# Patient Record
Sex: FEMALE | Race: WHITE | Employment: OTHER | URBAN - METROPOLITAN AREA
[De-identification: names, ages, dates, MRNs, and addresses within clinical notes are randomized per-mention and may not be internally consistent; named-entity substitution may affect disease eponyms.]

---

## 2017-03-24 ENCOUNTER — PATIENT MESSAGE (OUTPATIENT)
Dept: FAMILY MEDICINE CLINIC | Facility: CLINIC | Age: 62
End: 2017-03-24

## 2017-03-27 NOTE — TELEPHONE ENCOUNTER
From: Jessica Castanon RN  To: Wanda Prieto  Sent: 3/24/2017 3:27 PM CDT  Subject: Healthy Life Communication    3/24/2017       [address]  [address] [place] [address]      Dear Araceli Javed,    IF YOU ARE 48YEARS OF AGE OR OLDER, COLORECTAL CANCER SCR

## 2017-06-19 ENCOUNTER — OFFICE VISIT (OUTPATIENT)
Dept: FAMILY MEDICINE CLINIC | Facility: CLINIC | Age: 62
End: 2017-06-19

## 2017-06-19 ENCOUNTER — APPOINTMENT (OUTPATIENT)
Dept: LAB | Age: 62
End: 2017-06-19
Attending: FAMILY MEDICINE
Payer: COMMERCIAL

## 2017-06-19 VITALS
RESPIRATION RATE: 16 BRPM | OXYGEN SATURATION: 99 % | WEIGHT: 124 LBS | TEMPERATURE: 99 F | SYSTOLIC BLOOD PRESSURE: 112 MMHG | DIASTOLIC BLOOD PRESSURE: 80 MMHG | HEART RATE: 60 BPM | BODY MASS INDEX: 20.66 KG/M2 | HEIGHT: 65 IN

## 2017-06-19 DIAGNOSIS — Z13.89 SCREENING FOR GENITOURINARY CONDITION: ICD-10-CM

## 2017-06-19 DIAGNOSIS — Z11.59 NEED FOR HEPATITIS C SCREENING TEST: ICD-10-CM

## 2017-06-19 DIAGNOSIS — Z12.11 SCREENING FOR COLON CANCER: ICD-10-CM

## 2017-06-19 DIAGNOSIS — N90.4 LICHEN SCLEROSUS OF FEMALE GENITALIA: ICD-10-CM

## 2017-06-19 DIAGNOSIS — Z13.0 SCREENING FOR DEFICIENCY ANEMIA: ICD-10-CM

## 2017-06-19 DIAGNOSIS — Z13.29 SCREENING FOR THYROID DISORDER: ICD-10-CM

## 2017-06-19 DIAGNOSIS — Z13.1 SCREENING FOR DIABETES MELLITUS: ICD-10-CM

## 2017-06-19 DIAGNOSIS — Z00.00 WELL ADULT EXAM: ICD-10-CM

## 2017-06-19 DIAGNOSIS — Z12.31 ENCOUNTER FOR SCREENING MAMMOGRAM FOR BREAST CANCER: ICD-10-CM

## 2017-06-19 DIAGNOSIS — Z13.31 SCREENING FOR DEPRESSION: ICD-10-CM

## 2017-06-19 DIAGNOSIS — Z13.21 ENCOUNTER FOR VITAMIN DEFICIENCY SCREENING: ICD-10-CM

## 2017-06-19 DIAGNOSIS — Z23 NEED FOR VACCINATION: ICD-10-CM

## 2017-06-19 DIAGNOSIS — Z01.411 ENCOUNTER FOR GYNECOLOGICAL EXAMINATION WITH ABNORMAL FINDING: ICD-10-CM

## 2017-06-19 DIAGNOSIS — E78.00 PURE HYPERCHOLESTEROLEMIA: ICD-10-CM

## 2017-06-19 DIAGNOSIS — Z00.00 WELL ADULT EXAM: Primary | ICD-10-CM

## 2017-06-19 PROBLEM — M85.88 OSTEOPENIA OF SPINE: Status: ACTIVE | Noted: 2017-06-19

## 2017-06-19 PROCEDURE — 90471 IMMUNIZATION ADMIN: CPT | Performed by: FAMILY MEDICINE

## 2017-06-19 PROCEDURE — 81003 URINALYSIS AUTO W/O SCOPE: CPT | Performed by: FAMILY MEDICINE

## 2017-06-19 PROCEDURE — 82306 VITAMIN D 25 HYDROXY: CPT | Performed by: FAMILY MEDICINE

## 2017-06-19 PROCEDURE — 87510 GARDNER VAG DNA DIR PROBE: CPT | Performed by: FAMILY MEDICINE

## 2017-06-19 PROCEDURE — 86803 HEPATITIS C AB TEST: CPT | Performed by: FAMILY MEDICINE

## 2017-06-19 PROCEDURE — 36415 COLL VENOUS BLD VENIPUNCTURE: CPT | Performed by: FAMILY MEDICINE

## 2017-06-19 PROCEDURE — 80061 LIPID PANEL: CPT | Performed by: FAMILY MEDICINE

## 2017-06-19 PROCEDURE — 87660 TRICHOMONAS VAGIN DIR PROBE: CPT | Performed by: FAMILY MEDICINE

## 2017-06-19 PROCEDURE — 90736 HZV VACCINE LIVE SUBQ: CPT | Performed by: FAMILY MEDICINE

## 2017-06-19 PROCEDURE — 99396 PREV VISIT EST AGE 40-64: CPT | Performed by: FAMILY MEDICINE

## 2017-06-19 PROCEDURE — 80050 GENERAL HEALTH PANEL: CPT | Performed by: FAMILY MEDICINE

## 2017-06-19 PROCEDURE — 87480 CANDIDA DNA DIR PROBE: CPT | Performed by: FAMILY MEDICINE

## 2017-06-19 RX ORDER — CLOBETASOL PROPIONATE 0.5 MG/G
1 OINTMENT TOPICAL DAILY
Qty: 30 G | Refills: 0 | Status: SHIPPED | OUTPATIENT
Start: 2017-06-19 | End: 2017-07-17

## 2017-06-19 NOTE — PATIENT INSTRUCTIONS
Prevention Guidelines, Women Ages 48 to 59  Screening tests and vaccines are an important part of managing your health. Health counseling is essential, too. Below are guidelines for these, for women ages 48 to 59.  Talk with your healthcare provider to ma Lung cancer Adults age 54 to [de-identified] who have smoked Yearly screening in smokers with 30 pack-year history of smoking or who quit within 15 years   Obesity All women in this age group At routine exams   Osteoporosis Women who are postmenopausal Ask your healthc PPSV23: 1 to 2 doses through age 59, or 1 dose at 72 or older (protects against 23 types of pneumococcal bacteria)   Tetanus/diphtheria/pertussis (Td/Tdap) booster All women in this age group Td every 10 years, or a one-time dose of Tdap instead of a Td dana

## 2017-06-19 NOTE — PROGRESS NOTES
CC: Annual Physical Exam    HPI:   Dru Delgado is a 57year old female who presents for a complete physical exam. Symptoms: has itching, no discharge.     Wt Readings from Last 6 Encounters:  06/19/17 : 124 lb  10/30/16 : 125 lb  10/10/16 : 125 lb  06/16/16 wnl neg hpv 2014   • Broken leg 1999   • History of pneumonia as a child    • H1N1 influenza 7601   • Lichen sclerosus    • Diverticulosis    • Raynaud's disease    • Arthritis    • Frozen shoulder    • Anxiety           Past Surgical History    BIOPSY SYSTEMS:   CONSTITUTIONAL:  Denies unusual weight gain/loss, fever, chills, or fatigue. EENT:  Eyes:  Denies eye pain, visual loss, blurred vision, double vision or yellow sclerae.  Ears, Nose, Throat:  Denies hearing loss, congestion, runny nose or sore t erythema or exudate. Mouth:  No oral lesions or ulcerations, good dentition. NECK: Supple, no CLAD, no JVD, no thyromegaly. SKIN: No rashes, no skin lesion, no bruising, good turgor. HEART:  Regular rate and rhythm, no murmurs, no rubs or gallops.   Mesfin Shiraz MARLYS W/CAD (CPT=/45967); Future  - Occult Blood, Fecal, Immunoassay [E]; Future  - Immunization Admin Counseling, 1st Component, 18 years and older  - Zoster (Shingles) Immunization (48 and older)    2.  Screening for genitourinary condition  UA with Clobetasol Propionate 0.05 % External Ointment; Apply 1 Application topically daily. Dispense: 30 g; Refill: 0    Annual Depression Screen due on 10/23/1955 - done today. Mammogram,1 Yr due on 06/27/2017 - ordered today.     UA is abnormal.  Last eye exam

## 2017-06-26 ENCOUNTER — HOSPITAL ENCOUNTER (OUTPATIENT)
Dept: MAMMOGRAPHY | Age: 62
Discharge: HOME | End: 2017-06-26
Attending: FAMILY MEDICINE
Payer: COMMERCIAL

## 2017-06-26 DIAGNOSIS — Z12.31 ENCOUNTER FOR SCREENING MAMMOGRAM FOR BREAST CANCER: ICD-10-CM

## 2017-06-26 DIAGNOSIS — Z00.00 WELL ADULT EXAM: ICD-10-CM

## 2017-06-26 PROCEDURE — 77067 SCR MAMMO BI INCL CAD: CPT | Performed by: FAMILY MEDICINE

## 2017-06-28 ENCOUNTER — PATIENT MESSAGE (OUTPATIENT)
Dept: FAMILY MEDICINE CLINIC | Facility: CLINIC | Age: 62
End: 2017-06-28

## 2017-07-10 ENCOUNTER — APPOINTMENT (OUTPATIENT)
Dept: LAB | Facility: HOSPITAL | Age: 62
End: 2017-07-10
Attending: FAMILY MEDICINE
Payer: COMMERCIAL

## 2017-07-10 DIAGNOSIS — Z00.00 WELL ADULT EXAM: ICD-10-CM

## 2017-07-10 DIAGNOSIS — Z12.11 SCREENING FOR COLON CANCER: ICD-10-CM

## 2017-07-10 PROCEDURE — 82272 OCCULT BLD FECES 1-3 TESTS: CPT

## 2017-07-17 ENCOUNTER — OFFICE VISIT (OUTPATIENT)
Dept: OBGYN CLINIC | Facility: CLINIC | Age: 62
End: 2017-07-17

## 2017-07-17 VITALS
DIASTOLIC BLOOD PRESSURE: 64 MMHG | SYSTOLIC BLOOD PRESSURE: 122 MMHG | HEIGHT: 64.5 IN | WEIGHT: 126 LBS | BODY MASS INDEX: 21.25 KG/M2 | HEART RATE: 60 BPM

## 2017-07-17 DIAGNOSIS — N95.1 SYMPTOMATIC MENOPAUSAL OR FEMALE CLIMACTERIC STATES: ICD-10-CM

## 2017-07-17 DIAGNOSIS — N81.89 PELVIC FLOOR RELAXATION: ICD-10-CM

## 2017-07-17 DIAGNOSIS — N90.4 LICHEN SCLEROSUS OF FEMALE GENITALIA: ICD-10-CM

## 2017-07-17 DIAGNOSIS — Z01.419 WELL FEMALE EXAM WITH ROUTINE GYNECOLOGICAL EXAM: Primary | ICD-10-CM

## 2017-07-17 PROCEDURE — 99396 PREV VISIT EST AGE 40-64: CPT | Performed by: OBSTETRICS & GYNECOLOGY

## 2017-07-17 NOTE — PATIENT INSTRUCTIONS
Understanding Lichen Sclerosus  Lichen sclerosus is a long-term (chronic) skin condition. It causes white patches to form on the body. These can appear anywhere, even in the mouth. But they most often affect skin around the genitals.  The condition is mor · Surgery. This treatment helps with scarring and skin disfigurement. Men may benefit from circumcision if they haven’t yet had it done.   Possible complications of lichen sclerosus  ·  Skin cancer of the genitals  When to call your healthcare provider  Tuan

## 2017-07-17 NOTE — PROGRESS NOTES
GYN H&P     2017  3:28 PM    CC: Patient is here for annual     HPI: patient is a 57year old  here for her annual gyn exam.   She has complaints of lichen sclerosis and urinary urgency with pelvic floor issues.  She recently restarted clobetaso [facility]*        Comment: Many years ago, thinks was rash    Current Outpatient Prescriptions on File Prior to Visit:  Efinaconazole (JUBLIA) 10 % External Solution Apply 1 Application topically daily.  Disp: 8 mL Rfl: 5   Probiotic Product (PROBIOTIC O Sexual activity: No     Other Topics Concern    Caffeine Concern Yes    Comment: occas green tea, occas chocolate, occas soda     Exercise Yes    Comment: moni, treadmill, weights x 4days per week     Seat Belt Yes     Social History Narrative   None on f lesions noted. Bladder: well supported, urethra post menopausal, no lesions or fissures                     Vagina: normal pink mucosa, no lesions, normal clear discharge.                       Uterus: av, mobile, non tender, normal size mild desc

## 2017-09-26 ENCOUNTER — OFFICE VISIT (OUTPATIENT)
Dept: FAMILY MEDICINE CLINIC | Facility: CLINIC | Age: 62
End: 2017-09-26

## 2017-09-26 VITALS
OXYGEN SATURATION: 99 % | HEART RATE: 70 BPM | HEIGHT: 65 IN | TEMPERATURE: 98 F | WEIGHT: 124 LBS | DIASTOLIC BLOOD PRESSURE: 70 MMHG | SYSTOLIC BLOOD PRESSURE: 124 MMHG | BODY MASS INDEX: 20.66 KG/M2
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
## 2017-09-26 DIAGNOSIS — L08.89 SECONDARY INFECTION OF SKIN: ICD-10-CM

## 2017-09-26 PROCEDURE — 99212 OFFICE O/P EST SF 10 MIN: CPT | Performed by: PHYSICIAN ASSISTANT

## 2017-09-26 RX ORDER — CEPHALEXIN 500 MG/1
500 CAPSULE ORAL 2 TIMES DAILY
Qty: 20 CAPSULE | Refills: 0 | Status: SHIPPED | OUTPATIENT
Start: 2017-09-26 | End: 2017-10-06

## 2017-09-26 RX ORDER — METHYLPREDNISOLONE 4 MG/1
TABLET ORAL
Qty: 1 KIT | Refills: 0 | Status: SHIPPED | OUTPATIENT
Start: 2017-09-26 | End: 2018-07-25 | Stop reason: ALTCHOICE

## 2017-09-26 NOTE — PROGRESS NOTES
CHIEF COMPLAINT:   Patient presents with:  Bite Sting,Insect (integumentary): pt states she  has moquito bites on ankles, has redness and itching x 1 wk     HPI:     Marly Nickerson is a 57year old female who presents with concerns of insect bites on ankles.  P • Raynaud's disease    • Urinary incontinence    • Vestibulitis, vulvar 2010   • Vulvodynia 2010      Social History:  Smoking status: Never Smoker                                                              Smokeless tobacco: Never Used When an insect stings you, it injects venom. When an insect bites you, it does not. Stings and bites may cause a local reaction. Or they may cause a reaction that affects your whole body. Bites and stings may become infected.  Signs of infection include red Follow up with your healthcare provider, or as advised if you don't get better over the next 2 days or if your symptoms get worse.   Call 911  Call 911 if any of these occur:  · Swelling of the face, eyelids, mouth, throat, or tongue  · Difficulty swallowin

## 2017-11-04 ENCOUNTER — IMMUNIZATION (OUTPATIENT)
Dept: FAMILY MEDICINE CLINIC | Facility: CLINIC | Age: 62
End: 2017-11-04

## 2017-11-04 DIAGNOSIS — Z23 NEED FOR VACCINATION: ICD-10-CM

## 2017-11-04 PROCEDURE — 90686 IIV4 VACC NO PRSV 0.5 ML IM: CPT | Performed by: FAMILY MEDICINE

## 2017-11-04 PROCEDURE — 90471 IMMUNIZATION ADMIN: CPT | Performed by: FAMILY MEDICINE

## 2018-01-31 ENCOUNTER — TELEPHONE (OUTPATIENT)
Dept: FAMILY MEDICINE CLINIC | Facility: CLINIC | Age: 63
End: 2018-01-31

## 2018-01-31 NOTE — TELEPHONE ENCOUNTER
Received something from [facility] oncology, using  as PCP, she hasn't seen since 2013, David Bautista saw a couple of times, needs to book with  if she wishes, or  if she wants to stay with David Bautista.

## 2018-01-31 NOTE — TELEPHONE ENCOUNTER
Record shows appt scheduled 1/25/18 for cpx 6/21/18-last cpx was 6/19/17 with dr Jammie Bennett follow up visit in 3 months. No visit since. Due to above info, please call pt to schedule appt sooner-thanks!

## 2018-01-31 NOTE — TELEPHONE ENCOUNTER
All info below noted, but pt has dx hypercholesterolemia, was advised to follow up 9/2017, come in fasting and has not been seen or had repeat labs since. **dr Milton Loose to above info please advise if you want pt seen in ofc sooner than 6/2018-thanks!

## 2018-06-21 ENCOUNTER — OFFICE VISIT (OUTPATIENT)
Dept: FAMILY MEDICINE CLINIC | Facility: CLINIC | Age: 63
End: 2018-06-21

## 2018-06-21 VITALS
BODY MASS INDEX: 20.83 KG/M2 | HEIGHT: 65 IN | SYSTOLIC BLOOD PRESSURE: 100 MMHG | HEART RATE: 64 BPM | RESPIRATION RATE: 14 BRPM | DIASTOLIC BLOOD PRESSURE: 66 MMHG | WEIGHT: 125 LBS

## 2018-06-21 DIAGNOSIS — M85.88 OSTEOPENIA OF SPINE: ICD-10-CM

## 2018-06-21 DIAGNOSIS — Z13.89 SCREENING FOR GENITOURINARY CONDITION: ICD-10-CM

## 2018-06-21 DIAGNOSIS — Z13.29 SCREENING FOR ENDOCRINE, METABOLIC AND IMMUNITY DISORDER: ICD-10-CM

## 2018-06-21 DIAGNOSIS — Z78.0 POST-MENOPAUSAL: ICD-10-CM

## 2018-06-21 DIAGNOSIS — Z79.899 MEDICATION MANAGEMENT: ICD-10-CM

## 2018-06-21 DIAGNOSIS — Z13.228 SCREENING FOR ENDOCRINE, METABOLIC AND IMMUNITY DISORDER: ICD-10-CM

## 2018-06-21 DIAGNOSIS — B35.1 ONYCHOMYCOSIS: ICD-10-CM

## 2018-06-21 DIAGNOSIS — R82.90 ABNORMAL URINALYSIS: ICD-10-CM

## 2018-06-21 DIAGNOSIS — Z13.0 SCREENING FOR ENDOCRINE, METABOLIC AND IMMUNITY DISORDER: ICD-10-CM

## 2018-06-21 DIAGNOSIS — E55.9 VITAMIN D DEFICIENCY: ICD-10-CM

## 2018-06-21 DIAGNOSIS — Z00.00 BLOOD TESTS FOR ROUTINE GENERAL PHYSICAL EXAMINATION: ICD-10-CM

## 2018-06-21 DIAGNOSIS — Z00.00 ROUTINE GENERAL MEDICAL EXAMINATION AT A HEALTH CARE FACILITY: Primary | ICD-10-CM

## 2018-06-21 DIAGNOSIS — E78.00 PURE HYPERCHOLESTEROLEMIA: ICD-10-CM

## 2018-06-21 PROCEDURE — 81001 URINALYSIS AUTO W/SCOPE: CPT | Performed by: FAMILY MEDICINE

## 2018-06-21 PROCEDURE — 99396 PREV VISIT EST AGE 40-64: CPT | Performed by: FAMILY MEDICINE

## 2018-06-21 PROCEDURE — 81003 URINALYSIS AUTO W/O SCOPE: CPT | Performed by: FAMILY MEDICINE

## 2018-06-21 PROCEDURE — 87086 URINE CULTURE/COLONY COUNT: CPT | Performed by: FAMILY MEDICINE

## 2018-06-21 RX ORDER — CLOBETASOL PROPIONATE 0.5 MG/G
OINTMENT TOPICAL
COMMUNITY
Start: 2017-06-19 | End: 2018-07-25

## 2018-06-21 RX ORDER — TERBINAFINE HYDROCHLORIDE 250 MG/1
250 TABLET ORAL DAILY
Qty: 30 TABLET | Refills: 2 | Status: SHIPPED | OUTPATIENT
Start: 2018-06-21 | End: 2018-09-13

## 2018-06-21 RX ORDER — LYSINE HCL 500 MG
TABLET ORAL
COMMUNITY
End: 2018-07-25

## 2018-06-21 NOTE — H&P
CC: Annual Physical Exam    HPI:   Nain Sood is a 60year old female who presents for a complete physical exam. Symptoms: is menopausal. Patient complains of she has been dealing with onychomycosis of the right great toenail for the past 6 years.   She has mupirocin 2 % External Ointment Apply to affected area BID x10 days Disp: 15 g Rfl: 0   methylPREDNISolone (MEDROL) 4 MG Oral Tablet Therapy Pack As directed. Disp: 1 kit Rfl: 0   Probiotic Product (PROBIOTIC OR) Take 1 Tab by mouth daily.  Disp:  Rfl: Grandfather    • Hypertension Maternal Grandfather    • Stroke Maternal Grandfather    • Diabetes Paternal Grandmother    • Other [OTHER] Paternal Grandmother      dementia, glaucoma   • Diabetes Paternal Grandfather      No   • Heart Disorder Paternal Gra toenail  HEENT: atraumatic, normocephalic,ears and throat -- post nasal drip; mildly swollen turbs  EYES:PERRLA, EOMI, conjunctiva are clear  NECK: supple,no adenopathy,no bruits, no thyromegaly  CHEST: no chest tenderness  BREAST: DEFERRED to GYN  LUNGS: minutes and over 50% was spent counseling and coordination of care. Pt' s weight is Body mass index is 20.8 kg/m². , recommended low fat diet and aerobic exercise 30 minutes three times weekly.     The patient indicates understanding of these issues and a

## 2018-06-22 ENCOUNTER — TELEPHONE (OUTPATIENT)
Dept: FAMILY MEDICINE CLINIC | Facility: CLINIC | Age: 63
End: 2018-06-22

## 2018-06-22 ENCOUNTER — LAB ENCOUNTER (OUTPATIENT)
Dept: LAB | Age: 63
End: 2018-06-22
Attending: FAMILY MEDICINE
Payer: COMMERCIAL

## 2018-06-22 DIAGNOSIS — Z00.00 BLOOD TESTS FOR ROUTINE GENERAL PHYSICAL EXAMINATION: ICD-10-CM

## 2018-06-22 DIAGNOSIS — Z13.228 SCREENING FOR ENDOCRINE, METABOLIC AND IMMUNITY DISORDER: ICD-10-CM

## 2018-06-22 DIAGNOSIS — Z13.29 SCREENING FOR ENDOCRINE, METABOLIC AND IMMUNITY DISORDER: ICD-10-CM

## 2018-06-22 DIAGNOSIS — Z13.0 SCREENING FOR ENDOCRINE, METABOLIC AND IMMUNITY DISORDER: ICD-10-CM

## 2018-06-22 PROCEDURE — 36415 COLL VENOUS BLD VENIPUNCTURE: CPT | Performed by: FAMILY MEDICINE

## 2018-06-22 PROCEDURE — 86706 HEP B SURFACE ANTIBODY: CPT | Performed by: FAMILY MEDICINE

## 2018-06-22 PROCEDURE — 86735 MUMPS ANTIBODY: CPT | Performed by: FAMILY MEDICINE

## 2018-06-22 PROCEDURE — 86762 RUBELLA ANTIBODY: CPT | Performed by: FAMILY MEDICINE

## 2018-06-22 PROCEDURE — 86765 RUBEOLA ANTIBODY: CPT | Performed by: FAMILY MEDICINE

## 2018-06-22 PROCEDURE — 86708 HEPATITIS A ANTIBODY: CPT | Performed by: FAMILY MEDICINE

## 2018-06-22 PROCEDURE — 80061 LIPID PANEL: CPT | Performed by: FAMILY MEDICINE

## 2018-06-22 PROCEDURE — 80050 GENERAL HEALTH PANEL: CPT | Performed by: FAMILY MEDICINE

## 2018-06-22 PROCEDURE — 82306 VITAMIN D 25 HYDROXY: CPT | Performed by: FAMILY MEDICINE

## 2018-06-22 PROCEDURE — 86709 HEPATITIS A IGM ANTIBODY: CPT | Performed by: FAMILY MEDICINE

## 2018-06-25 ENCOUNTER — TELEPHONE (OUTPATIENT)
Dept: OBGYN CLINIC | Facility: CLINIC | Age: 63
End: 2018-06-25

## 2018-06-25 DIAGNOSIS — Z12.39 SCREENING FOR MALIGNANT NEOPLASM OF BREAST: Primary | ICD-10-CM

## 2018-06-25 PROBLEM — M54.10 RADICULITIS: Status: ACTIVE | Noted: 2018-06-25

## 2018-06-25 PROBLEM — M43.16 SPONDYLOLISTHESIS OF LUMBAR REGION: Status: ACTIVE | Noted: 2018-06-25

## 2018-06-25 NOTE — TELEPHONE ENCOUNTER
Patient is coming to see you for an Annual; however, she would like to know if you can put an order in for a mammogram before her appt.

## 2018-06-25 NOTE — TELEPHONE ENCOUNTER
Last ov: 7/17/17 with Dr. Rowland Plants for annual  Last mammogram: 6/26/17 benign    appt scheduled for 7/25/18    Screening mammogram order placed, per protocol. Patient notified by phone.

## 2018-06-27 ENCOUNTER — TELEPHONE (OUTPATIENT)
Dept: FAMILY MEDICINE CLINIC | Facility: CLINIC | Age: 63
End: 2018-06-27

## 2018-06-27 DIAGNOSIS — Z23 NEED FOR VACCINATION: Primary | ICD-10-CM

## 2018-06-28 ENCOUNTER — HOSPITAL ENCOUNTER (OUTPATIENT)
Dept: BONE DENSITY | Age: 63
Discharge: HOME | End: 2018-06-28
Attending: FAMILY MEDICINE
Payer: COMMERCIAL

## 2018-06-28 ENCOUNTER — HOSPITAL ENCOUNTER (OUTPATIENT)
Dept: MAMMOGRAPHY | Age: 63
Discharge: HOME | End: 2018-06-28
Attending: OBSTETRICS & GYNECOLOGY
Payer: COMMERCIAL

## 2018-06-28 DIAGNOSIS — Z12.39 SCREENING FOR MALIGNANT NEOPLASM OF BREAST: ICD-10-CM

## 2018-06-28 DIAGNOSIS — Z78.0 POST-MENOPAUSAL: ICD-10-CM

## 2018-06-28 DIAGNOSIS — E55.9 VITAMIN D DEFICIENCY: ICD-10-CM

## 2018-06-28 DIAGNOSIS — M85.88 OSTEOPENIA OF SPINE: ICD-10-CM

## 2018-06-28 PROCEDURE — 77067 SCR MAMMO BI INCL CAD: CPT | Performed by: OBSTETRICS & GYNECOLOGY

## 2018-06-28 PROCEDURE — 77063 BREAST TOMOSYNTHESIS BI: CPT | Performed by: OBSTETRICS & GYNECOLOGY

## 2018-06-28 PROCEDURE — 77080 DXA BONE DENSITY AXIAL: CPT | Performed by: FAMILY MEDICINE

## 2018-07-02 ENCOUNTER — NURSE ONLY (OUTPATIENT)
Dept: FAMILY MEDICINE CLINIC | Facility: CLINIC | Age: 63
End: 2018-07-02

## 2018-07-02 PROCEDURE — 90471 IMMUNIZATION ADMIN: CPT

## 2018-07-02 PROCEDURE — 90746 HEPB VACCINE 3 DOSE ADULT IM: CPT

## 2018-07-25 ENCOUNTER — OFFICE VISIT (OUTPATIENT)
Dept: OBGYN CLINIC | Facility: CLINIC | Age: 63
End: 2018-07-25
Payer: COMMERCIAL

## 2018-07-25 VITALS
SYSTOLIC BLOOD PRESSURE: 110 MMHG | DIASTOLIC BLOOD PRESSURE: 62 MMHG | HEIGHT: 65 IN | BODY MASS INDEX: 20.83 KG/M2 | HEART RATE: 65 BPM | WEIGHT: 125 LBS

## 2018-07-25 DIAGNOSIS — N81.89 PELVIC FLOOR RELAXATION: ICD-10-CM

## 2018-07-25 DIAGNOSIS — N90.4 LICHEN SCLEROSUS OF FEMALE GENITALIA: ICD-10-CM

## 2018-07-25 DIAGNOSIS — Z01.419 WELL FEMALE EXAM WITH ROUTINE GYNECOLOGICAL EXAM: Primary | ICD-10-CM

## 2018-07-25 DIAGNOSIS — Z12.4 CERVICAL CANCER SCREENING: ICD-10-CM

## 2018-07-25 PROCEDURE — 88175 CYTOPATH C/V AUTO FLUID REDO: CPT | Performed by: OBSTETRICS & GYNECOLOGY

## 2018-07-25 PROCEDURE — 87624 HPV HI-RISK TYP POOLED RSLT: CPT | Performed by: OBSTETRICS & GYNECOLOGY

## 2018-07-25 PROCEDURE — 99396 PREV VISIT EST AGE 40-64: CPT | Performed by: OBSTETRICS & GYNECOLOGY

## 2018-07-25 RX ORDER — BIOTIN 1 MG
TABLET ORAL
COMMUNITY
Start: 2018-06-24

## 2018-07-25 NOTE — PROGRESS NOTES
GYN H&P     2018  11:37 AM    CC: Patient is here for annual    HPI: patient is a 60year old  here for her annual gyn exam.   She has no complaints. Denies any pelvic pain or irregular vaginal discharge. LS is symptomatic off and on.  Advised cryosurgeries x 2  1990: OTHER SURGICAL HISTORY      Comment: stereotactic biopsy    Sulfa [facility]*        Comment: Many years ago, thinks was rash    Current Outpatient Prescriptions on File Prior to Visit:  ACIDOPHILUS LACTOBACILLUS OR Take by mouth Used    Alcohol use No    Drug use: No    Sexual activity: No     Other Topics Concern    Caffeine Concern Yes    Comment: occas green tea, occas chocolate, occas soda     Exercise Yes    Comment: moni, treadmill, weights x 4days per week     Seat [place] appear wnl, no abnormal discharge or lesions noted. Bladder:+ relaxation urethra wnl, no lesions or fissures                     Vagina:pale pink mucosa, no lesions, normal clear discharge.                       Uterus: av, mobile, non tender, nor

## 2018-07-26 LAB — HPV I/H RISK 1 DNA SPEC QL NAA+PROBE: NEGATIVE

## 2018-08-10 ENCOUNTER — NURSE ONLY (OUTPATIENT)
Dept: FAMILY MEDICINE CLINIC | Facility: CLINIC | Age: 63
End: 2018-08-10
Payer: COMMERCIAL

## 2018-08-10 ENCOUNTER — APPOINTMENT (OUTPATIENT)
Dept: LAB | Age: 63
End: 2018-08-10
Attending: FAMILY MEDICINE
Payer: COMMERCIAL

## 2018-08-10 DIAGNOSIS — Z23 NEED FOR VACCINATION: Primary | ICD-10-CM

## 2018-08-10 DIAGNOSIS — B35.1 ONYCHOMYCOSIS: ICD-10-CM

## 2018-08-10 LAB
ALBUMIN SERPL-MCNC: 3.7 G/DL (ref 3.5–4.8)
ALP LIVER SERPL-CCNC: 68 U/L (ref 50–130)
ALT SERPL-CCNC: 21 U/L (ref 14–54)
AST SERPL-CCNC: 30 U/L (ref 15–41)
BILIRUB DIRECT SERPL-MCNC: 0.1 MG/DL (ref 0.1–0.5)
BILIRUB SERPL-MCNC: 0.3 MG/DL (ref 0.1–2)
M PROTEIN MFR SERPL ELPH: 7.2 G/DL (ref 6.1–8.3)

## 2018-08-10 PROCEDURE — 36415 COLL VENOUS BLD VENIPUNCTURE: CPT | Performed by: FAMILY MEDICINE

## 2018-08-10 PROCEDURE — 90471 IMMUNIZATION ADMIN: CPT | Performed by: FAMILY MEDICINE

## 2018-08-10 PROCEDURE — 80076 HEPATIC FUNCTION PANEL: CPT | Performed by: FAMILY MEDICINE

## 2018-08-10 PROCEDURE — 90746 HEPB VACCINE 3 DOSE ADULT IM: CPT | Performed by: FAMILY MEDICINE

## 2018-08-27 ENCOUNTER — OFFICE VISIT (OUTPATIENT)
Dept: FAMILY MEDICINE CLINIC | Facility: CLINIC | Age: 63
End: 2018-08-27
Payer: COMMERCIAL

## 2018-08-27 VITALS
OXYGEN SATURATION: 99 % | DIASTOLIC BLOOD PRESSURE: 60 MMHG | HEART RATE: 55 BPM | WEIGHT: 123 LBS | SYSTOLIC BLOOD PRESSURE: 100 MMHG | RESPIRATION RATE: 16 BRPM | BODY MASS INDEX: 20.49 KG/M2 | HEIGHT: 65 IN

## 2018-08-27 DIAGNOSIS — L57.0 AK (ACTINIC KERATOSIS): Primary | ICD-10-CM

## 2018-08-27 DIAGNOSIS — L82.1 SK (SEBORRHEIC KERATOSIS): ICD-10-CM

## 2018-08-27 DIAGNOSIS — B35.1 ONYCHOMYCOSIS: ICD-10-CM

## 2018-08-27 PROCEDURE — 99213 OFFICE O/P EST LOW 20 MIN: CPT | Performed by: FAMILY MEDICINE

## 2018-08-27 NOTE — PROGRESS NOTES
Anna Galloway is a 60year old female. HPI:   PT. Is here for K and SK removal.  She would like me to look into her right ear. Wax? She wants me to look at her right great toenail. Meds reviewed.       Current Outpatient Prescriptions:  Cholecalciferol (VIT feels well otherwise  SKIN:  skin lesions  EAR: wax?   LUNGS: denies shortness of breath with exertion  CARDIOVASCULAR: denies chest pain on exertion  GI: denies abdominal pain,denies heartburn  MUSCULOSKELETAL: denies back pain  EXTREMITIES:  No pain or nu

## 2018-09-03 ENCOUNTER — OFFICE VISIT (OUTPATIENT)
Dept: FAMILY MEDICINE CLINIC | Facility: CLINIC | Age: 63
End: 2018-09-03
Payer: COMMERCIAL

## 2018-09-03 VITALS
SYSTOLIC BLOOD PRESSURE: 114 MMHG | TEMPERATURE: 98 F | HEIGHT: 65 IN | WEIGHT: 127 LBS | HEART RATE: 61 BPM | DIASTOLIC BLOOD PRESSURE: 66 MMHG | BODY MASS INDEX: 21.16 KG/M2 | OXYGEN SATURATION: 98 %

## 2018-09-03 DIAGNOSIS — H61.21 IMPACTED CERUMEN OF RIGHT EAR: Primary | ICD-10-CM

## 2018-09-03 PROCEDURE — 69209 REMOVE IMPACTED EAR WAX UNI: CPT | Performed by: PHYSICIAN ASSISTANT

## 2018-09-03 NOTE — PROGRESS NOTES
CHIEF COMPLAINT:   Patient presents with:  Ear Problem: pt states she has trouble hearing from right ear       HPI:   Carlo Snell is a 60year old female who presents to clinic today with complaints of clogged ears. Patient requesting ear flush.  Patient developed, well nourished,in no apparent distress    Cerumen Removal Procedure  Patient gave verbal consent. Risks and benefits of removal were discussed with the patient. Patient agreed to proceed with procedure.     Location: right ear   Indication: TM

## 2018-09-24 ENCOUNTER — OFFICE VISIT (OUTPATIENT)
Dept: FAMILY MEDICINE CLINIC | Facility: CLINIC | Age: 63
End: 2018-09-24
Payer: COMMERCIAL

## 2018-09-24 ENCOUNTER — APPOINTMENT (OUTPATIENT)
Dept: LAB | Age: 63
End: 2018-09-24
Attending: FAMILY MEDICINE
Payer: COMMERCIAL

## 2018-09-24 VITALS
TEMPERATURE: 98 F | BODY MASS INDEX: 20.24 KG/M2 | HEIGHT: 64.5 IN | RESPIRATION RATE: 14 BRPM | DIASTOLIC BLOOD PRESSURE: 70 MMHG | WEIGHT: 120 LBS | SYSTOLIC BLOOD PRESSURE: 100 MMHG | HEART RATE: 64 BPM

## 2018-09-24 DIAGNOSIS — R19.7 DIARRHEA, UNSPECIFIED TYPE: Primary | ICD-10-CM

## 2018-09-24 DIAGNOSIS — Z23 NEED FOR VACCINATION: ICD-10-CM

## 2018-09-24 DIAGNOSIS — B35.1 ONYCHOMYCOSIS: ICD-10-CM

## 2018-09-24 LAB
ALBUMIN SERPL-MCNC: 3.9 G/DL (ref 3.5–4.8)
ALP LIVER SERPL-CCNC: 63 U/L (ref 50–130)
ALT SERPL-CCNC: 20 U/L (ref 14–54)
AST SERPL-CCNC: 30 U/L (ref 15–41)
BILIRUB DIRECT SERPL-MCNC: 0.1 MG/DL (ref 0.1–0.5)
BILIRUB SERPL-MCNC: 0.4 MG/DL (ref 0.1–2)
M PROTEIN MFR SERPL ELPH: 7.8 G/DL (ref 6.1–8.3)

## 2018-09-24 PROCEDURE — 99213 OFFICE O/P EST LOW 20 MIN: CPT | Performed by: FAMILY MEDICINE

## 2018-09-24 PROCEDURE — 90471 IMMUNIZATION ADMIN: CPT | Performed by: FAMILY MEDICINE

## 2018-09-24 PROCEDURE — 90686 IIV4 VACC NO PRSV 0.5 ML IM: CPT | Performed by: FAMILY MEDICINE

## 2018-09-24 PROCEDURE — 36415 COLL VENOUS BLD VENIPUNCTURE: CPT | Performed by: FAMILY MEDICINE

## 2018-09-24 PROCEDURE — 80076 HEPATIC FUNCTION PANEL: CPT | Performed by: FAMILY MEDICINE

## 2018-09-24 NOTE — PROGRESS NOTES
Marlen Blanca is a 60year old female. HPI:   Left to a trip on September 10 to [facility]. Had 6 BM yesterday. It was watery BM today. She had a good stool on Friday, but had immodium from [address] [place] [address] Street. Has lost a few pounds. Appetite is good.   No fever or c Bilirubin, Direct 0.1 0.1 - 0.5 mg/dL    Total Protein 7.2 6.1 - 8.3 g/dL    Albumin 3.7 3.5 - 4.8 g/dL       REVIEW OF SYSTEMS:   GENERAL: feels well otherwise  SKIN: denies any unusual skin lesions  LUNGS: denies shortness of breath with exertion  CARDIO

## 2018-09-25 ENCOUNTER — LAB ENCOUNTER (OUTPATIENT)
Dept: LAB | Age: 63
End: 2018-09-25
Attending: FAMILY MEDICINE
Payer: COMMERCIAL

## 2018-09-25 DIAGNOSIS — R19.7 DIARRHEA, UNSPECIFIED TYPE: ICD-10-CM

## 2018-09-25 PROCEDURE — 87177 OVA AND PARASITES SMEARS: CPT | Performed by: FAMILY MEDICINE

## 2018-09-25 PROCEDURE — 87046 STOOL CULTR AEROBIC BACT EA: CPT | Performed by: FAMILY MEDICINE

## 2018-09-25 PROCEDURE — 87045 FECES CULTURE AEROBIC BACT: CPT | Performed by: FAMILY MEDICINE

## 2018-09-25 PROCEDURE — 87427 SHIGA-LIKE TOXIN AG IA: CPT | Performed by: FAMILY MEDICINE

## 2018-09-25 PROCEDURE — 87493 C DIFF AMPLIFIED PROBE: CPT | Performed by: FAMILY MEDICINE

## 2018-09-25 PROCEDURE — 87209 SMEAR COMPLEX STAIN: CPT | Performed by: FAMILY MEDICINE

## 2018-09-25 PROCEDURE — 87272 CRYPTOSPORIDIUM AG IF: CPT | Performed by: FAMILY MEDICINE

## 2018-09-25 PROCEDURE — 87329 GIARDIA AG IA: CPT | Performed by: FAMILY MEDICINE

## 2018-09-26 LAB
CRYPTOSP AG STL QL IA: NEGATIVE
G LAMBLIA AG STL QL IA: NEGATIVE

## 2018-09-29 LAB
OVA AND PARASITE, TRICHROME STAIN: NEGATIVE
OVA AND PARASITE, WET MOUNT: NEGATIVE

## 2019-01-09 ENCOUNTER — NURSE ONLY (OUTPATIENT)
Dept: FAMILY MEDICINE CLINIC | Facility: CLINIC | Age: 64
End: 2019-01-09
Payer: COMMERCIAL

## 2019-01-09 PROCEDURE — 90471 IMMUNIZATION ADMIN: CPT | Performed by: FAMILY MEDICINE

## 2019-01-09 PROCEDURE — 90746 HEPB VACCINE 3 DOSE ADULT IM: CPT | Performed by: FAMILY MEDICINE

## 2019-06-25 ENCOUNTER — OFFICE VISIT (OUTPATIENT)
Dept: FAMILY MEDICINE CLINIC | Facility: CLINIC | Age: 64
End: 2019-06-25
Payer: COMMERCIAL

## 2019-06-25 ENCOUNTER — TELEPHONE (OUTPATIENT)
Dept: OBGYN CLINIC | Facility: CLINIC | Age: 64
End: 2019-06-25

## 2019-06-25 VITALS
HEIGHT: 65 IN | BODY MASS INDEX: 20.49 KG/M2 | DIASTOLIC BLOOD PRESSURE: 68 MMHG | HEART RATE: 66 BPM | SYSTOLIC BLOOD PRESSURE: 120 MMHG | WEIGHT: 123 LBS | RESPIRATION RATE: 14 BRPM

## 2019-06-25 DIAGNOSIS — Z00.00 ROUTINE GENERAL MEDICAL EXAMINATION AT A HEALTH CARE FACILITY: Primary | ICD-10-CM

## 2019-06-25 DIAGNOSIS — Z12.31 OTHER SCREENING MAMMOGRAM: Primary | ICD-10-CM

## 2019-06-25 DIAGNOSIS — Z00.00 LABORATORY EXAMINATION ORDERED AS PART OF A ROUTINE GENERAL MEDICAL EXAMINATION: ICD-10-CM

## 2019-06-25 DIAGNOSIS — Z13.89 SCREENING FOR GENITOURINARY CONDITION: ICD-10-CM

## 2019-06-25 PROCEDURE — 81003 URINALYSIS AUTO W/O SCOPE: CPT | Performed by: FAMILY MEDICINE

## 2019-06-25 PROCEDURE — 99396 PREV VISIT EST AGE 40-64: CPT | Performed by: FAMILY MEDICINE

## 2019-06-25 NOTE — H&P
CC: Annual Physical Exam    HPI:   Wanda Prieto is a 58year old female who presents for a complete physical exam. Symptoms: is menopausal. Patient complains of nothing.      Wt Readings from Last 6 Encounters:  06/25/19 : 123 lb  09/24/18 : 120 lb  09/03/18 hpv 2014   • Raynaud's disease    • Urinary incontinence    • Vestibulitis, vulvar 2010   • Vulvodynia 2010      Past Surgical History:   Procedure Laterality Date   • BIOPSY OF BREAST, NEEDLE CORE  01/01/2002   • COLONOSCOPY  2003 & 2013   • COLPOSCOPY, C Exercise: 4 times per week.   Diet: watches fats closely, watches sugar closely and watches calories closely     REVIEW OF SYSTEMS:   GENERAL: feels well otherwise  SKIN: denies any unusual skin lesions  EYES:denies blurred vision or double vision  HEENT: check: Orders Placed This Encounter      CBC W/DIFF      COMP METABOLIC PANEL      TSH W REFLEX TO FREE T4      LIPID PANEL      VITAMIN D, 25-HYDROXY      Urinalysis, Routine [E]      Zoster Recombinant Adjuvanted [Shingrix -Shingles] (55630)    UA is don

## 2019-06-25 NOTE — TELEPHONE ENCOUNTER
Last OV: 7/25/18 with Dr. Bereket Hughes for annual  Last mammogram: 6/28/18 screening mammogram, birads 2- benign  Next appt: 8/21/19    screening mammogram ordered per protocol.

## 2019-06-26 ENCOUNTER — LAB ENCOUNTER (OUTPATIENT)
Dept: LAB | Age: 64
End: 2019-06-26
Attending: FAMILY MEDICINE
Payer: COMMERCIAL

## 2019-06-26 DIAGNOSIS — Z00.00 LABORATORY EXAMINATION ORDERED AS PART OF A ROUTINE GENERAL MEDICAL EXAMINATION: ICD-10-CM

## 2019-06-26 DIAGNOSIS — D75.89 MACROCYTOSIS: ICD-10-CM

## 2019-06-26 DIAGNOSIS — Z13.89 SCREENING FOR GENITOURINARY CONDITION: ICD-10-CM

## 2019-06-26 DIAGNOSIS — D75.89 MACROCYTOSIS: Primary | ICD-10-CM

## 2019-06-26 PROCEDURE — 82306 VITAMIN D 25 HYDROXY: CPT | Performed by: FAMILY MEDICINE

## 2019-06-26 PROCEDURE — 80050 GENERAL HEALTH PANEL: CPT | Performed by: FAMILY MEDICINE

## 2019-06-26 PROCEDURE — 82746 ASSAY OF FOLIC ACID SERUM: CPT | Performed by: FAMILY MEDICINE

## 2019-06-26 PROCEDURE — 82607 VITAMIN B-12: CPT | Performed by: FAMILY MEDICINE

## 2019-06-26 PROCEDURE — 36415 COLL VENOUS BLD VENIPUNCTURE: CPT | Performed by: FAMILY MEDICINE

## 2019-06-26 PROCEDURE — 80061 LIPID PANEL: CPT | Performed by: FAMILY MEDICINE

## 2019-07-01 ENCOUNTER — HOSPITAL ENCOUNTER (OUTPATIENT)
Dept: MAMMOGRAPHY | Age: 64
Discharge: HOME | End: 2019-07-01
Attending: OBSTETRICS & GYNECOLOGY
Payer: COMMERCIAL

## 2019-07-01 DIAGNOSIS — Z12.31 OTHER SCREENING MAMMOGRAM: ICD-10-CM

## 2019-07-01 PROCEDURE — 77063 BREAST TOMOSYNTHESIS BI: CPT | Performed by: OBSTETRICS & GYNECOLOGY

## 2019-07-01 PROCEDURE — 77067 SCR MAMMO BI INCL CAD: CPT | Performed by: OBSTETRICS & GYNECOLOGY

## 2019-08-21 ENCOUNTER — OFFICE VISIT (OUTPATIENT)
Dept: OBGYN CLINIC | Facility: CLINIC | Age: 64
End: 2019-08-21
Payer: COMMERCIAL

## 2019-08-21 VITALS
BODY MASS INDEX: 22.32 KG/M2 | SYSTOLIC BLOOD PRESSURE: 112 MMHG | DIASTOLIC BLOOD PRESSURE: 62 MMHG | WEIGHT: 126 LBS | HEIGHT: 63 IN

## 2019-08-21 DIAGNOSIS — Z01.419 WELL FEMALE EXAM WITH ROUTINE GYNECOLOGICAL EXAM: Primary | ICD-10-CM

## 2019-08-21 DIAGNOSIS — N81.89 PELVIC FLOOR RELAXATION: ICD-10-CM

## 2019-08-21 DIAGNOSIS — N90.4 LICHEN SCLEROSUS OF FEMALE GENITALIA: ICD-10-CM

## 2019-08-21 PROCEDURE — 99396 PREV VISIT EST AGE 40-64: CPT | Performed by: OBSTETRICS & GYNECOLOGY

## 2019-08-21 RX ORDER — CLOBETASOL PROPIONATE 0.5 MG/G
1 CREAM TOPICAL 2 TIMES DAILY
Qty: 30 G | Refills: 1 | Status: SHIPPED | OUTPATIENT
Start: 2019-08-21 | End: 2021-06-14

## 2019-08-21 NOTE — PROGRESS NOTES
GYN H&P     2019  9:33 AM    CC: Patient is here for annual    HPI: patient is a 58year old  here for her annual gyn exam.   She hascomplaints. . Denies any pelvic pain or irregular vaginal discharge.  Needs clobetasol refill  Contraception: na Visit:  Cholecalciferol (VITAMIN D3) 1000 units Oral Cap  Disp:  Rfl:    Probiotic Product (PROBIOTIC OR) Take 1 Tab by mouth daily. Disp:  Rfl:    Omega-3 Fatty Acids (FISH OIL) 1000 MG Oral Cap Take 1 Cap by mouth daily.  Disp:  Rfl:      No current facil Smoker      Smokeless tobacco: Never Used    Substance and Sexual Activity      Alcohol use: No        Alcohol/week: 0.0 standard drinks      Drug use: No      Sexual activity: Never        Partners: Male    Lifestyle      Physical activity:        Days pe dyspareunia   Hematological/lymphatic: denies history of excessive bleeding or bruising, denies dizziness, lightheadedness.    Breast: denies rashes, skin changes, pain, lumps or discharge   Psychiatric: denies depression, changes in sleep patterns, anxiety Propionate 0.05 % External Cream; Apply 1 Application topically 2 (two) times daily. Dispense: 30 g; Refill: 1    3.  Pelvic floor relaxation  asymptomatic      Jerzy Layne MD

## 2019-10-06 ENCOUNTER — IMMUNIZATION (OUTPATIENT)
Dept: FAMILY MEDICINE CLINIC | Facility: CLINIC | Age: 64
End: 2019-10-06
Payer: MEDICARE

## 2019-10-06 DIAGNOSIS — Z23 NEED FOR VACCINATION: ICD-10-CM

## 2019-10-06 PROCEDURE — 90686 IIV4 VACC NO PRSV 0.5 ML IM: CPT | Performed by: FAMILY MEDICINE

## 2019-10-06 PROCEDURE — G0008 ADMIN INFLUENZA VIRUS VAC: HCPCS | Performed by: FAMILY MEDICINE

## 2020-01-13 ENCOUNTER — TELEPHONE (OUTPATIENT)
Dept: FAMILY MEDICINE CLINIC | Facility: CLINIC | Age: 65
End: 2020-01-13

## 2020-01-13 NOTE — TELEPHONE ENCOUNTER
May 25, 2018      Lonny Bone MD  [address]  [facility] [address]           [facility]  [address]  [facility] [address]  Phone: [phone]  Fax: [phone]          Patient: Juno Warner   MR Number: [id number]   YOB: 1973   Date of Visit: 5/25/2018       Dear Dr. Lonny Bone:    Thank you for referring Juno Warner to me for evaluation. Attached you will find relevant portions of my assessment and plan of care.    If you have questions, please do not hesitate to call me. I look forward to following Juno Warner along with you.    Sincerely,    Lelia López MD    Enclosure  CC:  No Recipients    If you would like to receive this communication electronically, please contact [email or web address] or [phone] to request more information on [employer] Link access.    For providers and/or their staff who would like to refer a patient to [facility], please contact us through our one-stop-shop provider referral line, [facility], at [phone].    If you feel you have received this communication in error or would no longer like to receive these types of communications, please e-mail [email or web address]          See below. Pneumovax with nurse vs?  Saw you in June for px. To return in a year. Please advise thanks.

## 2020-01-13 NOTE — TELEPHONE ENCOUNTER
Pt saw on mychart she is overdue for pneumonia shot and would like to know if she needs one. Please advise. Thank you.

## 2020-01-14 ENCOUNTER — NURSE ONLY (OUTPATIENT)
Dept: FAMILY MEDICINE CLINIC | Facility: CLINIC | Age: 65
End: 2020-01-14
Payer: MEDICARE

## 2020-01-14 PROCEDURE — 90732 PPSV23 VACC 2 YRS+ SUBQ/IM: CPT | Performed by: FAMILY MEDICINE

## 2020-01-14 PROCEDURE — G0009 ADMIN PNEUMOCOCCAL VACCINE: HCPCS | Performed by: FAMILY MEDICINE

## 2020-01-14 NOTE — PROGRESS NOTES
Pt was seen today for a pneumovax 23 vaccine. ABN signed by pt. Copy of VIS and copy of immunizations given to pt. Injection given, pt handled well.

## 2020-03-04 ENCOUNTER — NURSE ONLY (OUTPATIENT)
Dept: FAMILY MEDICINE CLINIC | Facility: CLINIC | Age: 65
End: 2020-03-04
Payer: COMMERCIAL

## 2020-03-04 PROCEDURE — 90471 IMMUNIZATION ADMIN: CPT | Performed by: FAMILY MEDICINE

## 2020-03-04 PROCEDURE — 90750 HZV VACC RECOMBINANT IM: CPT | Performed by: FAMILY MEDICINE

## 2020-03-04 NOTE — PROGRESS NOTES
Pt presents for shingrix vaccine #1  Pt sts \"received old shingles vaccine but dr Omaira Renee said it's been long enough ago that I can do the shingrix. \"  Record shows zostavax x1 given 6/19/17  Pt sts tried to receive shingrix at pharmacy couple yrs ago

## 2020-05-26 ENCOUNTER — NURSE ONLY (OUTPATIENT)
Dept: FAMILY MEDICINE CLINIC | Facility: CLINIC | Age: 65
End: 2020-05-26
Payer: COMMERCIAL

## 2020-05-26 PROBLEM — M19.041 OSTEOARTHRITIS, HAND, PRIMARY LOCALIZED, RIGHT: Status: ACTIVE | Noted: 2019-09-16

## 2020-05-26 PROCEDURE — 90750 HZV VACC RECOMBINANT IM: CPT | Performed by: FAMILY MEDICINE

## 2020-05-26 PROCEDURE — 90471 IMMUNIZATION ADMIN: CPT | Performed by: FAMILY MEDICINE

## 2020-05-26 NOTE — PROGRESS NOTES
Pt was seen today for her 2nd and final Shingrex vaccine. VIS offered to pt. ABN signed for vaccine. Injection given, pt handled well.

## 2020-06-18 ENCOUNTER — TELEPHONE (OUTPATIENT)
Dept: OBGYN CLINIC | Facility: CLINIC | Age: 65
End: 2020-06-18

## 2020-06-18 DIAGNOSIS — Z13.820 SCREENING FOR OSTEOPOROSIS: Primary | ICD-10-CM

## 2020-06-18 NOTE — TELEPHONE ENCOUNTER
Last OV: 8/21/19 with Dr. Ros Daniel for annual  Last mammogram: 6/25/19, screening mammo, birads 2- benign  Next appt: 9/2/20    screening mammogram ordered per protocol. Last Dexa was done on 6/21/18. Osteopenia.  Recommendation was for repeat study in 2
Ok to order
PT requesting mammogram and dexascan  Please call PT when order has been placed    Future Appointments   Date Time Provider Isabella Henry   6/30/2020 12:00 PM Kathryn Logan DO [facility] 11 [facility]   9/2/2020 10:00 AM Florian Abernathy MD [facility] N [facility]
Patient informed. Verbalized understanding. No further questions or concerns.
No significant past surgical history

## 2020-06-30 ENCOUNTER — OFFICE VISIT (OUTPATIENT)
Dept: FAMILY MEDICINE CLINIC | Facility: CLINIC | Age: 65
End: 2020-06-30
Payer: COMMERCIAL

## 2020-06-30 VITALS
TEMPERATURE: 98 F | RESPIRATION RATE: 14 BRPM | DIASTOLIC BLOOD PRESSURE: 70 MMHG | WEIGHT: 124 LBS | HEIGHT: 64.5 IN | HEART RATE: 64 BPM | SYSTOLIC BLOOD PRESSURE: 120 MMHG | BODY MASS INDEX: 20.91 KG/M2

## 2020-06-30 DIAGNOSIS — E78.00 PURE HYPERCHOLESTEROLEMIA: ICD-10-CM

## 2020-06-30 DIAGNOSIS — Z79.899 MEDICATION MANAGEMENT: ICD-10-CM

## 2020-06-30 DIAGNOSIS — E01.0 THYROMEGALY: ICD-10-CM

## 2020-06-30 DIAGNOSIS — Z00.00 ENCOUNTER FOR MEDICARE ANNUAL WELLNESS EXAM: ICD-10-CM

## 2020-06-30 DIAGNOSIS — Z13.228 SCREENING FOR ENDOCRINE, METABOLIC AND IMMUNITY DISORDER: ICD-10-CM

## 2020-06-30 DIAGNOSIS — Z13.29 SCREENING FOR ENDOCRINE, METABOLIC AND IMMUNITY DISORDER: ICD-10-CM

## 2020-06-30 DIAGNOSIS — B35.1 ONYCHOMYCOSIS: ICD-10-CM

## 2020-06-30 DIAGNOSIS — Z00.00 ENCOUNTER FOR ANNUAL HEALTH EXAMINATION: Primary | ICD-10-CM

## 2020-06-30 DIAGNOSIS — Z13.0 SCREENING FOR ENDOCRINE, METABOLIC AND IMMUNITY DISORDER: ICD-10-CM

## 2020-06-30 DIAGNOSIS — Z13.0 SCREENING, ANEMIA, DEFICIENCY, IRON: ICD-10-CM

## 2020-06-30 DIAGNOSIS — Z91.09 ENVIRONMENTAL ALLERGIES: ICD-10-CM

## 2020-06-30 DIAGNOSIS — E55.9 VITAMIN D DEFICIENCY: ICD-10-CM

## 2020-06-30 DIAGNOSIS — L90.0 LICHEN SCLEROSUS: ICD-10-CM

## 2020-06-30 PROCEDURE — 96160 PT-FOCUSED HLTH RISK ASSMT: CPT | Performed by: FAMILY MEDICINE

## 2020-06-30 PROCEDURE — 99397 PER PM REEVAL EST PAT 65+ YR: CPT | Performed by: FAMILY MEDICINE

## 2020-06-30 PROCEDURE — G0402 INITIAL PREVENTIVE EXAM: HCPCS | Performed by: FAMILY MEDICINE

## 2020-06-30 NOTE — PROGRESS NOTES
HPI:   Bernadette Mathews is a 71year old female who presents for a MA (Medicare Advantage) Supervisit (Once per calendar year). Pt. Is here for Ma supervisit. Has appt with Dr. Sana Brumfield -- Sept, 2020.     Annual Physical due on 08/21/2020        Fall/Risk A colon     Osteopenia of spine     Spondylolisthesis of lumbar region     Radiculitis     Chondromalacia of patella     Adhesive capsulitis of right shoulder     Osteoarthritis, hand, primary localized, right    Wt Readings from Last 3 Encounters:  06/30/20 She  has a past surgical history that includes biopsy of breast, needle core (01/01/2002); madelaine biopsy stereo nodule 2 site bilat (cpt=19081/32656); colonoscopy (2003 & 2013); cryocautery of cervix (1982 & 1987); colposcopy, cervix w/upper adjacent vagin Pulse 64   Temp 97.9 °F (36.6 °C) (Oral)   Resp 14   Ht 64.5\"   Wt 124 lb (56.2 kg)   BMI 20.96 kg/m²  Estimated body mass index is 20.96 kg/m² as calculated from the following:    Height as of this encounter: 64.5\".     Weight as of this encounter: 124 l Administered   • >=3 YRS FLUZONE OR FLUARIX QUAD PRESERVE FREE SINGLE DOSE (56878) FLU CLINIC 09/24/2016   • FLULAVAL 6 months & older 0.5 ml Prefilled syringe (65447) 11/04/2017, 09/24/2018, 10/06/2019   • HEP B, Adult 07/02/2018, 08/10/2018, 01/09/2019 and immunity disorder -- CMP ordered  -     COMP METABOLIC PANEL (14); Future    Encounter for Medicare annual wellness exam  -- done today    Medication management  -- stable; CPM  -     COMP METABOLIC PANEL (14);  Future    Thyromegaly  -- /US thyroid  - Result (no units)   Date Value   07/10/2017 Negative for Occult Blood   07/10/2017 Negative for Occult Blood   07/10/2017 Negative for Occult Blood    No flowsheet data found.     Glaucoma Screening      Ophthalmology Visit Annually: Diabetics, FHx Glaucoma [address] [[id number]]

## 2020-06-30 NOTE — PATIENT INSTRUCTIONS
Tay Robledo's SCREENING SCHEDULE   Tests on this list are recommended by your physician but may not be covered, or covered at this frequency, by your insurer. Please check with your insurance carrier before scheduling to verify coverage.    PREVENTATIVE SE Men who are 73-68 years old and have smoked more than 100 cigarettes in their lifetime   • Anyone with a family history    Colorectal Cancer Screening  Covered up to Age 76     Colonoscopy Screen   Covered every 10 years- more often if abnormal Colonoscopy Mammogram regularly   Immunizations      Influenza  Covered Annually Orders placed or performed in visit on 09/24/16   • FLU VAC NO PRSV 4 MARCO 3 YRS+    Please get every year    Pneumococcal 13 (Prevnar)  Covered Once after 65 No orders found for this or a different types of Advance Directives. It also has the State forms available on it's website for anyone to review and print using their home computer and printer. (the forms are also available in [address])  [email or web address]  This link also has informa

## 2020-07-06 ENCOUNTER — LAB ENCOUNTER (OUTPATIENT)
Dept: LAB | Age: 65
End: 2020-07-06
Attending: FAMILY MEDICINE
Payer: MEDICARE

## 2020-07-06 DIAGNOSIS — E55.9 VITAMIN D DEFICIENCY: ICD-10-CM

## 2020-07-06 DIAGNOSIS — Z79.899 MEDICATION MANAGEMENT: ICD-10-CM

## 2020-07-06 DIAGNOSIS — E78.00 PURE HYPERCHOLESTEROLEMIA: ICD-10-CM

## 2020-07-06 DIAGNOSIS — Z13.228 SCREENING FOR ENDOCRINE, METABOLIC AND IMMUNITY DISORDER: ICD-10-CM

## 2020-07-06 DIAGNOSIS — R71.8 ELEVATED MCV: ICD-10-CM

## 2020-07-06 DIAGNOSIS — Z13.0 SCREENING FOR ENDOCRINE, METABOLIC AND IMMUNITY DISORDER: ICD-10-CM

## 2020-07-06 DIAGNOSIS — Z13.29 SCREENING FOR ENDOCRINE, METABOLIC AND IMMUNITY DISORDER: ICD-10-CM

## 2020-07-06 DIAGNOSIS — Z13.0 SCREENING, ANEMIA, DEFICIENCY, IRON: ICD-10-CM

## 2020-07-06 LAB
ALBUMIN SERPL-MCNC: 3.7 G/DL (ref 3.4–5)
ALBUMIN/GLOB SERPL: 1 {RATIO} (ref 1–2)
ALP LIVER SERPL-CCNC: 66 U/L (ref 50–130)
ALT SERPL-CCNC: 20 U/L (ref 13–56)
ANION GAP SERPL CALC-SCNC: 8 MMOL/L (ref 0–18)
AST SERPL-CCNC: 34 U/L (ref 15–37)
BASOPHILS # BLD AUTO: 0.05 X10(3) UL (ref 0–0.2)
BASOPHILS NFR BLD AUTO: 0.8 %
BILIRUB SERPL-MCNC: 0.7 MG/DL (ref 0.1–2)
BUN BLD-MCNC: 15 MG/DL (ref 7–18)
BUN/CREAT SERPL: 17.2 (ref 10–20)
CALCIUM BLD-MCNC: 9.4 MG/DL (ref 8.5–10.1)
CHLORIDE SERPL-SCNC: 103 MMOL/L (ref 98–112)
CHOLEST SMN-MCNC: 245 MG/DL (ref ?–200)
CO2 SERPL-SCNC: 27 MMOL/L (ref 21–32)
CREAT BLD-MCNC: 0.87 MG/DL (ref 0.55–1.02)
DEPRECATED RDW RBC AUTO: 45.1 FL (ref 35.1–46.3)
EOSINOPHIL # BLD AUTO: 0.24 X10(3) UL (ref 0–0.7)
EOSINOPHIL NFR BLD AUTO: 4 %
ERYTHROCYTE [DISTWIDTH] IN BLOOD BY AUTOMATED COUNT: 12.2 % (ref 11–15)
GLOBULIN PLAS-MCNC: 3.8 G/DL (ref 2.8–4.4)
GLUCOSE BLD-MCNC: 84 MG/DL (ref 70–99)
HCT VFR BLD AUTO: 39.5 % (ref 35–48)
HDLC SERPL-MCNC: 63 MG/DL (ref 40–59)
HGB BLD-MCNC: 13 G/DL (ref 12–16)
IMM GRANULOCYTES # BLD AUTO: 0.01 X10(3) UL (ref 0–1)
IMM GRANULOCYTES NFR BLD: 0.2 %
LDLC SERPL CALC-MCNC: 168 MG/DL (ref ?–100)
LYMPHOCYTES # BLD AUTO: 2.76 X10(3) UL (ref 1–4)
LYMPHOCYTES NFR BLD AUTO: 45.7 %
M PROTEIN MFR SERPL ELPH: 7.5 G/DL (ref 6.4–8.2)
MCH RBC QN AUTO: 33 PG (ref 26–34)
MCHC RBC AUTO-ENTMCNC: 32.9 G/DL (ref 31–37)
MCV RBC AUTO: 100.3 FL (ref 80–100)
MONOCYTES # BLD AUTO: 0.53 X10(3) UL (ref 0.1–1)
MONOCYTES NFR BLD AUTO: 8.8 %
NEUTROPHILS # BLD AUTO: 2.45 X10 (3) UL (ref 1.5–7.7)
NEUTROPHILS # BLD AUTO: 2.45 X10(3) UL (ref 1.5–7.7)
NEUTROPHILS NFR BLD AUTO: 40.5 %
NONHDLC SERPL-MCNC: 182 MG/DL (ref ?–130)
OSMOLALITY SERPL CALC.SUM OF ELEC: 286 MOSM/KG (ref 275–295)
PATIENT FASTING Y/N/NP: YES
PATIENT FASTING Y/N/NP: YES
PLATELET # BLD AUTO: 194 10(3)UL (ref 150–450)
POTASSIUM SERPL-SCNC: 4.2 MMOL/L (ref 3.5–5.1)
RBC # BLD AUTO: 3.94 X10(6)UL (ref 3.8–5.3)
SODIUM SERPL-SCNC: 138 MMOL/L (ref 136–145)
TRIGL SERPL-MCNC: 72 MG/DL (ref 30–149)
TSI SER-ACNC: 1.83 MIU/ML (ref 0.36–3.74)
VIT D+METAB SERPL-MCNC: 45.6 NG/ML (ref 30–100)
VLDLC SERPL CALC-MCNC: 14 MG/DL (ref 0–30)
WBC # BLD AUTO: 6 X10(3) UL (ref 4–11)

## 2020-07-06 PROCEDURE — 82607 VITAMIN B-12: CPT

## 2020-07-06 PROCEDURE — 80053 COMPREHEN METABOLIC PANEL: CPT

## 2020-07-06 PROCEDURE — 80061 LIPID PANEL: CPT

## 2020-07-06 PROCEDURE — 36415 COLL VENOUS BLD VENIPUNCTURE: CPT

## 2020-07-06 PROCEDURE — 82306 VITAMIN D 25 HYDROXY: CPT

## 2020-07-06 PROCEDURE — 84443 ASSAY THYROID STIM HORMONE: CPT

## 2020-07-06 PROCEDURE — 82746 ASSAY OF FOLIC ACID SERUM: CPT

## 2020-07-06 PROCEDURE — 85025 COMPLETE CBC W/AUTO DIFF WBC: CPT

## 2020-07-07 DIAGNOSIS — R71.8 ELEVATED MCV: Primary | ICD-10-CM

## 2020-07-07 LAB
FOLATE SERPL-MCNC: 28.4 NG/ML (ref 8.7–?)
VIT B12 SERPL-MCNC: 536 PG/ML (ref 193–986)

## 2020-07-09 ENCOUNTER — HOSPITAL ENCOUNTER (OUTPATIENT)
Dept: ULTRASOUND IMAGING | Age: 65
Discharge: HOME | End: 2020-07-09
Attending: FAMILY MEDICINE
Payer: MEDICARE

## 2020-07-09 DIAGNOSIS — E01.0 THYROMEGALY: ICD-10-CM

## 2020-07-09 DIAGNOSIS — R71.8 ELEVATED MCV: Primary | ICD-10-CM

## 2020-07-09 PROCEDURE — 76536 US EXAM OF HEAD AND NECK: CPT | Performed by: FAMILY MEDICINE

## 2020-07-21 ENCOUNTER — HOSPITAL ENCOUNTER (OUTPATIENT)
Dept: BONE DENSITY | Age: 65
Discharge: HOME | End: 2020-07-21
Attending: OBSTETRICS & GYNECOLOGY
Payer: MEDICARE

## 2020-07-21 ENCOUNTER — HOSPITAL ENCOUNTER (OUTPATIENT)
Dept: MAMMOGRAPHY | Age: 65
Discharge: HOME | End: 2020-07-21
Attending: OBSTETRICS & GYNECOLOGY
Payer: MEDICARE

## 2020-07-21 DIAGNOSIS — Z13.820 SCREENING FOR OSTEOPOROSIS: ICD-10-CM

## 2020-07-21 PROCEDURE — 77080 DXA BONE DENSITY AXIAL: CPT | Performed by: OBSTETRICS & GYNECOLOGY

## 2020-07-21 PROCEDURE — 77067 SCR MAMMO BI INCL CAD: CPT | Performed by: OBSTETRICS & GYNECOLOGY

## 2020-07-21 PROCEDURE — 77063 BREAST TOMOSYNTHESIS BI: CPT | Performed by: OBSTETRICS & GYNECOLOGY

## 2021-03-10 ENCOUNTER — TELEPHONE (OUTPATIENT)
Dept: FAMILY MEDICINE CLINIC | Facility: CLINIC | Age: 66
End: 2021-03-10

## 2021-03-10 ENCOUNTER — PATIENT MESSAGE (OUTPATIENT)
Dept: FAMILY MEDICINE CLINIC | Facility: CLINIC | Age: 66
End: 2021-03-10

## 2021-03-10 NOTE — TELEPHONE ENCOUNTER
Call to pt-sts received covid vaccine #1 2/24/2021 at a [facility] vaccination site. Initially had sl soreness at injection site. sts 3/4/2021 developed sl warmth and noticed small firm lump at injection site. Symptoms have not worsened since first noted.

## 2021-03-10 NOTE — TELEPHONE ENCOUNTER
Call to pt-advised of larry PEREZ comments/recommendation re call info. Patient voices understanding/agrees with plan/no further questions. sts is currently in [place], so will go to immediate care in her current location.

## 2021-03-10 NOTE — TELEPHONE ENCOUNTER
Pt had 1st dose of Moderna on in Feb 24th rash at injection site. Feels a little warm to touch and a little hard lump. Wanted to report to . Pt called [facility] and was told by them to contact their PCP.

## 2021-03-10 NOTE — TELEPHONE ENCOUNTER
There have been some patients who have developed cellulitis at the injection site after receiving the vaccine--would advise having an evaluation.

## 2021-03-10 NOTE — TELEPHONE ENCOUNTER
From: Marlen Blanca  To:  Shay Zapien DO  Sent: 3/10/2021 11:54 AM CST  Subject: Non-Urgent Medical Question    Rosa Elena Leader,  Thank you for returning my call regarding the reaction I had a few days after receiving the first dose of the Moderna vaccine on 2/2

## 2021-03-15 DIAGNOSIS — Z23 NEED FOR VACCINATION: ICD-10-CM

## 2021-06-14 ENCOUNTER — LAB ENCOUNTER (OUTPATIENT)
Dept: LAB | Age: 66
End: 2021-06-14
Attending: FAMILY MEDICINE
Payer: MEDICARE

## 2021-06-14 ENCOUNTER — OFFICE VISIT (OUTPATIENT)
Dept: FAMILY MEDICINE CLINIC | Facility: CLINIC | Age: 66
End: 2021-06-14
Payer: COMMERCIAL

## 2021-06-14 VITALS
BODY MASS INDEX: 20.66 KG/M2 | HEIGHT: 64.57 IN | SYSTOLIC BLOOD PRESSURE: 106 MMHG | OXYGEN SATURATION: 96 % | DIASTOLIC BLOOD PRESSURE: 70 MMHG | WEIGHT: 122.5 LBS | HEART RATE: 71 BPM | RESPIRATION RATE: 18 BRPM

## 2021-06-14 DIAGNOSIS — N90.4 LICHEN SCLEROSUS OF FEMALE GENITALIA: ICD-10-CM

## 2021-06-14 DIAGNOSIS — M19.041 OSTEOARTHRITIS, HAND, PRIMARY LOCALIZED, RIGHT: ICD-10-CM

## 2021-06-14 DIAGNOSIS — K58.1 IRRITABLE BOWEL SYNDROME WITH CONSTIPATION: ICD-10-CM

## 2021-06-14 DIAGNOSIS — Z12.4 ENCOUNTER FOR SCREENING FOR CERVICAL CANCER: ICD-10-CM

## 2021-06-14 DIAGNOSIS — M48.02 SPINAL STENOSIS OF CERVICAL REGION: ICD-10-CM

## 2021-06-14 DIAGNOSIS — Z13.89 SCREENING FOR GENITOURINARY CONDITION: ICD-10-CM

## 2021-06-14 DIAGNOSIS — M43.16 SPONDYLOLISTHESIS OF LUMBAR REGION: ICD-10-CM

## 2021-06-14 DIAGNOSIS — M85.88 OSTEOPENIA OF SPINE: ICD-10-CM

## 2021-06-14 DIAGNOSIS — G47.00 INSOMNIA, UNSPECIFIED TYPE: ICD-10-CM

## 2021-06-14 DIAGNOSIS — R71.8 ELEVATED MCV: ICD-10-CM

## 2021-06-14 DIAGNOSIS — L90.0 LICHEN SCLEROSUS: ICD-10-CM

## 2021-06-14 DIAGNOSIS — E55.9 VITAMIN D DEFICIENCY: ICD-10-CM

## 2021-06-14 DIAGNOSIS — M47.812 OSTEOARTHRITIS OF CERVICAL SPINE, UNSPECIFIED SPINAL OSTEOARTHRITIS COMPLICATION STATUS: ICD-10-CM

## 2021-06-14 DIAGNOSIS — I73.00 RAYNAUD'S DISEASE WITHOUT GANGRENE: ICD-10-CM

## 2021-06-14 DIAGNOSIS — M72.2 PLANTAR FASCIITIS: ICD-10-CM

## 2021-06-14 DIAGNOSIS — E78.00 PURE HYPERCHOLESTEROLEMIA: ICD-10-CM

## 2021-06-14 DIAGNOSIS — Z12.31 ENCOUNTER FOR SCREENING MAMMOGRAM FOR MALIGNANT NEOPLASM OF BREAST: ICD-10-CM

## 2021-06-14 DIAGNOSIS — B35.1 ONYCHOMYCOSIS: ICD-10-CM

## 2021-06-14 DIAGNOSIS — H91.93 BILATERAL HEARING LOSS, UNSPECIFIED HEARING LOSS TYPE: ICD-10-CM

## 2021-06-14 DIAGNOSIS — Z79.899 MEDICATION MANAGEMENT: ICD-10-CM

## 2021-06-14 DIAGNOSIS — Z91.09 ENVIRONMENTAL ALLERGIES: ICD-10-CM

## 2021-06-14 DIAGNOSIS — M22.41 CHONDROMALACIA OF RIGHT PATELLA: ICD-10-CM

## 2021-06-14 DIAGNOSIS — Z71.85 VACCINE COUNSELING: ICD-10-CM

## 2021-06-14 DIAGNOSIS — Z00.00 ENCOUNTER FOR ANNUAL HEALTH EXAMINATION: Primary | ICD-10-CM

## 2021-06-14 DIAGNOSIS — M75.01 ADHESIVE CAPSULITIS OF RIGHT SHOULDER: ICD-10-CM

## 2021-06-14 PROCEDURE — 87624 HPV HI-RISK TYP POOLED RSLT: CPT | Performed by: FAMILY MEDICINE

## 2021-06-14 PROCEDURE — 80053 COMPREHEN METABOLIC PANEL: CPT

## 2021-06-14 PROCEDURE — 80061 LIPID PANEL: CPT

## 2021-06-14 PROCEDURE — 85025 COMPLETE CBC W/AUTO DIFF WBC: CPT

## 2021-06-14 PROCEDURE — 82306 VITAMIN D 25 HYDROXY: CPT

## 2021-06-14 PROCEDURE — 36415 COLL VENOUS BLD VENIPUNCTURE: CPT

## 2021-06-14 PROCEDURE — 3078F DIAST BP <80 MM HG: CPT | Performed by: FAMILY MEDICINE

## 2021-06-14 PROCEDURE — 96160 PT-FOCUSED HLTH RISK ASSMT: CPT | Performed by: FAMILY MEDICINE

## 2021-06-14 PROCEDURE — G0438 PPPS, INITIAL VISIT: HCPCS | Performed by: FAMILY MEDICINE

## 2021-06-14 PROCEDURE — 81001 URINALYSIS AUTO W/SCOPE: CPT

## 2021-06-14 PROCEDURE — 3074F SYST BP LT 130 MM HG: CPT | Performed by: FAMILY MEDICINE

## 2021-06-14 PROCEDURE — 3008F BODY MASS INDEX DOCD: CPT | Performed by: FAMILY MEDICINE

## 2021-06-14 PROCEDURE — 88175 CYTOPATH C/V AUTO FLUID REDO: CPT | Performed by: FAMILY MEDICINE

## 2021-06-14 PROCEDURE — 99397 PER PM REEVAL EST PAT 65+ YR: CPT | Performed by: FAMILY MEDICINE

## 2021-06-14 PROCEDURE — 84443 ASSAY THYROID STIM HORMONE: CPT

## 2021-06-14 RX ORDER — CLOBETASOL PROPIONATE 0.5 MG/G
1 CREAM TOPICAL 2 TIMES DAILY PRN
Qty: 30 G | Refills: 1 | Status: SHIPPED | OUTPATIENT
Start: 2021-06-14

## 2021-06-14 NOTE — PATIENT INSTRUCTIONS
Kenney Robledo's SCREENING SCHEDULE   Tests on this list are recommended by your physician but may not be covered, or covered at this frequency, by your insurer. Please check with your insurance carrier before scheduling to verify coverage.    PREVENTATIVE 07/21/2020      No recommendations at this time   Pap and Pelvic    Pap   Covered every 2 years for women at normal risk;  Annually if at high risk -  No recommendations at this time    Chlamydia Annually if high risk -  No recommendations at this time   Sc regarding Advance Directives.

## 2021-06-14 NOTE — PROGRESS NOTES
HPI:   Anna Galloway is a 76year old female who presents for a MA (Medicare Advantage) Supervisit (Once per calendar year). Pt. Is here for MA supervisit. Pt. Complains of plantar fascitis bilaterally.     No topic due editable text        Fall/Risk Ass Encounters:  06/14/21 : 122 lb 8 oz (55.6 kg)  06/30/20 : 124 lb (56.2 kg)  06/15/20 : 126 lb (57.2 kg)     Last Cholesterol Labs:   Lab Results   Component Value Date    CHOLEST 245 (H) 07/06/2020    HDL 63 (H) 07/06/2020     (H) 07/06/2020    TRIG colposcopy, cervix w/upper adjacent vagina; w/biopsy(s), cervix ( & );  (, [id number] [address]); other surgical history (); other surgical history (); madelaine biopsy stereo nodule 1 site left (cpt=19081); and nail removal (, ).     Her fa calculated from the following:    Height as of this encounter: 5' 4.57\" (1.64 m). Weight as of this encounter: 122 lb 8 oz (55.6 kg).     Medicare Hearing Assessment  (Required for AWV/SWV)    Hearing Screening    Time taken: 6/14/2021  9:06 AM  Entry U History   Administered Date(s) Administered   • >=3 YRS FLUZONE OR FLUARIX QUAD PRESERVE FREE SINGLE DOSE (75989) FLU CLINIC 09/24/2016   • Covid-19 Vaccine Moderna 100 mcg/0.5 ml 02/24/2021, 03/24/2021   • FLULAVAL 6 months & older 0.5 ml Prefilled syring 2D+3D SCREENING BILAT (CPT=77067/52191); Future  -     [facility] 2D+3D SCREENING BILAT (CPT=77067/63194); Future    Elevated MCV -- recheck CBC  -     CBC WITH DIFFERENTIAL WITH PLATELET;  Future    Screening for genitourinary condition -- done today  - SCREENING BILAT (CPT=77067/68171); Future; Expected date: 06/14/2021  -     [facility] 2D+3D SCREENING BILAT (CPT=77067/34840); Future; Expected date: 07/22/2021  17. Elevated MCV  -     CBC With Differential With Platelet;  Future; Expected date: 06/14/2021 One screening every 12 months if never tested or if previously tested but not diagnosed with pre-diabetes   One screening every 6 months if diagnosed with pre-diabetes Lab Results   Component Value Date    GLU 84 07/06/2020        Cardiovascular Disease covered for patients aged 35-39 07/21/2020    Mammogram due on 07/21/2021    Immunizations    Influenza Covered once per flu season  Please get every year 09/05/2020  No recommendations at this time    Pneumococcal Each vaccine ([id number] & [id number]) co

## 2021-07-02 ENCOUNTER — OFFICE VISIT (OUTPATIENT)
Dept: HEMATOLOGY/ONCOLOGY | Facility: HOSPITAL | Age: 66
End: 2021-07-02
Attending: INTERNAL MEDICINE
Payer: MEDICARE

## 2021-07-02 VITALS
OXYGEN SATURATION: 97 % | RESPIRATION RATE: 18 BRPM | DIASTOLIC BLOOD PRESSURE: 77 MMHG | BODY MASS INDEX: 21 KG/M2 | SYSTOLIC BLOOD PRESSURE: 138 MMHG | HEART RATE: 72 BPM | TEMPERATURE: 96 F | WEIGHT: 123.38 LBS

## 2021-07-02 DIAGNOSIS — R93.7 ABNORMAL MRI, LUMBAR SPINE: ICD-10-CM

## 2021-07-02 DIAGNOSIS — R91.8 MULTIPLE LUNG NODULES ON CT: ICD-10-CM

## 2021-07-02 DIAGNOSIS — D75.89 MACROCYTOSIS WITHOUT ANEMIA: Primary | ICD-10-CM

## 2021-07-02 LAB
BASOPHILS # BLD AUTO: 0.04 X10(3) UL (ref 0–0.2)
BASOPHILS NFR BLD AUTO: 0.7 %
DEPRECATED RDW RBC AUTO: 44.1 FL (ref 35.1–46.3)
EOSINOPHIL # BLD AUTO: 0.21 X10(3) UL (ref 0–0.7)
EOSINOPHIL NFR BLD AUTO: 3.4 %
ERYTHROCYTE [DISTWIDTH] IN BLOOD BY AUTOMATED COUNT: 12.1 % (ref 11–15)
FOLATE SERPL-MCNC: 36 NG/ML (ref 8.7–?)
HCT VFR BLD AUTO: 38.5 %
HGB BLD-MCNC: 13 G/DL
HGB RETIC QN AUTO: 39.8 PG (ref 28.2–36.6)
IMM GRANULOCYTES # BLD AUTO: 0.01 X10(3) UL (ref 0–1)
IMM GRANULOCYTES NFR BLD: 0.2 %
IMM RETICS NFR: 0.02 RATIO (ref 0.1–0.3)
LDH SERPL L TO P-CCNC: 251 U/L
LYMPHOCYTES # BLD AUTO: 2.46 X10(3) UL (ref 1–4)
LYMPHOCYTES NFR BLD AUTO: 40.4 %
MCH RBC QN AUTO: 33.6 PG (ref 26–34)
MCHC RBC AUTO-ENTMCNC: 33.8 G/DL (ref 31–37)
MCV RBC AUTO: 99.5 FL
MONOCYTES # BLD AUTO: 0.43 X10(3) UL (ref 0.1–1)
MONOCYTES NFR BLD AUTO: 7.1 %
NEUTROPHILS # BLD AUTO: 2.94 X10 (3) UL (ref 1.5–7.7)
NEUTROPHILS # BLD AUTO: 2.94 X10(3) UL (ref 1.5–7.7)
NEUTROPHILS NFR BLD AUTO: 48.2 %
PLATELET # BLD AUTO: 239 10(3)UL (ref 150–450)
RBC # BLD AUTO: 3.87 X10(6)UL
RETICS # AUTO: 33.7 X10(3) UL (ref 22.5–147.5)
RETICS/RBC NFR AUTO: 0.9 %
VIT B12 SERPL-MCNC: 518 PG/ML (ref 193–986)
WBC # BLD AUTO: 6.1 X10(3) UL (ref 4–11)

## 2021-07-02 PROCEDURE — 99205 OFFICE O/P NEW HI 60 MIN: CPT | Performed by: INTERNAL MEDICINE

## 2021-07-02 NOTE — PATIENT INSTRUCTIONS
For triage nurse: [phone] Monday through Friday 7:30-5:00.  *Please note this is a new phone number*    After hours or weekends for emergent needs:  [phone].      To schedule diagnostic testing: Central Scheduling: [place] [id number]

## 2021-07-02 NOTE — CONSULTS
[facility] Report of Consultation    Patient Name: Tomy Gomez   YOB: 1955   Medical Record Number: [id number]   CSN: [id number]   Consulting Physician: Lavonne Russell MD  Referring Physician(s): Jacqueline Goodwin DO      Date of Consultation iliac bones which were described as indeterminate and raised the possibility of metastatic disease. She saw Ara Muniz at [facility] and w/u was performed which included labs as well as CT C/A/P and bone scan.  CT showed no lesions seen within the lumbar spi Lipids Father    • Other (Other) Father         nephrolithiasis   • Hypertension Mother    • Other (Other) Mother         dementia   • Diabetes Maternal Grandmother         No   • Heart Disorder Maternal Grandmother         No   • Hypertension Maternal Gra Asked        Stress Concern: Not Asked        Weight Concern: Not Asked        Special Diet: Not Asked        Back Care: Not Asked        Exercise: Yes          treadmill daily        Bike Helmet: Not Asked        Seat Belt: Yes        Self-Exams: Not Aske Reported on 7/2/2021 ), Disp: 6.6 mL, Rfl: 11    Review of Systems:  A 14-point ROS was done with pertinent positives and negative per the HPI    Vital Signs:  /77 (BP Location: Left arm, Patient Position: Sitting, Cuff Size: adult)   Pulse 72   Temp Absolute Prelim 2.57 06/26/2019 0811    WBC 5.5 06/14/2021 1024    WBC 6.0 07/06/2020 1002    WBC 5.9 06/26/2019 0811    WHITE BLOOD CELL COUNT 9.1 02/04/2013 0538    PLATELET COUNT 773 71/75/6027 0538    .0 06/14/2021 1024    .0 07/06/2020 1 Alkaline Phosphatase 70 06/26/2019 0811    ALKALINE PHOSPHATASE 65 02/04/2013 0538    AST 38 (H) 06/14/2021 1024    AST 34 07/06/2020 1002    AST 32 06/26/2019 0811    AST 34 02/04/2013 0538    ALT 23 06/14/2021 1024    ALT 20 07/06/2020 1002    ALT 22 06/ the recent studies. Negative study. IMPRESSION:     Negative bone scan. CT CHEST ABDOMEN PELVIS W CONTRAST     CLINICAL HISTORY: Abnormal MRI of the lumbar spine. Assess for metastatic disease.      TECHNIQUE:   CT of the chest, abdomen, and pelv BOWEL: Moderate stool in the colon. No bowel obstruction. No findings of acute colonic diverticulitis. Normal caliber appendix. Diverticulum arises from the medial aspect of the second portion of the duodenum.      ABDOMINAL WALL: Minimal fat-containing degeneration. L1-L2 disc level: No disc herniation, central stenosis, foraminal narrowing, or significant facet degeneration. L2-L3 disc level: Mild disc bulge and facet degeneration.  No disc herniation, central stenosis, foraminal narrowing, or co pathologic enhancement in lumbar spinal canal.          EXAM: MRI SCAN OF LUMBAR SPINE WITHOUT CONTRAST.       HISTORY: Leg pain. Low back pain. Sciatica.      COMPARISON: Lumbar spine x-rays January 8, 2018     FINDINGS:     Scattered subcentimeter areas o significant central canal stenosis or foraminal narrowing. Dilated appearing right extrarenal pelvis. IMPRESSION:     Grade 1 anterolisthesis of L4 on L5 with mild/moderate canal stenosis at this level.  Mild compression upon the right L4 nerve root bulge. Minimal canal stenosis. Mild right and minimal left foraminal narrowing. L4-L5: 3 mm anterolisthesis of L4 and L5 resulting in some uncovering of the posterior disc. Moderate facet joint hypertrophy.  Prominence of fluid in the left facet joint w have a calcium scoring CT. Will await for these results as does include a CT chest overread. Hopefully will include areas where previous nodules were seen. If not will recommended dedicated CT chest.       Labs reviewed with her at length.    I reviewed her

## 2021-07-06 LAB
ALBUMIN SERPL ELPH-MCNC: 4.37 G/DL (ref 3.75–5.21)
ALBUMIN/GLOB SERPL: 1.49 {RATIO} (ref 1–2)
ALPHA1 GLOB SERPL ELPH-MCNC: 0.25 G/DL (ref 0.19–0.46)
ALPHA2 GLOB SERPL ELPH-MCNC: 0.7 G/DL (ref 0.48–1.05)
B-GLOBULIN SERPL ELPH-MCNC: 0.9 G/DL (ref 0.68–1.23)
GAMMA GLOB SERPL ELPH-MCNC: 1.08 G/DL (ref 0.62–1.7)
KAPPA LC FREE SER-MCNC: 2.36 MG/DL (ref 0.33–1.94)
KAPPA LC FREE/LAMBDA FREE SER NEPH: 1.13 {RATIO} (ref 0.26–1.65)
LAMBDA LC FREE SERPL-MCNC: 2.1 MG/DL (ref 0.57–2.63)
M PROTEIN MFR SERPL ELPH: 7.3 G/DL (ref 6.4–8.2)

## 2021-07-07 LAB — MMA: 0.22 UMOL/L

## 2021-07-12 ENCOUNTER — HOSPITAL ENCOUNTER (OUTPATIENT)
Dept: CT IMAGING | Facility: HOSPITAL | Age: 66
Discharge: HOME | End: 2021-07-12
Attending: FAMILY MEDICINE

## 2021-07-12 DIAGNOSIS — Z13.6 ENCOUNTER FOR SCREENING FOR CORONARY ARTERY DISEASE: ICD-10-CM

## 2021-07-15 ENCOUNTER — TELEPHONE (OUTPATIENT)
Dept: FAMILY MEDICINE CLINIC | Facility: CLINIC | Age: 66
End: 2021-07-15

## 2021-07-15 NOTE — TELEPHONE ENCOUNTER
Please call patient. Incidental finding on EBCT:  CARDIAC INCIDENTAL FINDING: There is atherosclerotic plaquing in the thoracic aorta. Please have her follow up with Dr. Quentin Vasquez to discuss.

## 2021-07-16 NOTE — TELEPHONE ENCOUNTER
Call to pt-advised of and explained info noted below from lynne NICHOLS/on call-dr pretty out of [place] this wk/returns 7/19/21. Pt voices concern re this info, sts would like to see dr Brad johns but will be out of town the next 2wks.  Asking for possible a

## 2021-08-04 ENCOUNTER — HOSPITAL ENCOUNTER (OUTPATIENT)
Dept: MAMMOGRAPHY | Age: 66
Discharge: HOME | End: 2021-08-04
Attending: FAMILY MEDICINE
Payer: MEDICARE

## 2021-08-04 DIAGNOSIS — Z12.31 ENCOUNTER FOR SCREENING MAMMOGRAM FOR MALIGNANT NEOPLASM OF BREAST: ICD-10-CM

## 2021-08-04 PROCEDURE — 77063 BREAST TOMOSYNTHESIS BI: CPT | Performed by: FAMILY MEDICINE

## 2021-08-04 PROCEDURE — 77067 SCR MAMMO BI INCL CAD: CPT | Performed by: FAMILY MEDICINE

## 2021-08-05 ENCOUNTER — HOSPITAL ENCOUNTER (OUTPATIENT)
Dept: ULTRASOUND IMAGING | Age: 66
Discharge: HOME | End: 2021-08-05
Attending: INTERNAL MEDICINE
Payer: MEDICARE

## 2021-08-05 DIAGNOSIS — D75.89 MACROCYTOSIS WITHOUT ANEMIA: ICD-10-CM

## 2021-08-05 PROCEDURE — 76700 US EXAM ABDOM COMPLETE: CPT | Performed by: INTERNAL MEDICINE

## 2021-08-13 ENCOUNTER — OFFICE VISIT (OUTPATIENT)
Dept: FAMILY MEDICINE CLINIC | Facility: CLINIC | Age: 66
End: 2021-08-13
Payer: COMMERCIAL

## 2021-08-13 VITALS
HEIGHT: 64.5 IN | RESPIRATION RATE: 16 BRPM | HEART RATE: 66 BPM | WEIGHT: 120.75 LBS | DIASTOLIC BLOOD PRESSURE: 70 MMHG | BODY MASS INDEX: 20.36 KG/M2 | SYSTOLIC BLOOD PRESSURE: 122 MMHG

## 2021-08-13 DIAGNOSIS — R93.1 ABNORMAL CT SCAN OF HEART: ICD-10-CM

## 2021-08-13 DIAGNOSIS — Z79.899 MEDICATION MANAGEMENT: ICD-10-CM

## 2021-08-13 DIAGNOSIS — Z71.89 COUNSELING AND COORDINATION OF CARE: ICD-10-CM

## 2021-08-13 DIAGNOSIS — E78.00 PURE HYPERCHOLESTEROLEMIA: Primary | ICD-10-CM

## 2021-08-13 DIAGNOSIS — B35.1 ONYCHOMYCOSIS: ICD-10-CM

## 2021-08-13 DIAGNOSIS — J84.10 CALCIFIED GRANULOMA OF LUNG (HCC): ICD-10-CM

## 2021-08-13 PROCEDURE — 3008F BODY MASS INDEX DOCD: CPT | Performed by: FAMILY MEDICINE

## 2021-08-13 PROCEDURE — 3074F SYST BP LT 130 MM HG: CPT | Performed by: FAMILY MEDICINE

## 2021-08-13 PROCEDURE — 3078F DIAST BP <80 MM HG: CPT | Performed by: FAMILY MEDICINE

## 2021-08-13 PROCEDURE — 99214 OFFICE O/P EST MOD 30 MIN: CPT | Performed by: FAMILY MEDICINE

## 2021-08-13 NOTE — PROGRESS NOTES
Anna Ramírez is a 76year old female. HPI:   PT. Is here to go over her ultrafast heart scan result. PT. States that they thought she might have Multiple Myeloma at [facility] in 2018.   Testing was negative and wanted to review CT chest.  Ultrafast CT ch disease    • Urinary incontinence    • Vestibulitis, vulvar 2010   • Vulvodynia 2010      Social History:  Social History    Tobacco Use      Smoking status: Never Smoker      Smokeless tobacco: Never Used    Vaping Use      Vaping Use: Never used    Alcoh 26.0 - 34.0 pg    MCHC 33.8 31.0 - 37.0 g/dL    RDW 12.1 11.0 - 15.0 %    RDW-SD 44.1 35.1 - 46.3 fL    Neutrophil Absolute Prelim 2.94 1.50 - 7.70 x10 (3) uL    Neutrophil Absolute 2.94 1.50 - 7.70 x10(3) uL    Lymphocyte Absolute 2.46 1.00 - 4.00 x10(3) months and lets see if improves with diet. If not, advise atorvastatin 10 mg nightly. Advised to speak with Dr. Teresa Saleh regarding CT chest follow up. May have her right great toe nail removed.   Revewed ultrafast heart scan and overread with the pt.and a

## 2021-08-20 DIAGNOSIS — R91.8 MULTIPLE LUNG NODULES ON CT: Primary | ICD-10-CM

## 2021-09-01 ENCOUNTER — HOSPITAL ENCOUNTER (OUTPATIENT)
Dept: CT IMAGING | Age: 66
Discharge: HOME | End: 2021-09-01
Attending: INTERNAL MEDICINE
Payer: MEDICARE

## 2021-09-01 DIAGNOSIS — R91.8 MULTIPLE LUNG NODULES ON CT: ICD-10-CM

## 2021-09-01 PROCEDURE — 71250 CT THORAX DX C-: CPT | Performed by: INTERNAL MEDICINE

## 2021-10-07 ENCOUNTER — MED REC SCAN ONLY (OUTPATIENT)
Dept: FAMILY MEDICINE CLINIC | Facility: CLINIC | Age: 66
End: 2021-10-07

## 2021-10-11 ENCOUNTER — LAB ENCOUNTER (OUTPATIENT)
Dept: LAB | Age: 66
End: 2021-10-11
Attending: FAMILY MEDICINE
Payer: MEDICARE

## 2021-10-11 DIAGNOSIS — E78.00 PURE HYPERCHOLESTEROLEMIA: ICD-10-CM

## 2021-10-11 DIAGNOSIS — E87.1 LOW SODIUM LEVELS: ICD-10-CM

## 2021-10-11 DIAGNOSIS — R79.89 ELEVATED LFTS: ICD-10-CM

## 2021-10-11 PROCEDURE — 36415 COLL VENOUS BLD VENIPUNCTURE: CPT

## 2021-10-11 PROCEDURE — 80053 COMPREHEN METABOLIC PANEL: CPT

## 2021-10-11 PROCEDURE — 80061 LIPID PANEL: CPT

## 2021-10-13 DIAGNOSIS — Z79.899 ON STATIN THERAPY: ICD-10-CM

## 2021-10-13 DIAGNOSIS — E78.00 PURE HYPERCHOLESTEROLEMIA: Primary | ICD-10-CM

## 2021-10-13 RX ORDER — ATORVASTATIN CALCIUM 10 MG/1
10 TABLET, FILM COATED ORAL NIGHTLY
Qty: 90 TABLET | Refills: 0 | Status: SHIPPED | OUTPATIENT
Start: 2021-10-13 | End: 2022-01-07

## 2022-01-07 DIAGNOSIS — E78.00 PURE HYPERCHOLESTEROLEMIA: ICD-10-CM

## 2022-01-07 DIAGNOSIS — Z79.899 ON STATIN THERAPY: ICD-10-CM

## 2022-01-07 RX ORDER — ATORVASTATIN CALCIUM 10 MG/1
TABLET, FILM COATED ORAL
Qty: 90 TABLET | Refills: 0 | Status: SHIPPED | OUTPATIENT
Start: 2022-01-07

## 2022-01-12 ENCOUNTER — LAB ENCOUNTER (OUTPATIENT)
Dept: LAB | Age: 67
End: 2022-01-12
Attending: FAMILY MEDICINE
Payer: MEDICARE

## 2022-01-12 DIAGNOSIS — E78.00 PURE HYPERCHOLESTEROLEMIA: ICD-10-CM

## 2022-01-12 DIAGNOSIS — Z79.899 ON STATIN THERAPY: ICD-10-CM

## 2022-01-12 LAB
ALBUMIN SERPL-MCNC: 3.7 G/DL (ref 3.4–5)
ALBUMIN/GLOB SERPL: 1.1 {RATIO} (ref 1–2)
ALP LIVER SERPL-CCNC: 79 U/L
ALT SERPL-CCNC: 21 U/L
ANION GAP SERPL CALC-SCNC: 4 MMOL/L (ref 0–18)
AST SERPL-CCNC: 29 U/L (ref 15–37)
BILIRUB SERPL-MCNC: 0.6 MG/DL (ref 0.1–2)
BUN BLD-MCNC: 17 MG/DL (ref 7–18)
CALCIUM BLD-MCNC: 9.6 MG/DL (ref 8.5–10.1)
CHLORIDE SERPL-SCNC: 103 MMOL/L (ref 98–112)
CHOLEST SERPL-MCNC: 171 MG/DL (ref ?–200)
CO2 SERPL-SCNC: 32 MMOL/L (ref 21–32)
CREAT BLD-MCNC: 0.76 MG/DL
FASTING PATIENT LIPID ANSWER: YES
FASTING STATUS PATIENT QL REPORTED: YES
GLOBULIN PLAS-MCNC: 3.4 G/DL (ref 2.8–4.4)
GLUCOSE BLD-MCNC: 87 MG/DL (ref 70–99)
HDLC SERPL-MCNC: 73 MG/DL (ref 40–59)
LDLC SERPL CALC-MCNC: 86 MG/DL (ref ?–100)
NONHDLC SERPL-MCNC: 98 MG/DL (ref ?–130)
OSMOLALITY SERPL CALC.SUM OF ELEC: 289 MOSM/KG (ref 275–295)
POTASSIUM SERPL-SCNC: 4.2 MMOL/L (ref 3.5–5.1)
PROT SERPL-MCNC: 7.1 G/DL (ref 6.4–8.2)
SODIUM SERPL-SCNC: 139 MMOL/L (ref 136–145)
TRIGL SERPL-MCNC: 63 MG/DL (ref 30–149)
VLDLC SERPL CALC-MCNC: 10 MG/DL (ref 0–30)

## 2022-01-12 PROCEDURE — 80053 COMPREHEN METABOLIC PANEL: CPT

## 2022-01-12 PROCEDURE — 80061 LIPID PANEL: CPT

## 2022-01-18 ENCOUNTER — OFFICE VISIT (OUTPATIENT)
Dept: FAMILY MEDICINE CLINIC | Facility: CLINIC | Age: 67
End: 2022-01-18
Payer: COMMERCIAL

## 2022-01-18 VITALS
RESPIRATION RATE: 16 BRPM | DIASTOLIC BLOOD PRESSURE: 60 MMHG | HEART RATE: 64 BPM | BODY MASS INDEX: 19.73 KG/M2 | HEIGHT: 64.5 IN | SYSTOLIC BLOOD PRESSURE: 110 MMHG | WEIGHT: 117 LBS

## 2022-01-18 DIAGNOSIS — Z13.89 SCREENING FOR GENITOURINARY CONDITION: ICD-10-CM

## 2022-01-18 DIAGNOSIS — Z78.0 MENOPAUSE: ICD-10-CM

## 2022-01-18 DIAGNOSIS — F43.0 STRESS REACTION: ICD-10-CM

## 2022-01-18 DIAGNOSIS — Z79.899 ON STATIN THERAPY: ICD-10-CM

## 2022-01-18 DIAGNOSIS — E78.00 PURE HYPERCHOLESTEROLEMIA: Primary | ICD-10-CM

## 2022-01-18 DIAGNOSIS — Z00.00 LABORATORY EXAMINATION ORDERED AS PART OF A ROUTINE GENERAL MEDICAL EXAMINATION: ICD-10-CM

## 2022-01-18 DIAGNOSIS — J84.10 CALCIFIED GRANULOMA OF LUNG (HCC): ICD-10-CM

## 2022-01-18 DIAGNOSIS — Z79.899 MEDICATION MANAGEMENT: ICD-10-CM

## 2022-01-18 PROBLEM — J98.4 CALCIFIED GRANULOMA OF LUNG: Status: ACTIVE | Noted: 2022-01-18

## 2022-01-18 PROCEDURE — 3078F DIAST BP <80 MM HG: CPT | Performed by: FAMILY MEDICINE

## 2022-01-18 PROCEDURE — 3008F BODY MASS INDEX DOCD: CPT | Performed by: FAMILY MEDICINE

## 2022-01-18 PROCEDURE — 99214 OFFICE O/P EST MOD 30 MIN: CPT | Performed by: FAMILY MEDICINE

## 2022-01-18 PROCEDURE — 3074F SYST BP LT 130 MM HG: CPT | Performed by: FAMILY MEDICINE

## 2022-01-18 NOTE — PROGRESS NOTES
Crissy Salazar is a 78year old female. HPI:   Pt. Is here for follow up. Dyslipidemia -- stable on atorvastatin; no side effects  Last dexa was 2018 and she was surprised. Due for labs again in 6/2022.   Adopted daughter  -- age 23 -- diagnosed with aggress orders placed or performed in visit on 01/12/22   LIPID PANEL   Result Value Ref Range    Cholesterol, Total 171 <200 mg/dL    HDL Cholesterol 73 (H) 40 - 59 mg/dL    Triglycerides 63 30 - 149 mg/dL    LDL Cholesterol 86 <100 mg/dL    VLDL 10 0 - 30 mg/dL examination ordered as part of a routine general medical examination  Screening for genitourinary condition  Menopause  On statin therapy  Medication management  Stress reaction    Orders Placed This Encounter      CBC With Differential With Platelet

## 2022-01-24 ENCOUNTER — HOSPITAL ENCOUNTER (OUTPATIENT)
Dept: BONE DENSITY | Age: 67
Discharge: HOME | End: 2022-01-24
Attending: FAMILY MEDICINE
Payer: MEDICARE

## 2022-01-24 DIAGNOSIS — Z78.0 MENOPAUSE: ICD-10-CM

## 2022-01-24 PROCEDURE — 77080 DXA BONE DENSITY AXIAL: CPT | Performed by: FAMILY MEDICINE

## 2022-01-24 NOTE — PROGRESS NOTES
Dear Robert Sharpe,    Your bone density/dexa shows stable/mildly improved osteopenia. Focus on weight bearing exercises, adequate vitamin D and calcium intake. I recommend repeating your dexa in 2 years.     Sincerely,  Dr. Lev Villalta

## 2022-04-05 RX ORDER — ATORVASTATIN CALCIUM 10 MG/1
TABLET, FILM COATED ORAL
Qty: 90 TABLET | Refills: 0 | Status: SHIPPED | OUTPATIENT
Start: 2022-04-05

## 2022-05-18 ENCOUNTER — LAB ENCOUNTER (OUTPATIENT)
Dept: LAB | Age: 67
End: 2022-05-18
Attending: FAMILY MEDICINE
Payer: MEDICARE

## 2022-05-18 DIAGNOSIS — Z13.89 SCREENING FOR GENITOURINARY CONDITION: ICD-10-CM

## 2022-05-18 DIAGNOSIS — Z00.00 LABORATORY EXAMINATION ORDERED AS PART OF A ROUTINE GENERAL MEDICAL EXAMINATION: ICD-10-CM

## 2022-05-18 LAB
ALBUMIN SERPL-MCNC: 3.4 G/DL (ref 3.4–5)
ALBUMIN/GLOB SERPL: 0.9 {RATIO} (ref 1–2)
ALP LIVER SERPL-CCNC: 80 U/L
ALT SERPL-CCNC: 24 U/L
ANION GAP SERPL CALC-SCNC: 5 MMOL/L (ref 0–18)
AST SERPL-CCNC: 29 U/L (ref 15–37)
BASOPHILS # BLD AUTO: 0.05 X10(3) UL (ref 0–0.2)
BASOPHILS NFR BLD AUTO: 0.9 %
BILIRUB SERPL-MCNC: 0.7 MG/DL (ref 0.1–2)
BILIRUB UR QL STRIP.AUTO: NEGATIVE
BUN BLD-MCNC: 14 MG/DL (ref 7–18)
CALCIUM BLD-MCNC: 9 MG/DL (ref 8.5–10.1)
CHLORIDE SERPL-SCNC: 104 MMOL/L (ref 98–112)
CHOLEST SERPL-MCNC: 158 MG/DL (ref ?–200)
CLARITY UR REFRACT.AUTO: CLEAR
CO2 SERPL-SCNC: 30 MMOL/L (ref 21–32)
COLOR UR AUTO: YELLOW
CREAT BLD-MCNC: 0.83 MG/DL
EOSINOPHIL # BLD AUTO: 0.3 X10(3) UL (ref 0–0.7)
EOSINOPHIL NFR BLD AUTO: 5.7 %
ERYTHROCYTE [DISTWIDTH] IN BLOOD BY AUTOMATED COUNT: 12.2 %
FASTING PATIENT LIPID ANSWER: YES
FASTING STATUS PATIENT QL REPORTED: YES
GLOBULIN PLAS-MCNC: 3.9 G/DL (ref 2.8–4.4)
GLUCOSE BLD-MCNC: 89 MG/DL (ref 70–99)
GLUCOSE UR STRIP.AUTO-MCNC: NEGATIVE MG/DL
HCT VFR BLD AUTO: 37.6 %
HDLC SERPL-MCNC: 66 MG/DL (ref 40–59)
HGB BLD-MCNC: 12.4 G/DL
IMM GRANULOCYTES # BLD AUTO: 0.01 X10(3) UL (ref 0–1)
IMM GRANULOCYTES NFR BLD: 0.2 %
KETONES UR STRIP.AUTO-MCNC: NEGATIVE MG/DL
LDLC SERPL CALC-MCNC: 82 MG/DL (ref ?–100)
LYMPHOCYTES # BLD AUTO: 2.41 X10(3) UL (ref 1–4)
LYMPHOCYTES NFR BLD AUTO: 45.6 %
MCH RBC QN AUTO: 33.7 PG (ref 26–34)
MCHC RBC AUTO-ENTMCNC: 33 G/DL (ref 31–37)
MCV RBC AUTO: 102.2 FL
MONOCYTES # BLD AUTO: 0.43 X10(3) UL (ref 0.1–1)
MONOCYTES NFR BLD AUTO: 8.1 %
NEUTROPHILS # BLD AUTO: 2.09 X10 (3) UL (ref 1.5–7.7)
NEUTROPHILS # BLD AUTO: 2.09 X10(3) UL (ref 1.5–7.7)
NEUTROPHILS NFR BLD AUTO: 39.5 %
NITRITE UR QL STRIP.AUTO: NEGATIVE
NONHDLC SERPL-MCNC: 92 MG/DL (ref ?–130)
OSMOLALITY SERPL CALC.SUM OF ELEC: 288 MOSM/KG (ref 275–295)
PH UR STRIP.AUTO: 5 [PH] (ref 5–8)
PLATELET # BLD AUTO: 280 10(3)UL (ref 150–450)
POTASSIUM SERPL-SCNC: 4.3 MMOL/L (ref 3.5–5.1)
PROT SERPL-MCNC: 7.3 G/DL (ref 6.4–8.2)
PROT UR STRIP.AUTO-MCNC: NEGATIVE MG/DL
RBC # BLD AUTO: 3.68 X10(6)UL
SODIUM SERPL-SCNC: 139 MMOL/L (ref 136–145)
SP GR UR STRIP.AUTO: 1.02 (ref 1–1.03)
TRIGL SERPL-MCNC: 48 MG/DL (ref 30–149)
TSI SER-ACNC: 3.15 MIU/ML (ref 0.36–3.74)
UROBILINOGEN UR STRIP.AUTO-MCNC: <2 MG/DL
VIT D+METAB SERPL-MCNC: 46.4 NG/ML (ref 30–100)
VLDLC SERPL CALC-MCNC: 8 MG/DL (ref 0–30)
WBC # BLD AUTO: 5.3 X10(3) UL (ref 4–11)

## 2022-05-18 PROCEDURE — 80053 COMPREHEN METABOLIC PANEL: CPT

## 2022-05-18 PROCEDURE — 84443 ASSAY THYROID STIM HORMONE: CPT

## 2022-05-18 PROCEDURE — 80061 LIPID PANEL: CPT

## 2022-05-18 PROCEDURE — 82306 VITAMIN D 25 HYDROXY: CPT

## 2022-05-18 PROCEDURE — 85025 COMPLETE CBC W/AUTO DIFF WBC: CPT

## 2022-05-18 PROCEDURE — 81001 URINALYSIS AUTO W/SCOPE: CPT

## 2022-05-25 ENCOUNTER — OFFICE VISIT (OUTPATIENT)
Dept: FAMILY MEDICINE CLINIC | Facility: CLINIC | Age: 67
End: 2022-05-25
Payer: COMMERCIAL

## 2022-05-25 VITALS
DIASTOLIC BLOOD PRESSURE: 68 MMHG | RESPIRATION RATE: 16 BRPM | OXYGEN SATURATION: 99 % | BODY MASS INDEX: 19.9 KG/M2 | SYSTOLIC BLOOD PRESSURE: 102 MMHG | HEART RATE: 65 BPM | WEIGHT: 118 LBS | HEIGHT: 64.5 IN | TEMPERATURE: 97 F

## 2022-05-25 DIAGNOSIS — N88.2 STENOTIC CERVICAL OS: ICD-10-CM

## 2022-05-25 DIAGNOSIS — M48.02 SPINAL STENOSIS OF CERVICAL REGION: ICD-10-CM

## 2022-05-25 DIAGNOSIS — Z71.85 VACCINE COUNSELING: ICD-10-CM

## 2022-05-25 DIAGNOSIS — L90.0 LICHEN SCLEROSUS: ICD-10-CM

## 2022-05-25 DIAGNOSIS — Z78.0 MENOPAUSE: ICD-10-CM

## 2022-05-25 DIAGNOSIS — M43.16 SPONDYLOLISTHESIS OF LUMBAR REGION: ICD-10-CM

## 2022-05-25 DIAGNOSIS — M72.2 PLANTAR FASCIITIS: ICD-10-CM

## 2022-05-25 DIAGNOSIS — E78.00 PURE HYPERCHOLESTEROLEMIA: Primary | ICD-10-CM

## 2022-05-25 DIAGNOSIS — M22.41 CHONDROMALACIA OF RIGHT PATELLA: ICD-10-CM

## 2022-05-25 DIAGNOSIS — M19.041 OSTEOARTHRITIS, HAND, PRIMARY LOCALIZED, RIGHT: ICD-10-CM

## 2022-05-25 DIAGNOSIS — I73.00 RAYNAUD'S DISEASE WITHOUT GANGRENE: ICD-10-CM

## 2022-05-25 DIAGNOSIS — M75.01 ADHESIVE CAPSULITIS OF RIGHT SHOULDER: ICD-10-CM

## 2022-05-25 DIAGNOSIS — M85.88 OSTEOPENIA OF SPINE: ICD-10-CM

## 2022-05-25 DIAGNOSIS — Z79.899 MEDICATION MANAGEMENT: ICD-10-CM

## 2022-05-25 DIAGNOSIS — J84.10 CALCIFIED GRANULOMA OF LUNG (HCC): ICD-10-CM

## 2022-05-25 DIAGNOSIS — Z79.899 ON STATIN THERAPY: ICD-10-CM

## 2022-05-25 DIAGNOSIS — B35.1 ONYCHOMYCOSIS: ICD-10-CM

## 2022-05-25 DIAGNOSIS — K58.1 IRRITABLE BOWEL SYNDROME WITH CONSTIPATION: ICD-10-CM

## 2022-05-25 DIAGNOSIS — Z00.00 ENCOUNTER FOR ANNUAL HEALTH EXAMINATION: ICD-10-CM

## 2022-05-25 DIAGNOSIS — Z01.419 WELL WOMAN EXAM WITH ROUTINE GYNECOLOGICAL EXAM: ICD-10-CM

## 2022-05-25 DIAGNOSIS — R71.8 ELEVATED MCV: ICD-10-CM

## 2022-05-25 DIAGNOSIS — Z12.31 ENCOUNTER FOR SCREENING MAMMOGRAM FOR MALIGNANT NEOPLASM OF BREAST: ICD-10-CM

## 2022-05-25 DIAGNOSIS — G47.00 INSOMNIA, UNSPECIFIED TYPE: ICD-10-CM

## 2022-05-25 DIAGNOSIS — E55.9 VITAMIN D DEFICIENCY: ICD-10-CM

## 2022-05-25 DIAGNOSIS — R93.1 ABNORMAL CT SCAN OF HEART: ICD-10-CM

## 2022-05-25 DIAGNOSIS — Z91.09 ENVIRONMENTAL ALLERGIES: ICD-10-CM

## 2022-05-25 DIAGNOSIS — H91.93 BILATERAL HEARING LOSS, UNSPECIFIED HEARING LOSS TYPE: ICD-10-CM

## 2022-05-25 PROCEDURE — G0439 PPPS, SUBSEQ VISIT: HCPCS | Performed by: FAMILY MEDICINE

## 2022-05-25 PROCEDURE — 99397 PER PM REEVAL EST PAT 65+ YR: CPT | Performed by: FAMILY MEDICINE

## 2022-05-25 PROCEDURE — 3074F SYST BP LT 130 MM HG: CPT | Performed by: FAMILY MEDICINE

## 2022-05-25 PROCEDURE — 3078F DIAST BP <80 MM HG: CPT | Performed by: FAMILY MEDICINE

## 2022-05-25 PROCEDURE — 88175 CYTOPATH C/V AUTO FLUID REDO: CPT | Performed by: FAMILY MEDICINE

## 2022-05-25 PROCEDURE — 96160 PT-FOCUSED HLTH RISK ASSMT: CPT | Performed by: FAMILY MEDICINE

## 2022-05-25 PROCEDURE — G0009 ADMIN PNEUMOCOCCAL VACCINE: HCPCS | Performed by: FAMILY MEDICINE

## 2022-05-25 PROCEDURE — 90677 PCV20 VACCINE IM: CPT | Performed by: FAMILY MEDICINE

## 2022-05-25 PROCEDURE — 3008F BODY MASS INDEX DOCD: CPT | Performed by: FAMILY MEDICINE

## 2022-05-31 LAB — HPV I/H RISK 1 DNA SPEC QL NAA+PROBE: NEGATIVE

## 2022-06-29 DIAGNOSIS — E78.00 PURE HYPERCHOLESTEROLEMIA: ICD-10-CM

## 2022-06-29 DIAGNOSIS — Z79.899 ON STATIN THERAPY: ICD-10-CM

## 2022-06-29 RX ORDER — ATORVASTATIN CALCIUM 10 MG/1
TABLET, FILM COATED ORAL
Qty: 90 TABLET | Refills: 0 | Status: SHIPPED | OUTPATIENT
Start: 2022-06-29

## 2022-08-04 ENCOUNTER — HOSPITAL ENCOUNTER (OUTPATIENT)
Dept: CT IMAGING | Age: 67
Discharge: HOME | End: 2022-08-04
Attending: INTERNAL MEDICINE
Payer: MEDICARE

## 2022-08-04 DIAGNOSIS — R91.8 MULTIPLE LUNG NODULES ON CT: ICD-10-CM

## 2022-08-04 PROCEDURE — 71250 CT THORAX DX C-: CPT | Performed by: INTERNAL MEDICINE

## 2022-08-05 ENCOUNTER — HOSPITAL ENCOUNTER (OUTPATIENT)
Dept: MAMMOGRAPHY | Age: 67
Discharge: HOME | End: 2022-08-05
Attending: FAMILY MEDICINE
Payer: MEDICARE

## 2022-08-05 DIAGNOSIS — Z12.31 ENCOUNTER FOR SCREENING MAMMOGRAM FOR MALIGNANT NEOPLASM OF BREAST: ICD-10-CM

## 2022-08-05 PROCEDURE — 77067 SCR MAMMO BI INCL CAD: CPT | Performed by: FAMILY MEDICINE

## 2022-08-05 PROCEDURE — 77063 BREAST TOMOSYNTHESIS BI: CPT | Performed by: FAMILY MEDICINE

## 2022-08-25 ENCOUNTER — OFFICE VISIT (OUTPATIENT)
Dept: FAMILY MEDICINE CLINIC | Facility: CLINIC | Age: 67
End: 2022-08-25
Payer: COMMERCIAL

## 2022-08-25 VITALS
RESPIRATION RATE: 16 BRPM | SYSTOLIC BLOOD PRESSURE: 120 MMHG | WEIGHT: 115 LBS | BODY MASS INDEX: 19.16 KG/M2 | HEART RATE: 66 BPM | TEMPERATURE: 98 F | DIASTOLIC BLOOD PRESSURE: 70 MMHG | HEIGHT: 65 IN | OXYGEN SATURATION: 99 %

## 2022-08-25 DIAGNOSIS — H61.21 IMPACTED CERUMEN OF RIGHT EAR: Primary | ICD-10-CM

## 2022-08-25 PROCEDURE — 69209 REMOVE IMPACTED EAR WAX UNI: CPT | Performed by: NURSE PRACTITIONER

## 2022-08-25 PROCEDURE — 3074F SYST BP LT 130 MM HG: CPT | Performed by: NURSE PRACTITIONER

## 2022-08-25 PROCEDURE — 3008F BODY MASS INDEX DOCD: CPT | Performed by: NURSE PRACTITIONER

## 2022-08-25 PROCEDURE — 3078F DIAST BP <80 MM HG: CPT | Performed by: NURSE PRACTITIONER

## 2022-08-25 RX ORDER — UBIDECARENONE 75 MG
250 CAPSULE ORAL DAILY
COMMUNITY

## 2022-08-26 ENCOUNTER — LAB ENCOUNTER (OUTPATIENT)
Dept: LAB | Age: 67
End: 2022-08-26
Attending: FAMILY MEDICINE
Payer: MEDICARE

## 2022-08-26 DIAGNOSIS — R71.8 ELEVATED MCV: ICD-10-CM

## 2022-08-26 LAB
BASOPHILS # BLD AUTO: 0.05 X10(3) UL (ref 0–0.2)
BASOPHILS NFR BLD AUTO: 1.3 %
EOSINOPHIL # BLD AUTO: 0.29 X10(3) UL (ref 0–0.7)
EOSINOPHIL NFR BLD AUTO: 7.3 %
ERYTHROCYTE [DISTWIDTH] IN BLOOD BY AUTOMATED COUNT: 12.3 %
HCT VFR BLD AUTO: 39.3 %
HGB BLD-MCNC: 12.9 G/DL
IMM GRANULOCYTES # BLD AUTO: 0.01 X10(3) UL (ref 0–1)
IMM GRANULOCYTES NFR BLD: 0.3 %
LYMPHOCYTES # BLD AUTO: 1.66 X10(3) UL (ref 1–4)
LYMPHOCYTES NFR BLD AUTO: 41.6 %
MCH RBC QN AUTO: 33.3 PG (ref 26–34)
MCHC RBC AUTO-ENTMCNC: 32.8 G/DL (ref 31–37)
MCV RBC AUTO: 101.6 FL
MONOCYTES # BLD AUTO: 0.33 X10(3) UL (ref 0.1–1)
MONOCYTES NFR BLD AUTO: 8.3 %
NEUTROPHILS # BLD AUTO: 1.65 X10 (3) UL (ref 1.5–7.7)
NEUTROPHILS # BLD AUTO: 1.65 X10(3) UL (ref 1.5–7.7)
NEUTROPHILS NFR BLD AUTO: 41.2 %
PLATELET # BLD AUTO: 211 10(3)UL (ref 150–450)
RBC # BLD AUTO: 3.87 X10(6)UL
VIT B12 SERPL-MCNC: 944 PG/ML (ref 193–986)
WBC # BLD AUTO: 4 X10(3) UL (ref 4–11)

## 2022-08-26 PROCEDURE — 36415 COLL VENOUS BLD VENIPUNCTURE: CPT

## 2022-08-26 PROCEDURE — 85025 COMPLETE CBC W/AUTO DIFF WBC: CPT

## 2022-08-26 PROCEDURE — 82607 VITAMIN B-12: CPT

## 2022-09-20 ENCOUNTER — ORDER TRANSCRIPTION (OUTPATIENT)
Dept: ADMINISTRATIVE | Facility: HOSPITAL | Age: 67
End: 2022-09-20

## 2022-09-20 ENCOUNTER — MED REC SCAN ONLY (OUTPATIENT)
Dept: FAMILY MEDICINE CLINIC | Facility: CLINIC | Age: 67
End: 2022-09-20

## 2022-09-20 DIAGNOSIS — Z11.59 ENCOUNTER FOR SCREENING FOR OTHER VIRAL DISEASES: ICD-10-CM

## 2022-09-20 DIAGNOSIS — Z01.818 PREOPERATIVE EXAMINATION, UNSPECIFIED: Primary | ICD-10-CM

## 2022-09-20 DIAGNOSIS — J84.9 INTERSTITIAL LUNG DISEASE (HCC): Primary | ICD-10-CM

## 2022-09-20 DIAGNOSIS — J47.9 BRONCHIECTASIS WITHOUT ACUTE EXACERBATION (HCC): ICD-10-CM

## 2022-09-20 DIAGNOSIS — R91.8 MULTIPLE NODULES OF LUNG: ICD-10-CM

## 2022-09-22 DIAGNOSIS — E78.00 PURE HYPERCHOLESTEROLEMIA: ICD-10-CM

## 2022-09-22 DIAGNOSIS — Z79.899 ON STATIN THERAPY: ICD-10-CM

## 2022-09-22 RX ORDER — ATORVASTATIN CALCIUM 10 MG/1
TABLET, FILM COATED ORAL
Qty: 90 TABLET | Refills: 0 | Status: SHIPPED | OUTPATIENT
Start: 2022-09-22

## 2022-09-22 RX ORDER — ATORVASTATIN CALCIUM 10 MG/1
TABLET, FILM COATED ORAL
Qty: 90 TABLET | Refills: 0 | Status: SHIPPED | OUTPATIENT
Start: 2022-09-22 | End: 2022-09-22

## 2022-09-23 ENCOUNTER — LAB ENCOUNTER (OUTPATIENT)
Dept: LAB | Age: 67
End: 2022-09-23
Attending: INTERNAL MEDICINE

## 2022-09-23 DIAGNOSIS — K86.9 PANCREATIC PLEURAL EFFUSION: Primary | ICD-10-CM

## 2022-09-23 DIAGNOSIS — R91.8 LUNG MASS: ICD-10-CM

## 2022-09-23 DIAGNOSIS — Z11.59 ENCOUNTER FOR SCREENING FOR OTHER VIRAL DISEASES: ICD-10-CM

## 2022-09-23 DIAGNOSIS — J84.9 TROPICAL PULMONARY ALVEOLITIS (HCC): ICD-10-CM

## 2022-09-23 DIAGNOSIS — Z01.818 PREOPERATIVE EXAMINATION, UNSPECIFIED: ICD-10-CM

## 2022-09-23 DIAGNOSIS — J91.8 PANCREATIC PLEURAL EFFUSION: Primary | ICD-10-CM

## 2022-09-23 LAB
BASOPHILS # BLD AUTO: 0.05 X10(3) UL (ref 0–0.2)
BASOPHILS NFR BLD AUTO: 0.7 %
EOSINOPHIL # BLD AUTO: 0.19 X10(3) UL (ref 0–0.7)
EOSINOPHIL NFR BLD AUTO: 2.5 %
ERYTHROCYTE [DISTWIDTH] IN BLOOD BY AUTOMATED COUNT: 12.2 %
HCT VFR BLD AUTO: 42.2 %
HGB BLD-MCNC: 14 G/DL
IGA SERPL-MCNC: 356 MG/DL (ref 70–312)
IGE SERPL-ACNC: 97.8 IU/ML (ref 3.6–114)
IMM GRANULOCYTES # BLD AUTO: 0.01 X10(3) UL (ref 0–1)
IMM GRANULOCYTES NFR BLD: 0.1 %
IMMUNOGLOBULIN PNL SER-MCNC: 1160 MG/DL (ref 791–1643)
LYMPHOCYTES # BLD AUTO: 3.03 X10(3) UL (ref 1–4)
LYMPHOCYTES NFR BLD AUTO: 39.7 %
MCH RBC QN AUTO: 33.7 PG (ref 26–34)
MCHC RBC AUTO-ENTMCNC: 33.2 G/DL (ref 31–37)
MCV RBC AUTO: 101.4 FL
MONOCYTES # BLD AUTO: 0.48 X10(3) UL (ref 0.1–1)
MONOCYTES NFR BLD AUTO: 6.3 %
NEUTROPHILS # BLD AUTO: 3.87 X10 (3) UL (ref 1.5–7.7)
NEUTROPHILS # BLD AUTO: 3.87 X10(3) UL (ref 1.5–7.7)
NEUTROPHILS NFR BLD AUTO: 50.7 %
PLATELET # BLD AUTO: 234 10(3)UL (ref 150–450)
RBC # BLD AUTO: 4.16 X10(6)UL
WBC # BLD AUTO: 7.6 X10(3) UL (ref 4–11)

## 2022-09-23 PROCEDURE — 82785 ASSAY OF IGE: CPT

## 2022-09-23 PROCEDURE — 83516 IMMUNOASSAY NONANTIBODY: CPT

## 2022-09-23 PROCEDURE — 85025 COMPLETE CBC W/AUTO DIFF WBC: CPT

## 2022-09-23 PROCEDURE — 82784 ASSAY IGA/IGD/IGG/IGM EACH: CPT

## 2022-09-23 PROCEDURE — 86036 ANCA SCREEN EACH ANTIBODY: CPT

## 2022-09-23 PROCEDURE — 86235 NUCLEAR ANTIGEN ANTIBODY: CPT

## 2022-09-23 PROCEDURE — 82164 ANGIOTENSIN I ENZYME TEST: CPT

## 2022-09-23 PROCEDURE — 36415 COLL VENOUS BLD VENIPUNCTURE: CPT

## 2022-09-24 LAB — SARS-COV-2 RNA RESP QL NAA+PROBE: NOT DETECTED

## 2022-09-25 LAB — ANGIOTENSIN CONVERTING ENZYME: 72 U/L

## 2022-09-26 ENCOUNTER — RT VISIT (OUTPATIENT)
Dept: RESPIRATORY THERAPY | Facility: HOSPITAL | Age: 67
End: 2022-09-26
Attending: INTERNAL MEDICINE

## 2022-09-26 DIAGNOSIS — R91.8 MULTIPLE NODULES OF LUNG: ICD-10-CM

## 2022-09-26 DIAGNOSIS — J47.9 BRONCHIECTASIS WITHOUT ACUTE EXACERBATION (HCC): ICD-10-CM

## 2022-09-26 DIAGNOSIS — J84.9 INTERSTITIAL LUNG DISEASE (HCC): ICD-10-CM

## 2022-09-26 PROCEDURE — 94729 DIFFUSING CAPACITY: CPT

## 2022-09-26 PROCEDURE — 94010 BREATHING CAPACITY TEST: CPT

## 2022-09-26 PROCEDURE — 94726 PLETHYSMOGRAPHY LUNG VOLUMES: CPT

## 2022-09-27 NOTE — PROCEDURES
Findings:  FEV1 is 1.91L, 83% predicted. FVC is 2.36L, 80% predicted. FEV1/ FVC ratio is 0.81. The flow-volume loop demonstrates a normal pattern. The TLC is 4.47L, 89% predicted. The residual volume 2.11L, 104% predicted. The diffusion capacity is 73% predicted and 96% predicted when corrected for alveolar volume. Impression:  There is no airway obstruction on spirometry and visualized on flow-volume loop. Lung volumes are normal.  Diffusion capacity is mildly reduced with DLCO of 73% but improves to normal when considering alveolar volume. This may represent a normal finding but can be seen in emphysema, interstitial lung disease, pulmonary vascular disease (such as pulmonary hypertension) and anemia. If not already performed, would suggest further evaluation as determined clinically. There are no  previous pulmonary function tests available for comparison.

## 2022-09-28 LAB
MYELOPEROX ANTIBODIES, IGG: 0 AU/ML
SERINE PROTEASE 3, IGG: 1 AU/ML

## 2023-01-25 ENCOUNTER — OFFICE VISIT (OUTPATIENT)
Dept: FAMILY MEDICINE CLINIC | Facility: CLINIC | Age: 68
End: 2023-01-25
Payer: COMMERCIAL

## 2023-01-25 VITALS
WEIGHT: 114 LBS | HEART RATE: 87 BPM | BODY MASS INDEX: 18.99 KG/M2 | RESPIRATION RATE: 16 BRPM | SYSTOLIC BLOOD PRESSURE: 102 MMHG | OXYGEN SATURATION: 98 % | DIASTOLIC BLOOD PRESSURE: 74 MMHG | HEIGHT: 65 IN

## 2023-01-25 DIAGNOSIS — Z00.00 LABORATORY EXAMINATION ORDERED AS PART OF A ROUTINE GENERAL MEDICAL EXAMINATION: ICD-10-CM

## 2023-01-25 DIAGNOSIS — Z13.89 SCREENING FOR GENITOURINARY CONDITION: ICD-10-CM

## 2023-01-25 DIAGNOSIS — E55.9 VITAMIN D DEFICIENCY: ICD-10-CM

## 2023-01-25 DIAGNOSIS — E78.00 PURE HYPERCHOLESTEROLEMIA: Primary | ICD-10-CM

## 2023-01-25 DIAGNOSIS — Z78.0 MENOPAUSE: ICD-10-CM

## 2023-01-25 DIAGNOSIS — M94.0 COSTOCHONDRITIS: ICD-10-CM

## 2023-01-25 PROCEDURE — 99213 OFFICE O/P EST LOW 20 MIN: CPT | Performed by: FAMILY MEDICINE

## 2023-01-25 PROCEDURE — 3074F SYST BP LT 130 MM HG: CPT | Performed by: FAMILY MEDICINE

## 2023-01-25 PROCEDURE — 1125F AMNT PAIN NOTED PAIN PRSNT: CPT | Performed by: FAMILY MEDICINE

## 2023-01-25 PROCEDURE — 3008F BODY MASS INDEX DOCD: CPT | Performed by: FAMILY MEDICINE

## 2023-01-25 PROCEDURE — 3078F DIAST BP <80 MM HG: CPT | Performed by: FAMILY MEDICINE

## 2023-01-25 RX ORDER — IBUPROFEN 600 MG/1
600 TABLET ORAL 2 TIMES DAILY
Qty: 30 TABLET | Refills: 1 | Status: SHIPPED | OUTPATIENT
Start: 2023-01-25

## 2023-03-12 DIAGNOSIS — Z79.899 ON STATIN THERAPY: ICD-10-CM

## 2023-03-12 DIAGNOSIS — E78.00 PURE HYPERCHOLESTEROLEMIA: ICD-10-CM

## 2023-03-13 RX ORDER — ATORVASTATIN CALCIUM 10 MG/1
TABLET, FILM COATED ORAL
Qty: 90 TABLET | Refills: 1 | Status: SHIPPED | OUTPATIENT
Start: 2023-03-13

## 2023-03-15 ENCOUNTER — OFFICE VISIT (OUTPATIENT)
Dept: FAMILY MEDICINE CLINIC | Facility: CLINIC | Age: 68
End: 2023-03-15
Payer: OTHER GOVERNMENT

## 2023-03-15 VITALS
DIASTOLIC BLOOD PRESSURE: 70 MMHG | WEIGHT: 118 LBS | HEIGHT: 65 IN | SYSTOLIC BLOOD PRESSURE: 138 MMHG | OXYGEN SATURATION: 100 % | HEART RATE: 67 BPM | RESPIRATION RATE: 17 BRPM | BODY MASS INDEX: 19.66 KG/M2

## 2023-03-15 DIAGNOSIS — M54.9 CHRONIC BACK PAIN, UNSPECIFIED BACK LOCATION, UNSPECIFIED BACK PAIN LATERALITY: ICD-10-CM

## 2023-03-15 DIAGNOSIS — M99.03 SOMATIC DYSFUNCTION OF SPINE, LUMBAR: ICD-10-CM

## 2023-03-15 DIAGNOSIS — G89.29 CHRONIC BACK PAIN, UNSPECIFIED BACK LOCATION, UNSPECIFIED BACK PAIN LATERALITY: ICD-10-CM

## 2023-03-15 DIAGNOSIS — M99.02 SOMATIC DYSFUNCTION OF SPINE, THORACIC: ICD-10-CM

## 2023-03-15 DIAGNOSIS — N90.4 LICHEN SCLEROSUS OF FEMALE GENITALIA: ICD-10-CM

## 2023-03-15 DIAGNOSIS — M99.08 SOMATIC DYSFUNCTION OF RIB: Primary | ICD-10-CM

## 2023-03-15 DIAGNOSIS — R29.3 POOR POSTURE: ICD-10-CM

## 2023-03-15 PROCEDURE — 99213 OFFICE O/P EST LOW 20 MIN: CPT | Performed by: FAMILY MEDICINE

## 2023-03-15 PROCEDURE — 98926 OSTEOPATH MANJ 3-4 REGIONS: CPT | Performed by: FAMILY MEDICINE

## 2023-03-15 PROCEDURE — 3075F SYST BP GE 130 - 139MM HG: CPT | Performed by: FAMILY MEDICINE

## 2023-03-15 PROCEDURE — 3008F BODY MASS INDEX DOCD: CPT | Performed by: FAMILY MEDICINE

## 2023-03-15 PROCEDURE — 3078F DIAST BP <80 MM HG: CPT | Performed by: FAMILY MEDICINE

## 2023-03-15 RX ORDER — CLOBETASOL PROPIONATE 0.5 MG/G
1 CREAM TOPICAL 2 TIMES DAILY PRN
Qty: 30 G | Refills: 1 | Status: SHIPPED | OUTPATIENT
Start: 2023-03-15

## 2023-03-20 ENCOUNTER — OFFICE VISIT (OUTPATIENT)
Facility: CLINIC | Age: 68
End: 2023-03-20
Payer: COMMERCIAL

## 2023-03-20 VITALS
HEART RATE: 71 BPM | RESPIRATION RATE: 16 BRPM | DIASTOLIC BLOOD PRESSURE: 64 MMHG | OXYGEN SATURATION: 97 % | SYSTOLIC BLOOD PRESSURE: 120 MMHG

## 2023-03-20 DIAGNOSIS — J84.9 ILD (INTERSTITIAL LUNG DISEASE) (HCC): Primary | ICD-10-CM

## 2023-03-20 DIAGNOSIS — R91.8 MULTIPLE PULMONARY NODULES: ICD-10-CM

## 2023-03-20 DIAGNOSIS — J47.9 BRONCHIECTASIS WITHOUT COMPLICATION (HCC): ICD-10-CM

## 2023-03-20 PROCEDURE — 3074F SYST BP LT 130 MM HG: CPT | Performed by: INTERNAL MEDICINE

## 2023-03-20 PROCEDURE — 3078F DIAST BP <80 MM HG: CPT | Performed by: INTERNAL MEDICINE

## 2023-03-20 PROCEDURE — 99214 OFFICE O/P EST MOD 30 MIN: CPT | Performed by: INTERNAL MEDICINE

## 2023-03-20 NOTE — PATIENT INSTRUCTIONS
Please obtain the blood work (YOLANDA or connective tissue disease screening test) with your next blood draws.   Please obtain a breathing test and CT scan in late August or September  See me in September 2023

## 2023-03-27 ENCOUNTER — HOSPITAL ENCOUNTER (OUTPATIENT)
Dept: BONE DENSITY | Age: 68
Discharge: HOME | End: 2023-03-27
Attending: FAMILY MEDICINE
Payer: MEDICARE

## 2023-03-27 DIAGNOSIS — Z78.0 MENOPAUSE: ICD-10-CM

## 2023-03-27 PROCEDURE — 77080 DXA BONE DENSITY AXIAL: CPT | Performed by: FAMILY MEDICINE

## 2023-03-27 NOTE — PROGRESS NOTES
Dear Crissy Salazar,    Your bone density/dexa shows mild osteopenia in your back. Focus on weight bearing exercises, adequate vitamin D and calcium intake. I recommend repeating your dexa in 2 years.     Sincerely,  Dr. Freeman Phoenix

## 2023-05-03 ENCOUNTER — LAB ENCOUNTER (OUTPATIENT)
Dept: LAB | Age: 68
End: 2023-05-03
Attending: FAMILY MEDICINE
Payer: MEDICARE

## 2023-05-03 DIAGNOSIS — J84.9 ILD (INTERSTITIAL LUNG DISEASE) (HCC): ICD-10-CM

## 2023-05-03 DIAGNOSIS — E78.00 PURE HYPERCHOLESTEROLEMIA: ICD-10-CM

## 2023-05-03 DIAGNOSIS — E55.9 VITAMIN D DEFICIENCY: ICD-10-CM

## 2023-05-03 DIAGNOSIS — Z00.00 LABORATORY EXAMINATION ORDERED AS PART OF A ROUTINE GENERAL MEDICAL EXAMINATION: ICD-10-CM

## 2023-05-03 DIAGNOSIS — Z13.89 SCREENING FOR GENITOURINARY CONDITION: ICD-10-CM

## 2023-05-03 LAB
ALBUMIN SERPL-MCNC: 3.8 G/DL (ref 3.4–5)
ALBUMIN/GLOB SERPL: 1.1 {RATIO} (ref 1–2)
ALP LIVER SERPL-CCNC: 77 U/L
ALT SERPL-CCNC: 20 U/L
ANION GAP SERPL CALC-SCNC: 1 MMOL/L (ref 0–18)
AST SERPL-CCNC: 34 U/L (ref 15–37)
BASOPHILS # BLD AUTO: 0.05 X10(3) UL (ref 0–0.2)
BASOPHILS NFR BLD AUTO: 1.2 %
BILIRUB SERPL-MCNC: 0.3 MG/DL (ref 0.1–2)
BILIRUB UR QL STRIP.AUTO: NEGATIVE
BUN BLD-MCNC: 16 MG/DL (ref 7–18)
CALCIUM BLD-MCNC: 9.4 MG/DL (ref 8.5–10.1)
CHLORIDE SERPL-SCNC: 107 MMOL/L (ref 98–112)
CHOLEST SERPL-MCNC: 164 MG/DL (ref ?–200)
CLARITY UR REFRACT.AUTO: CLEAR
CO2 SERPL-SCNC: 27 MMOL/L (ref 21–32)
COLOR UR AUTO: YELLOW
CREAT BLD-MCNC: 0.82 MG/DL
EOSINOPHIL # BLD AUTO: 0.26 X10(3) UL (ref 0–0.7)
EOSINOPHIL NFR BLD AUTO: 6.3 %
ERYTHROCYTE [DISTWIDTH] IN BLOOD BY AUTOMATED COUNT: 12.6 %
FASTING PATIENT LIPID ANSWER: YES
FASTING STATUS PATIENT QL REPORTED: YES
GFR SERPLBLD BASED ON 1.73 SQ M-ARVRAT: 78 ML/MIN/1.73M2 (ref 60–?)
GLOBULIN PLAS-MCNC: 3.5 G/DL (ref 2.8–4.4)
GLUCOSE BLD-MCNC: 95 MG/DL (ref 70–99)
GLUCOSE UR STRIP.AUTO-MCNC: NEGATIVE MG/DL
HCT VFR BLD AUTO: 38 %
HDLC SERPL-MCNC: 90 MG/DL (ref 40–59)
HGB BLD-MCNC: 12.6 G/DL
HYALINE CASTS #/AREA URNS AUTO: PRESENT /LPF
IMM GRANULOCYTES # BLD AUTO: 0.01 X10(3) UL (ref 0–1)
IMM GRANULOCYTES NFR BLD: 0.2 %
KETONES UR STRIP.AUTO-MCNC: NEGATIVE MG/DL
LDLC SERPL CALC-MCNC: 64 MG/DL (ref ?–100)
LYMPHOCYTES # BLD AUTO: 1.54 X10(3) UL (ref 1–4)
LYMPHOCYTES NFR BLD AUTO: 37 %
MCH RBC QN AUTO: 33.5 PG (ref 26–34)
MCHC RBC AUTO-ENTMCNC: 33.2 G/DL (ref 31–37)
MCV RBC AUTO: 101.1 FL
MONOCYTES # BLD AUTO: 0.36 X10(3) UL (ref 0.1–1)
MONOCYTES NFR BLD AUTO: 8.7 %
NEUTROPHILS # BLD AUTO: 1.94 X10 (3) UL (ref 1.5–7.7)
NEUTROPHILS # BLD AUTO: 1.94 X10(3) UL (ref 1.5–7.7)
NEUTROPHILS NFR BLD AUTO: 46.6 %
NITRITE UR QL STRIP.AUTO: NEGATIVE
NONHDLC SERPL-MCNC: 74 MG/DL (ref ?–130)
OSMOLALITY SERPL CALC.SUM OF ELEC: 281 MOSM/KG (ref 275–295)
PH UR STRIP.AUTO: 5 [PH] (ref 5–8)
PLATELET # BLD AUTO: 217 10(3)UL (ref 150–450)
POTASSIUM SERPL-SCNC: 4 MMOL/L (ref 3.5–5.1)
PROT SERPL-MCNC: 7.3 G/DL (ref 6.4–8.2)
PROT UR STRIP.AUTO-MCNC: NEGATIVE MG/DL
RBC # BLD AUTO: 3.76 X10(6)UL
SODIUM SERPL-SCNC: 135 MMOL/L (ref 136–145)
SP GR UR STRIP.AUTO: 1.02 (ref 1–1.03)
TRIGL SERPL-MCNC: 46 MG/DL (ref 30–149)
TSI SER-ACNC: 2.77 MIU/ML (ref 0.36–3.74)
UROBILINOGEN UR STRIP.AUTO-MCNC: <2 MG/DL
VIT D+METAB SERPL-MCNC: 48.6 NG/ML (ref 30–100)
VLDLC SERPL CALC-MCNC: 7 MG/DL (ref 0–30)
WBC # BLD AUTO: 4.2 X10(3) UL (ref 4–11)

## 2023-05-03 PROCEDURE — 85025 COMPLETE CBC W/AUTO DIFF WBC: CPT

## 2023-05-03 PROCEDURE — 81001 URINALYSIS AUTO W/SCOPE: CPT

## 2023-05-03 PROCEDURE — 80053 COMPREHEN METABOLIC PANEL: CPT

## 2023-05-03 PROCEDURE — 84443 ASSAY THYROID STIM HORMONE: CPT

## 2023-05-03 PROCEDURE — 86038 ANTINUCLEAR ANTIBODIES: CPT

## 2023-05-03 PROCEDURE — 80061 LIPID PANEL: CPT

## 2023-05-03 PROCEDURE — 86225 DNA ANTIBODY NATIVE: CPT

## 2023-05-03 PROCEDURE — 82306 VITAMIN D 25 HYDROXY: CPT

## 2023-05-04 LAB
DSDNA IGG SERPL IA-ACNC: 2.1 IU/ML
ENA AB SER QL IA: 0.2 UG/L
ENA AB SER QL IA: NEGATIVE

## 2023-05-05 ENCOUNTER — OFFICE VISIT (OUTPATIENT)
Dept: FAMILY MEDICINE CLINIC | Facility: CLINIC | Age: 68
End: 2023-05-05
Payer: COMMERCIAL

## 2023-05-05 VITALS
DIASTOLIC BLOOD PRESSURE: 66 MMHG | BODY MASS INDEX: 19.28 KG/M2 | HEART RATE: 78 BPM | OXYGEN SATURATION: 100 % | WEIGHT: 115.75 LBS | RESPIRATION RATE: 16 BRPM | HEIGHT: 65 IN | SYSTOLIC BLOOD PRESSURE: 104 MMHG

## 2023-05-05 DIAGNOSIS — M99.03 SOMATIC DYSFUNCTION OF SPINE, LUMBAR: ICD-10-CM

## 2023-05-05 DIAGNOSIS — G89.29 CHRONIC BACK PAIN, UNSPECIFIED BACK LOCATION, UNSPECIFIED BACK PAIN LATERALITY: ICD-10-CM

## 2023-05-05 DIAGNOSIS — M94.0 COSTOCHONDRITIS: ICD-10-CM

## 2023-05-05 DIAGNOSIS — M79.10 TRIGGER POINT: Primary | ICD-10-CM

## 2023-05-05 DIAGNOSIS — M99.02 SOMATIC DYSFUNCTION OF SPINE, THORACIC: ICD-10-CM

## 2023-05-05 DIAGNOSIS — M54.9 CHRONIC BACK PAIN, UNSPECIFIED BACK LOCATION, UNSPECIFIED BACK PAIN LATERALITY: ICD-10-CM

## 2023-05-05 RX ORDER — TRIAMCINOLONE ACETONIDE 40 MG/ML
10 INJECTION, SUSPENSION INTRA-ARTICULAR; INTRAMUSCULAR ONCE
Status: COMPLETED | OUTPATIENT
Start: 2023-05-05 | End: 2023-05-05

## 2023-05-05 RX ORDER — LIDOCAINE HYDROCHLORIDE 20 MG/ML
3.75 INJECTION, SOLUTION INFILTRATION; PERINEURAL ONCE
Status: COMPLETED | OUTPATIENT
Start: 2023-05-05 | End: 2023-05-05

## 2023-05-05 RX ADMIN — LIDOCAINE HYDROCHLORIDE 3.8 ML: 20 INJECTION, SOLUTION INFILTRATION; PERINEURAL at 10:48:00

## 2023-05-05 RX ADMIN — TRIAMCINOLONE ACETONIDE 10 MG: 40 INJECTION, SUSPENSION INTRA-ARTICULAR; INTRAMUSCULAR at 10:49:00

## 2023-05-12 ENCOUNTER — TELEPHONE (OUTPATIENT)
Facility: CLINIC | Age: 68
End: 2023-05-12

## 2023-05-12 NOTE — TELEPHONE ENCOUNTER
Called/reviewed results with patient with negative YOLANDA testing. She denies acute issues today. No new concerns.     Plan for f/u CT and PFTs in August and f/u with me afterward    Donovan Concepcion MD

## 2023-05-23 ENCOUNTER — OFFICE VISIT (OUTPATIENT)
Dept: FAMILY MEDICINE CLINIC | Facility: CLINIC | Age: 68
End: 2023-05-23
Payer: COMMERCIAL

## 2023-05-23 VITALS
BODY MASS INDEX: 19.06 KG/M2 | DIASTOLIC BLOOD PRESSURE: 66 MMHG | SYSTOLIC BLOOD PRESSURE: 108 MMHG | RESPIRATION RATE: 16 BRPM | HEART RATE: 79 BPM | OXYGEN SATURATION: 98 % | HEIGHT: 64.5 IN | WEIGHT: 113 LBS

## 2023-05-23 DIAGNOSIS — M43.16 SPONDYLOLISTHESIS OF LUMBAR REGION: ICD-10-CM

## 2023-05-23 DIAGNOSIS — E78.00 PURE HYPERCHOLESTEROLEMIA: ICD-10-CM

## 2023-05-23 DIAGNOSIS — J84.9 INTERSTITIAL LUNG DISEASE (HCC): ICD-10-CM

## 2023-05-23 DIAGNOSIS — Z12.4 SCREENING FOR MALIGNANT NEOPLASM OF CERVIX: ICD-10-CM

## 2023-05-23 DIAGNOSIS — N90.4 LICHEN SCLEROSUS OF FEMALE GENITALIA: ICD-10-CM

## 2023-05-23 DIAGNOSIS — Z71.85 VACCINE COUNSELING: ICD-10-CM

## 2023-05-23 DIAGNOSIS — M85.88 OSTEOPENIA OF SPINE: ICD-10-CM

## 2023-05-23 DIAGNOSIS — I73.00 RAYNAUD'S DISEASE WITHOUT GANGRENE: ICD-10-CM

## 2023-05-23 DIAGNOSIS — Z12.11 SCREENING FOR MALIGNANT NEOPLASM OF COLON: ICD-10-CM

## 2023-05-23 DIAGNOSIS — R71.8 ELEVATED MCV: ICD-10-CM

## 2023-05-23 DIAGNOSIS — Z78.0 MENOPAUSE: ICD-10-CM

## 2023-05-23 DIAGNOSIS — R91.8 MULTIPLE NODULES OF LUNG: ICD-10-CM

## 2023-05-23 DIAGNOSIS — G47.00 INSOMNIA, UNSPECIFIED TYPE: ICD-10-CM

## 2023-05-23 DIAGNOSIS — Z12.31 SCREENING MAMMOGRAM FOR BREAST CANCER: ICD-10-CM

## 2023-05-23 DIAGNOSIS — M48.02 SPINAL STENOSIS OF CERVICAL REGION: ICD-10-CM

## 2023-05-23 DIAGNOSIS — E55.9 VITAMIN D DEFICIENCY: ICD-10-CM

## 2023-05-23 DIAGNOSIS — M94.0 COSTOCHONDRITIS: ICD-10-CM

## 2023-05-23 DIAGNOSIS — K58.1 IRRITABLE BOWEL SYNDROME WITH CONSTIPATION: ICD-10-CM

## 2023-05-23 DIAGNOSIS — Z79.899 ON STATIN THERAPY: ICD-10-CM

## 2023-05-23 DIAGNOSIS — Z00.00 ENCOUNTER FOR ANNUAL HEALTH EXAMINATION: Primary | ICD-10-CM

## 2023-05-23 DIAGNOSIS — M22.41 CHONDROMALACIA OF RIGHT PATELLA: ICD-10-CM

## 2023-05-23 DIAGNOSIS — Z79.899 MEDICATION MANAGEMENT: ICD-10-CM

## 2023-05-23 DIAGNOSIS — Z91.09 ENVIRONMENTAL ALLERGIES: ICD-10-CM

## 2023-05-23 DIAGNOSIS — H91.93 BILATERAL HEARING LOSS, UNSPECIFIED HEARING LOSS TYPE: ICD-10-CM

## 2023-05-23 PROCEDURE — 3008F BODY MASS INDEX DOCD: CPT | Performed by: FAMILY MEDICINE

## 2023-05-23 PROCEDURE — 1159F MED LIST DOCD IN RCRD: CPT | Performed by: FAMILY MEDICINE

## 2023-05-23 PROCEDURE — 3074F SYST BP LT 130 MM HG: CPT | Performed by: FAMILY MEDICINE

## 2023-05-23 PROCEDURE — 3078F DIAST BP <80 MM HG: CPT | Performed by: FAMILY MEDICINE

## 2023-05-23 PROCEDURE — G0439 PPPS, SUBSEQ VISIT: HCPCS | Performed by: FAMILY MEDICINE

## 2023-05-23 PROCEDURE — 99213 OFFICE O/P EST LOW 20 MIN: CPT | Performed by: FAMILY MEDICINE

## 2023-05-23 PROCEDURE — 1160F RVW MEDS BY RX/DR IN RCRD: CPT | Performed by: FAMILY MEDICINE

## 2023-05-23 PROCEDURE — 96160 PT-FOCUSED HLTH RISK ASSMT: CPT | Performed by: FAMILY MEDICINE

## 2023-05-23 PROCEDURE — 88175 CYTOPATH C/V AUTO FLUID REDO: CPT | Performed by: FAMILY MEDICINE

## 2023-05-23 PROCEDURE — 1125F AMNT PAIN NOTED PAIN PRSNT: CPT | Performed by: FAMILY MEDICINE

## 2023-05-23 PROCEDURE — 1170F FXNL STATUS ASSESSED: CPT | Performed by: FAMILY MEDICINE

## 2023-05-30 ENCOUNTER — TELEPHONE (OUTPATIENT)
Dept: FAMILY MEDICINE CLINIC | Facility: CLINIC | Age: 68
End: 2023-05-30

## 2023-05-30 NOTE — TELEPHONE ENCOUNTER
Start Diclofenac 75 mg BID #28 for 10-14 days with food if has no side effects with NSAIDs  Follow up 6/13 at 4 pm.

## 2023-05-30 NOTE — TELEPHONE ENCOUNTER
Dr. Norman Lynne,    [facility] 5-23-23  [address] 11-27-23  Asking to be seen next month for costocondritis since she cannot see Dr. Valentina Georges until August.  Not available for several weeks in July. Here are available appts in June:    6/9 2:30p  6/12 4p,  6/13 4p  6/14 4p  6/19 4:30p  6/20 4:30p  6:26 4p  6/27 4p  6/28 3p  Thank you.

## 2023-05-30 NOTE — TELEPHONE ENCOUNTER
Pt is requesting to be seen in June for:    Costochondritis    The specialist is not able to see pt until August. Pt is asking if she can be seen before she leaves on vacation in July. Pt is unavailable from 7/12-7/30.      Thanks

## 2023-05-31 RX ORDER — DICLOFENAC SODIUM 75 MG/1
75 TABLET, DELAYED RELEASE ORAL 2 TIMES DAILY
Qty: 28 TABLET | Refills: 0 | Status: SHIPPED | OUTPATIENT
Start: 2023-05-31

## 2023-06-13 ENCOUNTER — OFFICE VISIT (OUTPATIENT)
Dept: FAMILY MEDICINE CLINIC | Facility: CLINIC | Age: 68
End: 2023-06-13
Payer: OTHER GOVERNMENT

## 2023-06-13 VITALS
OXYGEN SATURATION: 99 % | SYSTOLIC BLOOD PRESSURE: 112 MMHG | HEART RATE: 67 BPM | WEIGHT: 113 LBS | DIASTOLIC BLOOD PRESSURE: 68 MMHG | BODY MASS INDEX: 19.06 KG/M2 | RESPIRATION RATE: 16 BRPM | HEIGHT: 64.5 IN

## 2023-06-13 DIAGNOSIS — M99.02 SOMATIC DYSFUNCTION OF SPINE, THORACIC: ICD-10-CM

## 2023-06-13 DIAGNOSIS — M99.08 SOMATIC DYSFUNCTION OF RIB: ICD-10-CM

## 2023-06-13 DIAGNOSIS — M99.03 SOMATIC DYSFUNCTION OF SPINE, LUMBAR: ICD-10-CM

## 2023-06-13 DIAGNOSIS — K21.9 GASTROESOPHAGEAL REFLUX DISEASE, UNSPECIFIED WHETHER ESOPHAGITIS PRESENT: Primary | ICD-10-CM

## 2023-06-13 PROCEDURE — 99213 OFFICE O/P EST LOW 20 MIN: CPT | Performed by: FAMILY MEDICINE

## 2023-06-13 PROCEDURE — 1160F RVW MEDS BY RX/DR IN RCRD: CPT | Performed by: FAMILY MEDICINE

## 2023-06-13 PROCEDURE — 3008F BODY MASS INDEX DOCD: CPT | Performed by: FAMILY MEDICINE

## 2023-06-13 PROCEDURE — 1170F FXNL STATUS ASSESSED: CPT | Performed by: FAMILY MEDICINE

## 2023-06-13 PROCEDURE — 98926 OSTEOPATH MANJ 3-4 REGIONS: CPT | Performed by: FAMILY MEDICINE

## 2023-06-13 PROCEDURE — 1159F MED LIST DOCD IN RCRD: CPT | Performed by: FAMILY MEDICINE

## 2023-06-13 PROCEDURE — 3078F DIAST BP <80 MM HG: CPT | Performed by: FAMILY MEDICINE

## 2023-06-13 PROCEDURE — 3074F SYST BP LT 130 MM HG: CPT | Performed by: FAMILY MEDICINE

## 2023-06-13 RX ORDER — OMEPRAZOLE 40 MG/1
40 CAPSULE, DELAYED RELEASE ORAL DAILY
Qty: 30 CAPSULE | Refills: 2 | Status: SHIPPED | OUTPATIENT
Start: 2023-06-13

## 2023-07-10 ENCOUNTER — OFFICE VISIT (OUTPATIENT)
Dept: FAMILY MEDICINE CLINIC | Facility: CLINIC | Age: 68
End: 2023-07-10
Payer: OTHER GOVERNMENT

## 2023-07-10 VITALS
SYSTOLIC BLOOD PRESSURE: 112 MMHG | BODY MASS INDEX: 19.39 KG/M2 | RESPIRATION RATE: 16 BRPM | HEIGHT: 64.5 IN | DIASTOLIC BLOOD PRESSURE: 64 MMHG | HEART RATE: 77 BPM | OXYGEN SATURATION: 98 % | WEIGHT: 115 LBS

## 2023-07-10 DIAGNOSIS — M99.08 SOMATIC DYSFUNCTION OF RIB: ICD-10-CM

## 2023-07-10 DIAGNOSIS — M99.02 SOMATIC DYSFUNCTION OF SPINE, THORACIC: Primary | ICD-10-CM

## 2023-07-10 DIAGNOSIS — M99.03 SOMATIC DYSFUNCTION OF SPINE, LUMBAR: ICD-10-CM

## 2023-07-10 PROCEDURE — 3008F BODY MASS INDEX DOCD: CPT | Performed by: FAMILY MEDICINE

## 2023-07-10 PROCEDURE — 1159F MED LIST DOCD IN RCRD: CPT | Performed by: FAMILY MEDICINE

## 2023-07-10 PROCEDURE — 3074F SYST BP LT 130 MM HG: CPT | Performed by: FAMILY MEDICINE

## 2023-07-10 PROCEDURE — 3078F DIAST BP <80 MM HG: CPT | Performed by: FAMILY MEDICINE

## 2023-07-10 PROCEDURE — 98926 OSTEOPATH MANJ 3-4 REGIONS: CPT | Performed by: FAMILY MEDICINE

## 2023-07-10 PROCEDURE — 1170F FXNL STATUS ASSESSED: CPT | Performed by: FAMILY MEDICINE

## 2023-07-10 PROCEDURE — 1160F RVW MEDS BY RX/DR IN RCRD: CPT | Performed by: FAMILY MEDICINE

## 2023-08-07 ENCOUNTER — OFFICE VISIT (OUTPATIENT)
Dept: FAMILY MEDICINE CLINIC | Facility: CLINIC | Age: 68
End: 2023-08-07
Payer: COMMERCIAL

## 2023-08-07 ENCOUNTER — TELEPHONE (OUTPATIENT)
Dept: FAMILY MEDICINE CLINIC | Facility: CLINIC | Age: 68
End: 2023-08-07

## 2023-08-07 ENCOUNTER — RT VISIT (OUTPATIENT)
Dept: RESPIRATORY THERAPY | Facility: HOSPITAL | Age: 68
End: 2023-08-07
Attending: INTERNAL MEDICINE
Payer: MEDICARE

## 2023-08-07 VITALS
RESPIRATION RATE: 18 BRPM | BODY MASS INDEX: 19.39 KG/M2 | HEIGHT: 64.5 IN | OXYGEN SATURATION: 99 % | WEIGHT: 115 LBS | SYSTOLIC BLOOD PRESSURE: 114 MMHG | DIASTOLIC BLOOD PRESSURE: 64 MMHG | HEART RATE: 78 BPM

## 2023-08-07 DIAGNOSIS — M99.02 SOMATIC DYSFUNCTION OF SPINE, THORACIC: ICD-10-CM

## 2023-08-07 DIAGNOSIS — M99.08 SOMATIC DYSFUNCTION OF RIB: ICD-10-CM

## 2023-08-07 DIAGNOSIS — M40.203 KYPHOSIS OF CERVICOTHORACIC REGION, UNSPECIFIED KYPHOSIS TYPE: ICD-10-CM

## 2023-08-07 DIAGNOSIS — M99.04 SOMATIC DYSFUNCTION OF SPINE, SACRAL: ICD-10-CM

## 2023-08-07 DIAGNOSIS — G89.29 CHRONIC BILATERAL BACK PAIN, UNSPECIFIED BACK LOCATION: ICD-10-CM

## 2023-08-07 DIAGNOSIS — M99.01 SOMATIC DYSFUNCTION OF SPINE, CERVICAL: ICD-10-CM

## 2023-08-07 DIAGNOSIS — M54.9 CHRONIC BILATERAL BACK PAIN, UNSPECIFIED BACK LOCATION: ICD-10-CM

## 2023-08-07 DIAGNOSIS — J84.9 ILD (INTERSTITIAL LUNG DISEASE) (HCC): ICD-10-CM

## 2023-08-07 DIAGNOSIS — M99.08 SOMATIC DYSFUNCTION OF CHEST WALL: ICD-10-CM

## 2023-08-07 DIAGNOSIS — M99.03 SOMATIC DYSFUNCTION OF SPINE, LUMBAR: ICD-10-CM

## 2023-08-07 DIAGNOSIS — R07.89 CHEST WALL PAIN: Primary | ICD-10-CM

## 2023-08-07 DIAGNOSIS — M21.70 ACQUIRED SHORT LEG SYNDROME ON LEFT: ICD-10-CM

## 2023-08-07 PROCEDURE — 94729 DIFFUSING CAPACITY: CPT

## 2023-08-07 PROCEDURE — 94060 EVALUATION OF WHEEZING: CPT

## 2023-08-07 PROCEDURE — 94726 PLETHYSMOGRAPHY LUNG VOLUMES: CPT

## 2023-08-08 ENCOUNTER — LAB ENCOUNTER (OUTPATIENT)
Dept: LAB | Age: 68
End: 2023-08-08
Attending: FAMILY MEDICINE
Payer: MEDICARE

## 2023-08-08 DIAGNOSIS — M54.9 DORSALGIA: ICD-10-CM

## 2023-08-08 DIAGNOSIS — G89.29 CHRONIC BILATERAL BACK PAIN, UNSPECIFIED BACK LOCATION: ICD-10-CM

## 2023-08-08 DIAGNOSIS — R07.89 CHEST WALL PAIN: ICD-10-CM

## 2023-08-08 DIAGNOSIS — G89.29 OTHER CHRONIC PAIN: ICD-10-CM

## 2023-08-08 DIAGNOSIS — R07.89 OTHER CHEST PAIN: Primary | ICD-10-CM

## 2023-08-08 DIAGNOSIS — M54.9 CHRONIC BILATERAL BACK PAIN, UNSPECIFIED BACK LOCATION: ICD-10-CM

## 2023-08-08 LAB
CRP SERPL-MCNC: <0.29 MG/DL (ref ?–0.3)
ERYTHROCYTE [SEDIMENTATION RATE] IN BLOOD: 13 MM/HR
RHEUMATOID FACT SERPL-ACNC: <10 IU/ML (ref ?–15)
URATE SERPL-MCNC: 5.9 MG/DL

## 2023-08-08 PROCEDURE — 86140 C-REACTIVE PROTEIN: CPT

## 2023-08-08 PROCEDURE — 86200 CCP ANTIBODY: CPT

## 2023-08-08 PROCEDURE — 86431 RHEUMATOID FACTOR QUANT: CPT

## 2023-08-08 PROCEDURE — 86225 DNA ANTIBODY NATIVE: CPT

## 2023-08-08 PROCEDURE — 84550 ASSAY OF BLOOD/URIC ACID: CPT

## 2023-08-08 PROCEDURE — 86038 ANTINUCLEAR ANTIBODIES: CPT

## 2023-08-08 PROCEDURE — 85652 RBC SED RATE AUTOMATED: CPT

## 2023-08-08 NOTE — PROCEDURES
Patient is a 59-year-old female being assessed with a diagnosis of interstitial lung disease. Results  FEV1 1.47 L mildly reduced 65% of predicted  FVC 2.33 L minimally reduced 79% of predicted  Ratio is reduced 63% of predicted  There is no significant improvement in FEV1 following the administration of bronchodilators  MVV is preserved at 105% of predicted  Flow volume loop with an obstructive pattern. Total lung capacity 4.39 L normal at 87% of predicted  Residual volume 2.06 L normal at 100% of predicted  Diffusion capacity is mildly reduced 72% of predicted though corrects to 98% of predicted when alveolar volume is considered. Impression mild fixed airways obstruction. Lung volumes are normal-diffusion capacity is minimally reduced though corrects to well within the normal range which can be a durable finding however clinical correlation to rule out a component of evolving pulmonary vascular disease/ILD/component anemia. When compared to a study from 9/26/2022---FEV1 is decreased approximately 500 cc and now has an obstructive ratio(FEV1/FVC ratio 81% prior) lung volumes remain unchanged as does diffusion capacity.

## 2023-08-09 ENCOUNTER — HOSPITAL ENCOUNTER (OUTPATIENT)
Dept: MAMMOGRAPHY | Age: 68
Discharge: HOME | End: 2023-08-09
Attending: FAMILY MEDICINE
Payer: MEDICARE

## 2023-08-09 ENCOUNTER — HOSPITAL ENCOUNTER (OUTPATIENT)
Dept: GENERAL RADIOLOGY | Age: 68
Discharge: HOME | End: 2023-08-09
Attending: FAMILY MEDICINE
Payer: MEDICARE

## 2023-08-09 DIAGNOSIS — Z12.31 SCREENING MAMMOGRAM FOR BREAST CANCER: ICD-10-CM

## 2023-08-09 DIAGNOSIS — R07.89 CHEST WALL PAIN: ICD-10-CM

## 2023-08-09 LAB
CCP IGG SERPL-ACNC: 1.8 U/ML (ref 0–6.9)
DSDNA IGG SERPL IA-ACNC: 1.8 IU/ML
ENA AB SER QL IA: 0.1 UG/L
ENA AB SER QL IA: NEGATIVE

## 2023-08-09 PROCEDURE — 77067 SCR MAMMO BI INCL CAD: CPT | Performed by: FAMILY MEDICINE

## 2023-08-09 PROCEDURE — 77063 BREAST TOMOSYNTHESIS BI: CPT | Performed by: FAMILY MEDICINE

## 2023-08-09 PROCEDURE — 71110 X-RAY EXAM RIBS BIL 3 VIEWS: CPT | Performed by: FAMILY MEDICINE

## 2023-08-09 NOTE — PROGRESS NOTES
Dear Shyam [place],    Your blood work is within normal limits.     Sincerely,  Dr. Latanya Gonzalez

## 2023-08-16 ENCOUNTER — TELEPHONE (OUTPATIENT)
Dept: PHYSICAL THERAPY | Facility: HOSPITAL | Age: 68
End: 2023-08-16

## 2023-08-22 ENCOUNTER — HOSPITAL ENCOUNTER (OUTPATIENT)
Dept: CT IMAGING | Age: 68
Discharge: HOME | End: 2023-08-22
Attending: INTERNAL MEDICINE
Payer: MEDICARE

## 2023-08-22 ENCOUNTER — TELEPHONE (OUTPATIENT)
Dept: PHYSICAL THERAPY | Facility: HOSPITAL | Age: 68
End: 2023-08-22

## 2023-08-22 DIAGNOSIS — J84.9 ILD (INTERSTITIAL LUNG DISEASE) (HCC): ICD-10-CM

## 2023-08-22 PROCEDURE — 71250 CT THORAX DX C-: CPT | Performed by: INTERNAL MEDICINE

## 2023-08-23 ENCOUNTER — TELEPHONE (OUTPATIENT)
Dept: PHYSICAL THERAPY | Facility: HOSPITAL | Age: 68
End: 2023-08-23

## 2023-08-28 ENCOUNTER — OFFICE VISIT (OUTPATIENT)
Dept: PHYSICAL THERAPY | Age: 68
End: 2023-08-28
Attending: FAMILY MEDICINE
Payer: MEDICARE

## 2023-08-28 DIAGNOSIS — M54.9 CHRONIC BILATERAL BACK PAIN, UNSPECIFIED BACK LOCATION: ICD-10-CM

## 2023-08-28 DIAGNOSIS — R07.89 CHEST WALL PAIN: Primary | ICD-10-CM

## 2023-08-28 DIAGNOSIS — G89.29 CHRONIC BILATERAL BACK PAIN, UNSPECIFIED BACK LOCATION: ICD-10-CM

## 2023-08-28 DIAGNOSIS — M40.203 KYPHOSIS OF CERVICOTHORACIC REGION, UNSPECIFIED KYPHOSIS TYPE: ICD-10-CM

## 2023-08-28 DIAGNOSIS — M21.70 ACQUIRED SHORT LEG SYNDROME ON LEFT: ICD-10-CM

## 2023-08-28 PROCEDURE — 97140 MANUAL THERAPY 1/> REGIONS: CPT

## 2023-08-28 PROCEDURE — 97162 PT EVAL MOD COMPLEX 30 MIN: CPT

## 2023-09-01 DIAGNOSIS — Z79.899 ON STATIN THERAPY: ICD-10-CM

## 2023-09-01 DIAGNOSIS — E78.00 PURE HYPERCHOLESTEROLEMIA: ICD-10-CM

## 2023-09-01 RX ORDER — ATORVASTATIN CALCIUM 10 MG/1
TABLET, FILM COATED ORAL
Qty: 90 TABLET | Refills: 1 | Status: SHIPPED | OUTPATIENT
Start: 2023-09-01

## 2023-09-06 ENCOUNTER — TELEMEDICINE (OUTPATIENT)
Facility: CLINIC | Age: 68
End: 2023-09-06
Payer: COMMERCIAL

## 2023-09-06 ENCOUNTER — OFFICE VISIT (OUTPATIENT)
Dept: PHYSICAL THERAPY | Age: 68
End: 2023-09-06
Attending: FAMILY MEDICINE
Payer: MEDICARE

## 2023-09-06 DIAGNOSIS — J84.9 ILD (INTERSTITIAL LUNG DISEASE) (HCC): Primary | ICD-10-CM

## 2023-09-06 PROCEDURE — 97110 THERAPEUTIC EXERCISES: CPT

## 2023-09-06 PROCEDURE — 1160F RVW MEDS BY RX/DR IN RCRD: CPT | Performed by: INTERNAL MEDICINE

## 2023-09-06 PROCEDURE — 99214 OFFICE O/P EST MOD 30 MIN: CPT | Performed by: INTERNAL MEDICINE

## 2023-09-06 PROCEDURE — 97140 MANUAL THERAPY 1/> REGIONS: CPT

## 2023-09-06 PROCEDURE — 1159F MED LIST DOCD IN RCRD: CPT | Performed by: INTERNAL MEDICINE

## 2023-09-06 NOTE — PROGRESS NOTES
Diagnosis:   Chest wall pain (R07.89)  Acquired short leg syndrome on left (M21.70)  Chronic bilateral back pain, unspecified back location (M54.9,G89.29)  Kyphosis of cervicothoracic region, unspecified kyphosis type (V95.596)       Referring Provider: Alfreda Wing  Date of Evaluation:    8/28/2023    Precautions:  Drug Allergy Next MD visit:   none scheduled  Date of Surgery: n/a   Insurance Primary/Secondary: [employer] / [facility]     # Auth Visits: 8            Subjective: The soreness at the front of my chest and between the shoulder blades varies from very little to very sore. Pain: 6-7/10 soreness around mid chest, shoulder blades      Objective: Tightness of pec minor, fwd position R shoulder  Limited overhead shoulder flexion with tightness lat and thoracic spine    Assessment: Pt remains fearful, she has been experiencing pain for the past 9 months varying in intensity with no specific pattern just good and bad days. She feels relief with heat, she walks 3 miles per day on her treadmill @ 4 mph, she works out with 8# dumbbells curls, overhead press, dead lift. Plan to assess response the PT tx today and change HEP accordingly. Goals:   (to be met in 8 visits)   Decrease tenderness upper trapezius and levator scapulae to enable pt to reach and lift with less discomfort  Decrease mid thoracic spine tenderness to enable pt to sit and lay supine comfortably  Pt will be able to perform regular housework duties without limitation due to neck/back pain    Plan: Continue PT for thoracic spine mobilizations, [facility]  Date: 9/6/2023  TX#: 2/8 Date:                 TX#: 3/ Date:                 TX#: 4/ Date:                 TX#: 5/ Date:    Tx#: 6/   Standing wall slide sh flexion x 10  Mini wall push ups x 10  Back against wall alternating sh flexion overhead R/L x 5  B sh horizontal abd @ 90 deg against wall x 10       UBE level 2 4 mins 2' fwd, 2' bwd       Supine PROM sh flexion, abduction, ER, horizontal abduction R/L x 10  Red t band chest pulls x 10  S lying scapular mobilizations Gr 3 2 mins each R/L  S lying trunk rotation with UE reach R/L x 10       Supine GH post glide, pec stretch 30 secs       Prone lying PA mobilizations Gr 2-3 T1-8 30 secs each level  STM thoracic paraspinal muscles 5 mins  Prone scapular squeezes 2 x 10  Prone upper back ext x 10       HEP: Red t band chest pulls, sh flexion slide on wall.      Charges: EX 2, MT 1       Total Timed Treatment: There ex (30 mins), Man (15 mins)  Total Treatment Time: 45 min

## 2023-09-06 NOTE — PROGRESS NOTES
[facility] General Pulmonary Progress Note    History of Present Illness:  Helen Barrientos is a 75year old female never smoker with PMH raynauds who was seen today for follow up of possible ILD and bronchiectasis. She denies acute concerns. She does c/o ongoing chest pain with palpation; has been ongoing for several months, being treated for costochondritis and planning accupuncture soon. No dyspnea, cough, wheeze. No f/c/s    March 2023 previously  She had previously been evaluated for possible myeloma and had a CT chest showing lung nodules. She was first evaluated at [facility] with Dr. Erika Teran then at [facility] with Dr. Janes Guo and obtained a follow up CT chest. The repeat CT chest demonstrated concern for possible ILD and bronchiectasis leading to her evaluation here. She denies new concerns today. No cough, dyspnea. No fevers. No weight changes. Does have a hx of raynauds and previously seen by Dr. Edith Pizano with abnormal YOLANDA but repeat YOLANDA was unrevealing so did not follow up. Exercises daily  Retired, worked as , no exposures to dust, chemicals, fumes, asbestos or TB  Dog at home.  No other pets    Past Medical History:   Past Medical History:   Diagnosis Date    Amenorrhea 2004    Anemia     Anxiety     Arthritis     Broken leg 1999    Diverticulosis     Dysmenorrhea Until menopause 2004    Dyspareunia Menopause 2004    Frozen shoulder     H/O bone scan 05/12/2014    osteopenia    H1N1 influenza 2009    History of pneumonia as a child     Hyperlipidemia     Lichen sclerosus     Osteopenia     Pap smear for cervical cancer screening 5-7-2010,6-2-2011, 6-,4-,6-3-2015    wnl neg hpv 2014    Pneumonia due to organism Age 5    Raynaud's disease     Urinary incontinence     Vestibulitis, vulvar 2010    Vulvodynia 2010        Past Surgical History:   Past Surgical History:   Procedure Laterality Date    BIOPSY OF BREAST, NEEDLE CORE  01/01/2002    COLONOSCOPY  2003 & 2013    COLPOSCOPY, CERVIX W/UPPER ADJACENT VAGINA; W/BIOPSY(S), [address]    TREVIN BIOPSY STEREO NODULE 1 SITE LEFT (CPT=19081)      TREVIN BIOPSY STEREO NODULE 2 SITE BILAT (CPT=19081/38914)      2002 benign    NAIL REMOVAL  ,       1984, ,     OTHER SURGICAL HISTORY      cryosurgeries x 2    OTHER SURGICAL HISTORY      stereotactic biopsy       Family Medical History:   Family History   Problem Relation Age of Onset    Heart Disorder Father         cardiac stent    Hypertension Father     Lipids Father     Other (Other) Father         nephrolithiasis    Dementia Father     Hypertension Mother     Other (Other) Mother         dementia    Dementia Mother     Diabetes Maternal Grandmother         No    Heart Disorder Maternal Grandmother         No    Hypertension Maternal Grandmother         No    Other (Other) Maternal Grandmother         leukemia    Diabetes Maternal Grandfather     Heart Disorder Maternal Grandfather     Hypertension Maternal Grandfather     Stroke Maternal Grandfather     Diabetes Paternal Grandmother     Other (Other) Paternal Grandmother         dementia, glaucoma    Diabetes Paternal Grandfather         No    Heart Disorder Paternal Grandfather         MI?     Other (Other) Brother         parkinsons    Asthma Brother     Breast Cancer Maternal Cousin Female         her  52's    Breast Cancer Paternal Cousin Female         her40's    Other (Other) Other         leukemia    Heart Disorder Maternal Grandfather         No    Hypertension Maternal Grandfather         Not known    Cancer Sister     Depression Brother         Social History: Social History    Socioeconomic History      Marital status:       Spouse name: Not on file      Number of children: Not on file      Years of education: Not on file      Highest education level: Not on file    Occupational History      Not on file    Tobacco Use      Smoking status: Never      Smokeless tobacco: Never Vaping Use      Vaping Use: Never used    Substance and Sexual Activity      Alcohol use: No      Drug use: No      Sexual activity: Never        Partners: Male    Other Topics      Concerns:         Service: Not Asked        Blood Transfusions: Not Asked        Caffeine Concern: Yes          occas green tea, occas chocolate, occas soda         Occupational Exposure: Not Asked        Hobby Hazards: Not Asked        Sleep Concern: Not Asked        Stress Concern: Not Asked        Weight Concern: Not Asked        Special Diet: Not Asked        Back Care: Not Asked        Exercise: Yes          treadmill daily        Bike Helmet: Not Asked        Seat Belt: Yes        Self-Exams: Not Asked    Social History Narrative      Not on file    Social Determinants of Health  Financial Resource Strain: Not on file  Food Insecurity: Not on file  Transportation Needs: Not on file  Physical Activity: Not on file  Stress: Not on file  Social Connections: Not on file  Housing Stability: Not on file     Medications:   Current Outpatient Medications   Medication Sig Dispense Refill    ATORVASTATIN 10 MG Oral Tab TAKE 1 TABLET(10 MG) BY MOUTH EVERY NIGHT 90 tablet 1    Wheat Dextrin (BENEFIBER OR) Take by mouth. Once daily      Flaxseed, Linseed, (GROUND FLAX SEEDS OR) Take by mouth. Every other day. clobetasol 0.05 % External Cream Apply 1 Application. topically 2 (two) times daily as needed. 30 g 1    Cholecalciferol (VITAMIN D3) 1000 units Oral Cap       Omega-3 Fatty Acids (FISH OIL) 1000 MG Oral Cap Take 1,000 mg by mouth daily. Review of Systems: Review of Systems   Constitutional: Negative. HENT: Negative. Respiratory: Negative. Cardiovascular: Negative. All other systems reviewed and are negative. Physical Exam:  There were no vitals taken for this visit. Constitutional: alert, cooperative. No acute distress. Results:  Personally reviewed    WBC: 4.2, done on 5/3/2023.   HGB: 12.6, done on 5/3/2023. PLT: 217, done on 5/3/2023. Glucose: 95, done on 5/3/2023. Cr: 0.82, done on 5/3/2023. GFR(AA): 84, done on 5/18/2022. GFR (non-AA): 73, done on 5/18/2022. CA: 9.4, done on 5/3/2023. Na: 135, done on 5/3/2023. K: 4, done on 5/3/2023. Cl: 107, done on 5/3/2023. CO2: 27, done on 5/3/2023. Last ALB was 3.8% done on 5/3/2023. CT CHEST HI RESOLUTION (CPT=71250)    Result Date: 8/22/2023  CONCLUSION:  1. Stable subpleural reticulation is noted. There is no end-stage fibrosis or honeycombing. The findings predominate at the apices and generally have a peribronchial distribution. Findings are nonspecific but not typical for UIP. Chronic hypersensitivity pneumonitis could potentially have this appearance. 2. Right lower lobe lung nodule has been previously described and is stable. No additional follow-up can be recommended unless patient meets criteria for screening chest CT. 3. There is mild coronary artery atherosclerosis. NOTE:  This high-resolution chest CT examination is designed for evaluation of interstitial lung disease, bronchiectasis and emphysema and is therefore not the examination of choice for adenopathy or other more general indications. LOCATION:  [place]    Dictated by (CST): William Chirinos MD on 8/22/2023 at 11:18 AM     Finalized by (CST): William Chirinos MD on 8/22/2023 at 11:24 AM       XR RIBS, BILATERAL (3 VIEWS) (CPT=71110)    Result Date: 8/9/2023  CONCLUSION:  There is biapical pleural parenchymal scarring with pleural thickening. LOCATION:  THE [facility]    Dictated by (CST): William Chirinos MD on 8/09/2023 at 9:54 AM     Finalized by (CST): William Chirinos MD on 8/09/2023 at 9:56 AM       [facility] NELLI 2D+3D SCREENING BILAT (QWV=71077/16879)    Result Date: 8/9/2023  CONCLUSION:  No suspicious change from prior mammography. No mammographic evidence of malignancy. BI-RADS CATEGORY:  DIAGNOSTIC CATEGORY 1--NEGATIVE ASSESSMENT.    RECOMMENDATIONS:  ROUTINE MAMMOGRAM AND CLINICAL EVALUATION IN 12 MONTHS. Because of breast density, this patient may benefit from supplemental screening with breast MRI, Molecular Breast Imaging (MBI) or bilateral whole breast ultrasound for increased sensitivity for detection of cancer which can be obscured mammographically. Please contact your ordering provider to discuss supplemental screening options. A letter explaining the results in lay terms has been sent to the patient. This exam was evaluated with a computer-aided device. This patient's information has been entered into a reminder system with a target due date for the next mammogram.   LOCATION:  THE [facility]   Dictated by (CST): Lynn Gibbons MD on 8/09/2023 at 9:34 AM     Finalized by (CST): Lynn Gibbons MD on 8/09/2023 at 9:35 AM         Assessment/Plan:  #1. ILD  Findings noted incidentally on CT chest in 2018 9/2021 CT chest with minimal peripheral reticular opacities mainly in BUL. +bronchiectasis  8/2022 CT with no change  9/2022 no immunoglobulin deficiency. Negative ANCA. YOLANDA drawn but not performed? 5/2023 negative YOLANDA  8/2023 CT chest with no change   8/2023 PFTs with stable FVC, TLC and DLCO; however FEV1 down 500ml (1.9 to 1.4L); she denies any respiratory symptoms but does have costochodritis which is the likely culprit for her reduced FEV1 but other measures being stable  Plan to repeat Pfts in six months to reassess  Her CT findings of ILD have been stable since 2018, will hold on any further testing    #2. bronchiectasis  As above  Asymptomatic, advised to call if develops symptoms to start airway clearance/ flutter valve    #3. Pulmonary nodules  As above; stable. No need for further CT imaging    Hailee Dodson MD    This visit is conducted using Telemedicine with live, interactive video and audio. Patient has been referred to the [facility] website at [email or web address] to review the yearly Consent to Treat document.     Patient understands and accepts financial responsibility for any deductible, co-insurance and/or co-pays associated with this service.

## 2023-09-11 ENCOUNTER — APPOINTMENT (OUTPATIENT)
Dept: PHYSICAL THERAPY | Age: 68
End: 2023-09-11
Attending: FAMILY MEDICINE
Payer: MEDICARE

## 2023-09-13 ENCOUNTER — OFFICE VISIT (OUTPATIENT)
Dept: PHYSICAL THERAPY | Age: 68
End: 2023-09-13
Attending: FAMILY MEDICINE
Payer: MEDICARE

## 2023-09-13 PROCEDURE — 97110 THERAPEUTIC EXERCISES: CPT

## 2023-09-13 PROCEDURE — 97140 MANUAL THERAPY 1/> REGIONS: CPT

## 2023-09-15 ENCOUNTER — OFFICE VISIT (OUTPATIENT)
Dept: PHYSICAL THERAPY | Age: 68
End: 2023-09-15
Attending: FAMILY MEDICINE
Payer: MEDICARE

## 2023-09-15 PROCEDURE — 97110 THERAPEUTIC EXERCISES: CPT

## 2023-09-15 PROCEDURE — 97140 MANUAL THERAPY 1/> REGIONS: CPT

## 2023-09-18 ENCOUNTER — OFFICE VISIT (OUTPATIENT)
Dept: FAMILY MEDICINE CLINIC | Facility: CLINIC | Age: 68
End: 2023-09-18
Payer: COMMERCIAL

## 2023-09-18 ENCOUNTER — OFFICE VISIT (OUTPATIENT)
Dept: PHYSICAL THERAPY | Age: 68
End: 2023-09-18
Attending: FAMILY MEDICINE
Payer: MEDICARE

## 2023-09-18 VITALS
DIASTOLIC BLOOD PRESSURE: 66 MMHG | BODY MASS INDEX: 19.48 KG/M2 | HEART RATE: 60 BPM | SYSTOLIC BLOOD PRESSURE: 112 MMHG | WEIGHT: 115.5 LBS | OXYGEN SATURATION: 100 % | HEIGHT: 64.5 IN | RESPIRATION RATE: 18 BRPM

## 2023-09-18 DIAGNOSIS — J84.9 INTERSTITIAL LUNG DISEASE (HCC): Primary | ICD-10-CM

## 2023-09-18 DIAGNOSIS — R91.8 MULTIPLE NODULES OF LUNG: ICD-10-CM

## 2023-09-18 DIAGNOSIS — Z79.899 ON STATIN THERAPY: ICD-10-CM

## 2023-09-18 DIAGNOSIS — Z71.85 VACCINE COUNSELING: ICD-10-CM

## 2023-09-18 DIAGNOSIS — E78.00 PURE HYPERCHOLESTEROLEMIA: ICD-10-CM

## 2023-09-18 DIAGNOSIS — R07.89 CHEST WALL PAIN: ICD-10-CM

## 2023-09-18 PROCEDURE — 3008F BODY MASS INDEX DOCD: CPT | Performed by: FAMILY MEDICINE

## 2023-09-18 PROCEDURE — 3074F SYST BP LT 130 MM HG: CPT | Performed by: FAMILY MEDICINE

## 2023-09-18 PROCEDURE — 1159F MED LIST DOCD IN RCRD: CPT | Performed by: FAMILY MEDICINE

## 2023-09-18 PROCEDURE — 97110 THERAPEUTIC EXERCISES: CPT

## 2023-09-18 PROCEDURE — 1170F FXNL STATUS ASSESSED: CPT | Performed by: FAMILY MEDICINE

## 2023-09-18 PROCEDURE — 97140 MANUAL THERAPY 1/> REGIONS: CPT

## 2023-09-18 PROCEDURE — 99214 OFFICE O/P EST MOD 30 MIN: CPT | Performed by: FAMILY MEDICINE

## 2023-09-18 PROCEDURE — 1160F RVW MEDS BY RX/DR IN RCRD: CPT | Performed by: FAMILY MEDICINE

## 2023-09-18 PROCEDURE — 3078F DIAST BP <80 MM HG: CPT | Performed by: FAMILY MEDICINE

## 2023-09-18 RX ORDER — MULTIVIT-MIN/IRON/FOLIC ACID/K 18-600-40
CAPSULE ORAL
COMMUNITY

## 2023-09-20 ENCOUNTER — OFFICE VISIT (OUTPATIENT)
Dept: PHYSICAL THERAPY | Age: 68
End: 2023-09-20
Attending: FAMILY MEDICINE
Payer: MEDICARE

## 2023-09-20 PROCEDURE — 97110 THERAPEUTIC EXERCISES: CPT

## 2023-09-20 PROCEDURE — 97140 MANUAL THERAPY 1/> REGIONS: CPT

## 2023-09-20 NOTE — PROGRESS NOTES
Diagnosis:   Chest wall pain (R07.89)  Acquired short leg syndrome on left (M21.70)  Chronic bilateral back pain, unspecified back location (M54.9,G89.29)  Kyphosis of cervicothoracic region, unspecified kyphosis type (B78.649)       Referring Provider: Moriah Gutierrez  Date of Evaluation:    8/28/2023    Precautions:  Drug Allergy Next MD visit:     Date of Surgery: n/a   Insurance Primary/Secondary: Michael [employer] /      # Auth Visits: 8            Subjective: I had the first session of Acupuncture yesterday. I feel light pain in the front of the chest today is sore in the sternum region and lower ribs but not too severe. Walked 3 miles on the treadmill this morning at a pace of 4 mph. Pain: 2-6/10 soreness around mid chest, ribcage and shoulder blades      Objective: Tightness of pec minor, fwd position R shoulder  Normal overhead shoulder flexion with pulling lower sternum and ribs    Assessment: Pt is progressing well with PT. Treatment focusing on thoracic spine mobility, stretching of pec and lat muscles, strengthening of scapular and upper back muscles.      Goals:   (to be met in 8 visits)   Decrease tenderness upper trapezius and levator scapulae to enable pt to reach and lift with less discomfort  Decrease mid thoracic spine tenderness to enable pt to sit and lay supine comfortably  Pt will be able to perform regular housework duties without limitation due to neck/back pain    Plan: Continue PT for thoracic spine mobilizations, STM, scapular strengthening, breathing exercises  Date: 9/6/2023  TX#: 2/8 Date:9/13/2023                TX#: 3/8 Date:9/15/2023                TX#: 4/8 Date:9/18/2023                TX#: 5/8 Date: 9/20/2023  Tx#: 6/8   Standing wall slide sh flexion x 10  Mini wall push ups x 10  Back against wall alternating sh flexion overhead R/L x 5  B sh horizontal abd @ 90 deg against wall x 10 UBE level 3 4 mins 2' fwd, 2' bwd   X 10  Wall slide sh flexion x 10 using towel  Back against wall B sh horizontal abd @ 90 deg flexion x 10  Back against wall alt sh overhead flexion R/L x 10 UBE level 3 4 mins  Standing wall slide using towel x 15  Standing back against wall chest pulls with red t band x 15  Alt sh flexion R/L x 10  Wall mini push ups x 20 UBE level 3 5 mins  Wall slide sh flexion using towel x 15  Hands on wall @ 90 deg performed trunk rotation with UE reach R/L x 10  Supine pec stretch R/L 3 x 20 sec hold  Supine B sh horizontal abduction x 10 UBE level 3 5 mins  Wall slide sh flexion using towel x 15  Standing against wall chest pulls with red t band x 10  Alt sh flexion against wall R/L x 10  Wall push ups x 15   UBE level 2 4 mins 2' fwd, 2' bwd Supine PROM sh flex, abd, ER, IR, horizontal abd/add R x 10, L x 10 Supine PROM sh flex, abd, horizontal abd, ER, IR 5 mins  Prone over pillow Thoracic spine mobilizations Gr 3 5 mins Supine passive sh flex, abd, ER R/L x 10  Pec stretch R/L 3 x 20 sec hold   Supine PROM sh flexion, abduction, ER, horizontal abduction R/L x 10  Red t band chest pulls x 10  S lying scapular mobilizations Gr 3 2 mins each R/L  S lying trunk rotation with UE reach R/L x 10 Supine pec stretch R/L 3 x 20 sec hold  Red t band chest pulls x 10  S lying trunk rotation with UE reach R/L x 10  S lying scapular clock mobilizations R 2 mins, L 2 mins Supine pec stretch GH joint post glide Gr 4 30 secs each R/L x 3  Supine red t band chest pulls x 20  S lying trunk rotation with UE reach R/L x 10  Prone over pillow central PA mobilizations T 2- 10 Gr 3 30 secs each level Prone sh abd R/L x 10  Prone scap squeeze x 10  S lying scapular mobilizations retraction, depression 30 secs each R/L  S lying thoracic PA mobilizations Gr 3 30 secs each level T4-8 Prone lying mobilizations Gr 3 central PA T2-8 30 secs  Prone B sh ext x 10, B sh abd x 10, upper body ext x 10  S lying scapula mobilizations R/L 2 mins   Supine GH post glide, pec stretch 30 secs Prone over pillows PA mobilizations T2-T10 Gr 3 30 secs each level  STM mid thoracic paraspinals 5 mins Prone over pillow STM paraspinal muscles 5 mins  Upper back ext x 15  Scapular squeeze x 15 Hook lying instruction in diaphragmatic breathing x 3  Instruction in lower rib expansion breathing x 3 Supine sterocostal joint mobilizations Gr 2 3 mins   STM terres, subscap, serratus in axilla 5 mins   Prone lying PA mobilizations Gr 2-3 T1-8 30 secs each level  STM thoracic paraspinal muscles 5 mins  Prone scapular squeezes 2 x 10  Prone upper back ext x 10 Prone scapular squeezes x 10  Upper body ext x 10  Seated posture education, upgraded HEP 5 mins  Hook lying sternocostal joint mobilizations Gr 2 3 mins R/L  STM serratus, terres, subscap muscles in the axilla 5 mins   S lying trunk rotation with UE reach R/L x 10   HEP: Red t band chest pulls, sh flexion slide on wall, s lying trunk rotation with UE reach, diaphragmatic and lateral breathing exercises    Charges: EX 1, MT 2      Total Timed Treatment: Ther ex (15 mins), Man (30 mins)  Total Treatment Time: 45 min

## 2023-09-27 ENCOUNTER — OFFICE VISIT (OUTPATIENT)
Dept: PHYSICAL THERAPY | Age: 68
End: 2023-09-27
Attending: FAMILY MEDICINE
Payer: MEDICARE

## 2023-09-27 PROCEDURE — 97110 THERAPEUTIC EXERCISES: CPT

## 2023-09-27 PROCEDURE — 97140 MANUAL THERAPY 1/> REGIONS: CPT

## 2023-09-27 NOTE — PROGRESS NOTES
Diagnosis:   Chest wall pain (R07.89)  Acquired short leg syndrome on left (M21.70)  Chronic bilateral back pain, unspecified back location (M54.9,G89.29)  Kyphosis of cervicothoracic region, unspecified kyphosis type (S30.273)       Referring Provider: Diony Fu  Date of Evaluation:    8/28/2023    Precautions:  Drug Allergy Next MD visit:     Date of Surgery: n/a   Insurance Primary/Secondary: [address] [place],9D /      # Auth Visits: 8            Subjective: Increased pain for 1 day after babysitting grandson on 9/22. Had the second session of Acupuncture yesterday 9/26. I feel light pain in the front of the chest today is sore in the sternum region and lower ribs but not too severe. Walked 3 miles on the treadmill this morning at a pace of 4 mph. Pain: Current pain 2-3/10 soreness around mid chest, ribcage and axilla            Pain 9/23 6-9/10 radiating around lower ribs and sternum all day long      Objective: Tightness of pec minor, fwd position R shoulder  Normal overhead shoulder flexion with pulling lower sternum and ribs  Marked tenderness over 3rd, 4th rib and Chostochondral joints at sternum on the R    Assessment: Pt is progressing well with PT. Treatment focusing on thoracic spine mobility, stretching of pec and lat muscles, strengthening of scapular and upper back muscles.  Muscle energy for Rib and Chostochondral joints    Goals:   (to be met in 8 visits)   Decrease tenderness upper trapezius and levator scapulae to enable pt to reach and lift with less discomfort  Decrease mid thoracic spine tenderness to enable pt to sit and lay supine comfortably  Pt will be able to perform regular housework duties without limitation due to neck/back pain    Plan: Continue PT for thoracic spine mobilizations, STM, scapular strengthening, breathing exercises  Date: 9/6/2023  TX#: 2/8 Date:9/13/2023                TX#: 3/8 Date:9/15/2023                TX#: 4/8 Date:9/18/2023                TX#: 5/8 Date: 9/20/2023  Tx#: 6/8 Date:9/27/2023  Tx#: 7/8   Standing wall slide sh flexion x 10  Mini wall push ups x 10  Back against wall alternating sh flexion overhead R/L x 5  B sh horizontal abd @ 90 deg against wall x 10 UBE level 3 4 mins 2' fwd, 2' bwd   X 10  Wall slide sh flexion x 10 using towel  Back against wall B sh horizontal abd @ 90 deg flexion x 10  Back against wall alt sh overhead flexion R/L x 10 UBE level 3 4 mins  Standing wall slide using towel x 15  Standing back against wall chest pulls with red t band x 15  Alt sh flexion R/L x 10  Wall mini push ups x 20 UBE level 3 5 mins  Wall slide sh flexion using towel x 15  Hands on wall @ 90 deg performed trunk rotation with UE reach R/L x 10  Supine pec stretch R/L 3 x 20 sec hold  Supine B sh horizontal abduction x 10 UBE level 3 5 mins  Wall slide sh flexion using towel x 15  Standing against wall chest pulls with red t band x 10  Alt sh flexion against wall R/L x 10  Wall push ups x 15 UBE level 3 5 mins   X 20  Standing with back against wall alt sh flex overhead with 1# wts R/L x 10  B sh horizontal abd with 13 x 10  Wall push ups x 20     UBE level 2 4 mins 2' fwd, 2' bwd Supine PROM sh flex, abd, ER, IR, horizontal abd/add R x 10, L x 10 Supine PROM sh flex, abd, horizontal abd, ER, IR 5 mins  Prone over pillow Thoracic spine mobilizations Gr 3 5 mins Supine passive sh flex, abd, ER R/L x 10  Pec stretch R/L 3 x 20 sec hold Prone central PA mobilizations T2-T10 Gr 3 30 secs  Prone B sh abd x 10  Supine passive shoulder flex, abd, ER R/L x 10   Supine PROM sh flexion, abduction, ER, horizontal abduction R/L x 10  Red t band chest pulls x 10  S lying scapular mobilizations Gr 3 2 mins each R/L  S lying trunk rotation with UE reach R/L x 10 Supine pec stretch R/L 3 x 20 sec hold  Red t band chest pulls x 10  S lying trunk rotation with UE reach R/L x 10  S lying scapular clock mobilizations R 2 mins, L 2 mins Supine pec stretch GH joint post glide Gr 4 30 secs each R/L x 3  Supine red t band chest pulls x 20  S lying trunk rotation with UE reach R/L x 10  Prone over pillow central PA mobilizations T 2- 10 Gr 3 30 secs each level Prone sh abd R/L x 10  Prone scap squeeze x 10  S lying scapular mobilizations retraction, depression 30 secs each R/L  S lying thoracic PA mobilizations Gr 3 30 secs each level T4-8 Prone lying mobilizations Gr 3 central PA T2-8 30 secs  Prone B sh ext x 10, B sh abd x 10, upper body ext x 10  S lying scapula mobilizations R/L 2 mins Supine pec stretch R/L 3 x 20 sec hold  Muscle energy 3rd, 4th rib ant on the R sustained post glide at chostochondral joint with deep breathing 3 x 10 secs  Manual stretch sub-occipital, scalenes, upper trapezius 5 mins   Supine GH post glide, pec stretch 30 secs Prone over pillows PA mobilizations T2-T10 Gr 3 30 secs each level  STM mid thoracic paraspinals 5 mins Prone over pillow STM paraspinal muscles 5 mins  Upper back ext x 15  Scapular squeeze x 15 Hook lying instruction in diaphragmatic breathing x 3  Instruction in lower rib expansion breathing x 3 Supine sternocostal joint mobilizations Gr 2 3 mins   STM terres, subscap, serratus in axilla 5 mins    Prone lying PA mobilizations Gr 2-3 T1-8 30 secs each level  STM thoracic paraspinal muscles 5 mins  Prone scapular squeezes 2 x 10  Prone upper back ext x 10 Prone scapular squeezes x 10  Upper body ext x 10  Seated posture education, upgraded HEP 5 mins  Hook lying sternocostal joint mobilizations Gr 2 3 mins R/L  STM serratus, terres, subscap muscles in the axilla 5 mins   S lying trunk rotation with UE reach R/L x 10    HEP: Red t band chest pulls, sh flexion slide on wall, s lying trunk rotation with UE reach, diaphragmatic and lateral breathing exercises    Charges: EX 1, MT 2      Total Timed Treatment: Ther ex (15 mins), Man (30 mins)  Total Treatment Time: 45 min

## 2023-10-02 ENCOUNTER — APPOINTMENT (OUTPATIENT)
Dept: PHYSICAL THERAPY | Age: 68
End: 2023-10-02
Attending: FAMILY MEDICINE
Payer: MEDICARE

## 2023-10-04 ENCOUNTER — OFFICE VISIT (OUTPATIENT)
Dept: PHYSICAL THERAPY | Age: 68
End: 2023-10-04
Attending: FAMILY MEDICINE
Payer: MEDICARE

## 2023-10-04 PROCEDURE — 97140 MANUAL THERAPY 1/> REGIONS: CPT

## 2023-10-04 PROCEDURE — 97110 THERAPEUTIC EXERCISES: CPT

## 2023-10-04 NOTE — PROGRESS NOTES
Diagnosis:   Chest wall pain (R07.89)  Acquired short leg syndrome on left (M21.70)  Chronic bilateral back pain, unspecified back location (M54.9,G89.29)  Kyphosis of cervicothoracic region, unspecified kyphosis type (H01.798)       Referring Provider: Kaylee Wood  Date of Evaluation:    8/28/2023    Precautions:  Drug Allergy Next MD visit:     Date of Surgery: n/a   Insurance Primary/Secondary: [address] [place],9D / [facility]     # Auth Visits: 12           Subjective: Severe pain for 1 day after having to lift her 25# dog at the vet office. Had the third session of tx using [facility] at [address] [place] [address] 2 days ago which helped to calm the severe pain down. I feel some pain in the front of the chest today is sore in the sternum region and lower ribs but not too severe. Still walking 3 miles on the treadmill daily at a pace of 4 mph. Pain: Current pain 2-3/10 soreness around mid chest, ribcage and axilla            Pain 10/1-2, 9/10 radiating around lower ribs and sternum all day long      Objective: Pain with activation of abdominal muscles lifting head in lying, turning over on tx table  Tightness of pec minor, fwd position R shoulder  Normal overhead shoulder flexion with pulling lower sternum and ribs  Marked tenderness over 3rd, 4th rib and Chostochondral joints at sternum on the R    Assessment: Pt continues to have irritation of chostochondral joints which is exacerbated with lifting. She had difficulty and pain with activation of abdominal muscles when turning over on tx table today. She may have strained these lifting the dog at the vet. Instructed pt in log rolling to turn over and avoid lifting head from supine position. Treatment focusing on thoracic spine mobility, stretching of pec and lat muscles, strengthening of scapular and upper back muscles.  Muscle energy for Rib and Chostochondral joints    Goals:   (to be met in 12 visits)   Decrease tenderness upper trapezius and levator scapulae to enable pt to reach and lift with less discomfort  Decrease mid thoracic spine tenderness to enable pt to sit and lay supine comfortably  Pt will be able to perform regular housework duties without limitation due to neck/back pain    Plan: Continue PT x 4 remaining visits for thoracic spine mobilizations, STM, scapular strengthening, breathing exercises  Date: 9/6/2023  TX#: 2/8 Date:9/13/2023                TX#: 3/8 Date:9/15/2023                TX#: 4/8 Date:9/18/2023                TX#: 5/8 Date: 9/20/2023  Tx#: 6/8 Date:9/27/2023  Tx#: 7/8 Date: 10/4/2023  Tx#: 8/8   Standing wall slide sh flexion x 10  Mini wall push ups x 10  Back against wall alternating sh flexion overhead R/L x 5  B sh horizontal abd @ 90 deg against wall x 10 UBE level 3 4 mins 2' fwd, 2' bwd   X 10  Wall slide sh flexion x 10 using towel  Back against wall B sh horizontal abd @ 90 deg flexion x 10  Back against wall alt sh overhead flexion R/L x 10 UBE level 3 4 mins  Standing wall slide using towel x 15  Standing back against wall chest pulls with red t band x 15  Alt sh flexion R/L x 10  Wall mini push ups x 20 UBE level 3 5 mins  Wall slide sh flexion using towel x 15  Hands on wall @ 90 deg performed trunk rotation with UE reach R/L x 10  Supine pec stretch R/L 3 x 20 sec hold  Supine B sh horizontal abduction x 10 UBE level 3 5 mins  Wall slide sh flexion using towel x 15  Standing against wall chest pulls with red t band x 10  Alt sh flexion against wall R/L x 10  Wall push ups x 15 UBE level 3 5 mins   X 20  Standing with back against wall alt sh flex overhead with 1# wts R/L x 10  B sh horizontal abd with 13 x 10  Wall push ups x 20   UBE level 3 5 mins   X 15  S lying trunk rotation with UE reach R/L x 5  Supine pec stretch 20 sec hold R/L x 3  Grade 2 mobilizations chostochondral joints 3,4,5 ribs R/L 30 secs   UBE level 2 4 mins 2' fwd, 2' bwd Supine PROM sh flex, abd, ER, IR, horizontal abd/add R x 10, L x 10 Supine PROM sh flex, abd, horizontal abd, ER, IR 5 mins  Prone over pillow Thoracic spine mobilizations Gr 3 5 mins Supine passive sh flex, abd, ER R/L x 10  Pec stretch R/L 3 x 20 sec hold Prone central PA mobilizations T2-T10 Gr 3 30 secs  Prone B sh abd x 10  Supine passive shoulder flex, abd, ER R/L x 10 Supine passive shoulder flex, abd, ER, IR R/L x 10 each  S lying STM thoracic paraspinal muscles 10 mins  Grade 2 PA mobilizations T4-8 30 secs each level   Supine PROM sh flexion, abduction, ER, horizontal abduction R/L x 10  Red t band chest pulls x 10  S lying scapular mobilizations Gr 3 2 mins each R/L  S lying trunk rotation with UE reach R/L x 10 Supine pec stretch R/L 3 x 20 sec hold  Red t band chest pulls x 10  S lying trunk rotation with UE reach R/L x 10  S lying scapular clock mobilizations R 2 mins, L 2 mins Supine pec stretch GH joint post glide Gr 4 30 secs each R/L x 3  Supine red t band chest pulls x 20  S lying trunk rotation with UE reach R/L x 10  Prone over pillow central PA mobilizations T 2- 10 Gr 3 30 secs each level Prone sh abd R/L x 10  Prone scap squeeze x 10  S lying scapular mobilizations retraction, depression 30 secs each R/L  S lying thoracic PA mobilizations Gr 3 30 secs each level T4-8 Prone lying mobilizations Gr 3 central PA T2-8 30 secs  Prone B sh ext x 10, B sh abd x 10, upper body ext x 10  S lying scapula mobilizations R/L 2 mins Supine pec stretch R/L 3 x 20 sec hold  Muscle energy 3rd, 4th rib ant on the R sustained post glide at chostochondral joint with deep breathing 3 x 10 secs  Manual stretch sub-occipital, scalenes, upper trapezius 5 mins Supine manual stretch sub-occipitals 2 mins  Sh depression stretch 3 x 20 sec hold  Instruction in log rolling 5 mins  Review of diaphragmatic and lateral rib breathing exercises   Supine GH post glide, pec stretch 30 secs Prone over pillows PA mobilizations T2-T10 Gr 3 30 secs each level  STM mid thoracic paraspinals 5 mins Prone over pillow STM paraspinal muscles 5 mins  Upper back ext x 15  Scapular squeeze x 15 Hook lying instruction in diaphragmatic breathing x 3  Instruction in lower rib expansion breathing x 3 Supine sternocostal joint mobilizations Gr 2 3 mins   STM terres, subscap, serratus in axilla 5 mins     Prone lying PA mobilizations Gr 2-3 T1-8 30 secs each level  STM thoracic paraspinal muscles 5 mins  Prone scapular squeezes 2 x 10  Prone upper back ext x 10 Prone scapular squeezes x 10  Upper body ext x 10  Seated posture education, upgraded HEP 5 mins  Hook lying sternocostal joint mobilizations Gr 2 3 mins R/L  STM serratus, terres, subscap muscles in the axilla 5 mins   S lying trunk rotation with UE reach R/L x 10     HEP: Sh flexion slide on wall, s lying trunk rotation with UE reach, diaphragmatic and lateral breathing exercises    Charges: EX 1, MT 2      Total Timed Treatment: Ther ex (15 mins), Man (30 mins)  Total Treatment Time: 45 min

## 2023-10-09 ENCOUNTER — OFFICE VISIT (OUTPATIENT)
Dept: PHYSICAL THERAPY | Age: 68
End: 2023-10-09
Attending: FAMILY MEDICINE
Payer: MEDICARE

## 2023-10-09 PROCEDURE — 97140 MANUAL THERAPY 1/> REGIONS: CPT

## 2023-10-09 PROCEDURE — 97110 THERAPEUTIC EXERCISES: CPT

## 2023-10-09 NOTE — PROGRESS NOTES
Diagnosis:   Chest wall pain (R07.89)  Acquired short leg syndrome on left (M21.70)  Chronic bilateral back pain, unspecified back location (M54.9,G89.29)  Kyphosis of cervicothoracic region, unspecified kyphosis type (O75.145)       Referring Provider: Sarita Amaya  Date of Evaluation:    8/28/2023    Precautions:  Drug Allergy Next MD visit:     Date of Surgery: n/a   Insurance Primary/Secondary: Gracia Clifford / NONI     # Auth Visits: 12           Subjective: I feel some pain in the front of the chest today is sore in the sternum region and lower ribs but not too severe. Still walking 3 miles on the treadmill daily at a pace of 4 mph. Pain: Current pain 2-3/10 soreness around mid chest, ribcage and axilla                Objective:   Tightness of pec minor, fwd position R shoulder  Normal overhead shoulder flexion with pulling lower sternum and ribs  Marked tenderness over costochondral joints at mid to lower portion of the sternum     Assessment: Pt continues to have irritation of chostochondral joints which is exacerbated with lifting. Treatment focusing on thoracic spine mobility, stretching of pec and lat muscles, strengthening of scapular and upper back muscles.      Goals:   (to be met in 12 visits)   Decrease tenderness upper trapezius and levator scapulae to enable pt to reach and lift with less discomfort  Decrease mid thoracic spine tenderness to enable pt to sit and lay supine comfortably  Pt will be able to perform regular housework duties without limitation due to neck/back pain    Plan: Continue PT x 3 remaining visits for thoracic spine mobilizations, STM, scapular strengthening, breathing exercises  Date: 9/6/2023  TX#: 2/8 Date:9/13/2023                TX#: 3/8 Date:9/15/2023                TX#: 4/8 Date:9/18/2023                TX#: 5/8 Date: 9/20/2023  Tx#: 6/8 Date:9/27/2023  Tx#: 7/8 Date: 10/4/2023  Tx#: 8/8 Date: 10/9/2023  Tx#: 9/12   Standing wall slide sh flexion x 10  Mini wall push ups x 10  Back against wall alternating sh flexion overhead R/L x 5  B sh horizontal abd @ 90 deg against wall x 10 UBE level 3 4 mins 2' fwd, 2' bwd   X 10  Wall slide sh flexion x 10 using towel  Back against wall B sh horizontal abd @ 90 deg flexion x 10  Back against wall alt sh overhead flexion R/L x 10 UBE level 3 4 mins  Standing wall slide using towel x 15  Standing back against wall chest pulls with red t band x 15  Alt sh flexion R/L x 10  Wall mini push ups x 20 UBE level 3 5 mins  Wall slide sh flexion using towel x 15  Hands on wall @ 90 deg performed trunk rotation with UE reach R/L x 10  Supine pec stretch R/L 3 x 20 sec hold  Supine B sh horizontal abduction x 10 UBE level 3 5 mins  Wall slide sh flexion using towel x 15  Standing against wall chest pulls with red t band x 10  Alt sh flexion against wall R/L x 10  Wall push ups x 15 UBE level 3 5 mins   X 20  Standing with back against wall alt sh flex overhead with 1# wts R/L x 10  B sh horizontal abd with 13 x 10  Wall push ups x 20   UBE level 3 5 mins   X 15  S lying trunk rotation with UE reach R/L x 5  Supine pec stretch 20 sec hold R/L x 3  Grade 2 mobilizations chostochondral joints 3,4,5 ribs R/L 30 secs UBE level 3 5 mins  Standing wall slide sh flexion using towel x 20  Wall push ups x 20  Supine chest pulls using red t band x 20  Passive sh flex, abd R/L x 10  Manual pec stretch 3 x 10 sec hold R/L   UBE level 2 4 mins 2' fwd, 2' bwd Supine PROM sh flex, abd, ER, IR, horizontal abd/add R x 10, L x 10 Supine PROM sh flex, abd, horizontal abd, ER, IR 5 mins  Prone over pillow Thoracic spine mobilizations Gr 3 5 mins Supine passive sh flex, abd, ER R/L x 10  Pec stretch R/L 3 x 20 sec hold Prone central PA mobilizations T2-T10 Gr 3 30 secs  Prone B sh abd x 10  Supine passive shoulder flex, abd, ER R/L x 10 Supine passive shoulder flex, abd, ER, IR R/L x 10 each  S lying STM thoracic paraspinal muscles 10 mins  Grade 2 PA mobilizations T4-8 30 secs each level S lying scapula mobilizations Gr 3 2 mins R/L  STM scapular border medial and lateral 5 mins R/L  Supine manual stretch sub-occipitals 3 x 10 sec hold   Supine PROM sh flexion, abduction, ER, horizontal abduction R/L x 10  Red t band chest pulls x 10  S lying scapular mobilizations Gr 3 2 mins each R/L  S lying trunk rotation with UE reach R/L x 10 Supine pec stretch R/L 3 x 20 sec hold  Red t band chest pulls x 10  S lying trunk rotation with UE reach R/L x 10  S lying scapular clock mobilizations R 2 mins, L 2 mins Supine pec stretch GH joint post glide Gr 4 30 secs each R/L x 3  Supine red t band chest pulls x 20  S lying trunk rotation with UE reach R/L x 10  Prone over pillow central PA mobilizations T 2- 10 Gr 3 30 secs each level Prone sh abd R/L x 10  Prone scap squeeze x 10  S lying scapular mobilizations retraction, depression 30 secs each R/L  S lying thoracic PA mobilizations Gr 3 30 secs each level T4-8 Prone lying mobilizations Gr 3 central PA T2-8 30 secs  Prone B sh ext x 10, B sh abd x 10, upper body ext x 10  S lying scapula mobilizations R/L 2 mins Supine pec stretch R/L 3 x 20 sec hold  Muscle energy 3rd, 4th rib ant on the R sustained post glide at chostochondral joint with deep breathing 3 x 10 secs  Manual stretch sub-occipital, scalenes, upper trapezius 5 mins Supine manual stretch sub-occipitals 2 mins  Sh depression stretch 3 x 20 sec hold  Instruction in log rolling 5 mins  Review of diaphragmatic and lateral rib breathing exercises S lying PA mobilizations T 4-8 Gr 3 30 secs each  Pt education posture, lifting, deep breathing 5 mins   Supine GH post glide, pec stretch 30 secs Prone over pillows PA mobilizations T2-T10 Gr 3 30 secs each level  STM mid thoracic paraspinals 5 mins Prone over pillow STM paraspinal muscles 5 mins  Upper back ext x 15  Scapular squeeze x 15 Hook lying instruction in diaphragmatic breathing x 3  Instruction in lower rib expansion breathing x 3 Supine sternocostal joint mobilizations Gr 2 3 mins   STM terres, subscap, serratus in axilla 5 mins      Prone lying PA mobilizations Gr 2-3 T1-8 30 secs each level  STM thoracic paraspinal muscles 5 mins  Prone scapular squeezes 2 x 10  Prone upper back ext x 10 Prone scapular squeezes x 10  Upper body ext x 10  Seated posture education, upgraded HEP 5 mins  Hook lying sternocostal joint mobilizations Gr 2 3 mins R/L  STM serratus, terres, subscap muscles in the axilla 5 mins   S lying trunk rotation with UE reach R/L x 10      HEP: Sh flexion slide on wall, s lying trunk rotation with UE reach, diaphragmatic and lateral breathing exercises    Charges: EX 2, MT 1      Total Timed Treatment: Ther ex (25 mins), Man (20 mins)  Total Treatment Time: 45 min

## 2023-10-12 ENCOUNTER — OFFICE VISIT (OUTPATIENT)
Dept: PHYSICAL THERAPY | Age: 68
End: 2023-10-12
Attending: FAMILY MEDICINE
Payer: MEDICARE

## 2023-10-12 PROCEDURE — 97140 MANUAL THERAPY 1/> REGIONS: CPT

## 2023-10-12 PROCEDURE — 97110 THERAPEUTIC EXERCISES: CPT

## 2023-10-12 NOTE — PROGRESS NOTES
Diagnosis:   Chest wall pain (R07.89)  Acquired short leg syndrome on left (M21.70)  Chronic bilateral back pain, unspecified back location (M54.9,G89.29)  Kyphosis of cervicothoracic region, unspecified kyphosis type (C69.721)       Referring Provider: Kristina Azar  Date of Evaluation:    8/28/2023    Precautions:  Drug Allergy Next MD visit:     Date of Surgery: n/a   Insurance Primary/Secondary: [address] [place],9D / [facility]     # Auth Visits: 12         Discharge Note:  Subjective: Overall much improved since starting PT tx. I feel some pain in the sternum region and lower ribs but not too severe. Walking 3 miles on the treadmill daily at a pace of 4 mph, working on home exercises daily or every other day. Pain: Current pain 2/10 soreness lower sternum, ribcage. Objective:   Tightness of pec minor, fwd position R shoulder  Normal overhead shoulder flexion with pulling lower sternum and ribs  Tenderness over costochondral joints at mid to lower portion of the sternum   Hypomobility of mid thoracic spine T4-7 with PA    Assessment: Pt continues to have irritation of chostochondral joints which is exacerbated with lifting. Treatment focusing on thoracic spine mobility, stretching of pec and lat muscles, strengthening of scapular and upper back muscles. 2 of 3 goals met. Goals:   (to be met in 12 visits)   Decrease tenderness upper trapezius and levator scapulae to enable pt to reach and lift with less discomfort- not met  Decrease mid thoracic spine tenderness to enable pt to sit and lay supine comfortably- met  Pt will be able to perform regular housework duties without limitation due to neck/back pain- met    Plan: Continue HEP at this time, thoracic spine ROM, scapular strengthening, breathing exercises. Pt will be on vacation next week, MD follow up 10/16/2023.    Date: 9/6/2023  TX#: 2/8 Date:9/13/2023                TX#: 3/8 Date:9/15/2023                TX#: 4/8 Date:9/18/2023 TX#: 5/8 Date: 9/20/2023  Tx#: 6/8 Date:9/27/2023  Tx#: 7/8 Date: 10/4/2023  Tx#: 8/8 Date: 10/9/2023  Tx#: 9/12 Date: 10/12/2023  Tx#: 10/12   Standing wall slide sh flexion x 10  Mini wall push ups x 10  Back against wall alternating sh flexion overhead R/L x 5  B sh horizontal abd @ 90 deg against wall x 10 UBE level 3 4 mins 2' fwd, 2' bwd   X 10  Wall slide sh flexion x 10 using towel  Back against wall B sh horizontal abd @ 90 deg flexion x 10  Back against wall alt sh overhead flexion R/L x 10 UBE level 3 4 mins  Standing wall slide using towel x 15  Standing back against wall chest pulls with red t band x 15  Alt sh flexion R/L x 10  Wall mini push ups x 20 UBE level 3 5 mins  Wall slide sh flexion using towel x 15  Hands on wall @ 90 deg performed trunk rotation with UE reach R/L x 10  Supine pec stretch R/L 3 x 20 sec hold  Supine B sh horizontal abduction x 10 UBE level 3 5 mins  Wall slide sh flexion using towel x 15  Standing against wall chest pulls with red t band x 10  Alt sh flexion against wall R/L x 10  Wall push ups x 15 UBE level 3 5 mins   X 20  Standing with back against wall alt sh flex overhead with 1# wts R/L x 10  B sh horizontal abd with 13 x 10  Wall push ups x 20   UBE level 3 5 mins   X 15  S lying trunk rotation with UE reach R/L x 5  Supine pec stretch 20 sec hold R/L x 3  Grade 2 mobilizations chostochondral joints 3,4,5 ribs R/L 30 secs UBE level 3 5 mins  Standing wall slide sh flexion using towel x 20  Wall push ups x 20  Supine chest pulls using red t band x 20  Passive sh flex, abd R/L x 10  Manual pec stretch 3 x 10 sec hold R/L UBE level 3 5 mins   X 20     X 20  Standing chest pulls with red t band x 20  Standing alt sh flexion with 1# wts x 10 R/L  Biceps curls with 5# wts x 20   UBE level 2 4 mins 2' fwd, 2' bwd Supine PROM sh flex, abd, ER, IR, horizontal abd/add R x 10, L x 10 Supine PROM sh flex, abd, horizontal abd, ER, IR 5 mins  Prone over pillow Thoracic spine mobilizations Gr 3 5 mins Supine passive sh flex, abd, ER R/L x 10  Pec stretch R/L 3 x 20 sec hold Prone central PA mobilizations T2-T10 Gr 3 30 secs  Prone B sh abd x 10  Supine passive shoulder flex, abd, ER R/L x 10 Supine passive shoulder flex, abd, ER, IR R/L x 10 each  S lying STM thoracic paraspinal muscles 10 mins  Grade 2 PA mobilizations T4-8 30 secs each level S lying scapula mobilizations Gr 3 2 mins R/L  STM scapular border medial and lateral 5 mins R/L  Supine manual stretch sub-occipitals 3 x 10 sec hold Standing with hands on wall thoracic rotation with UE reach R/L x 10  Supine manual pec stretch 3 x 30 sec hold  Supine passive sh stretch flexion, abduction, ER x 5 R/L  Supine cervical spine rotation, side flexion, flexion x 5 each   Supine PROM sh flexion, abduction, ER, horizontal abduction R/L x 10  Red t band chest pulls x 10  S lying scapular mobilizations Gr 3 2 mins each R/L  S lying trunk rotation with UE reach R/L x 10 Supine pec stretch R/L 3 x 20 sec hold  Red t band chest pulls x 10  S lying trunk rotation with UE reach R/L x 10  S lying scapular clock mobilizations R 2 mins, L 2 mins Supine pec stretch GH joint post glide Gr 4 30 secs each R/L x 3  Supine red t band chest pulls x 20  S lying trunk rotation with UE reach R/L x 10  Prone over pillow central PA mobilizations T 2- 10 Gr 3 30 secs each level Prone sh abd R/L x 10  Prone scap squeeze x 10  S lying scapular mobilizations retraction, depression 30 secs each R/L  S lying thoracic PA mobilizations Gr 3 30 secs each level T4-8 Prone lying mobilizations Gr 3 central PA T2-8 30 secs  Prone B sh ext x 10, B sh abd x 10, upper body ext x 10  S lying scapula mobilizations R/L 2 mins Supine pec stretch R/L 3 x 20 sec hold  Muscle energy 3rd, 4th rib ant on the R sustained post glide at chostochondral joint with deep breathing 3 x 10 secs  Manual stretch sub-occipital, scalenes, upper trapezius 5 mins Supine manual stretch sub-occipitals 2 mins  Sh depression stretch 3 x 20 sec hold  Instruction in log rolling 5 mins  Review of diaphragmatic and lateral rib breathing exercises S lying PA mobilizations T 4-8 Gr 3 30 secs each  Pt education posture, lifting, deep breathing 5 mins Prone lying PA mobilizations Gr 3-4 T2-T10 30 secs each level   STM thoracic paraspinal muscles 10 mins  Prone upper back ext x 10  B sh abd x 10   Supine GH post glide, pec stretch 30 secs Prone over pillows PA mobilizations T2-T10 Gr 3 30 secs each level  STM mid thoracic paraspinals 5 mins Prone over pillow STM paraspinal muscles 5 mins  Upper back ext x 15  Scapular squeeze x 15 Hook lying instruction in diaphragmatic breathing x 3  Instruction in lower rib expansion breathing x 3 Supine sternocostal joint mobilizations Gr 2 3 mins   STM terres, subscap, serratus in axilla 5 mins    Seated diaphragmatic breathing x 5  Verbal review of HEP     Prone lying PA mobilizations Gr 2-3 T1-8 30 secs each level  STM thoracic paraspinal muscles 5 mins  Prone scapular squeezes 2 x 10  Prone upper back ext x 10 Prone scapular squeezes x 10  Upper body ext x 10  Seated posture education, upgraded HEP 5 mins  Hook lying sternocostal joint mobilizations Gr 2 3 mins R/L  STM serratus, terres, subscap muscles in the axilla 5 mins   S lying trunk rotation with UE reach R/L x 10       HEP: Sh flexion slide on wall, s lying trunk rotation with UE reach, diaphragmatic and lateral breathing exercises    Charges: EX 2, MT 1      Total Timed Treatment: Ther ex (25 mins), Man (20 mins)  Total Treatment Time: 45 min

## 2023-10-16 ENCOUNTER — OFFICE VISIT (OUTPATIENT)
Dept: FAMILY MEDICINE CLINIC | Facility: CLINIC | Age: 68
End: 2023-10-16
Payer: COMMERCIAL

## 2023-10-16 VITALS
DIASTOLIC BLOOD PRESSURE: 66 MMHG | SYSTOLIC BLOOD PRESSURE: 110 MMHG | OXYGEN SATURATION: 99 % | WEIGHT: 115 LBS | HEART RATE: 90 BPM | RESPIRATION RATE: 18 BRPM | HEIGHT: 64.5 IN | BODY MASS INDEX: 19.39 KG/M2

## 2023-10-16 DIAGNOSIS — M99.08 SOMATIC DYSFUNCTION OF CHEST WALL: ICD-10-CM

## 2023-10-16 DIAGNOSIS — M99.01 SOMATIC DYSFUNCTION OF SPINE, CERVICAL: ICD-10-CM

## 2023-10-16 DIAGNOSIS — M99.02 SOMATIC DYSFUNCTION OF SPINE, THORACIC: ICD-10-CM

## 2023-10-16 DIAGNOSIS — R07.89 CHEST WALL PAIN: Primary | ICD-10-CM

## 2023-10-16 DIAGNOSIS — M99.03 SOMATIC DYSFUNCTION OF SPINE, LUMBAR: ICD-10-CM

## 2023-10-16 RX ORDER — CYCLOBENZAPRINE HCL 10 MG
10 TABLET ORAL NIGHTLY PRN
Qty: 20 TABLET | Refills: 0 | Status: SHIPPED | OUTPATIENT
Start: 2023-10-16 | End: 2023-11-05

## 2023-11-13 ENCOUNTER — OFFICE VISIT (OUTPATIENT)
Dept: FAMILY MEDICINE CLINIC | Facility: CLINIC | Age: 68
End: 2023-11-13
Payer: COMMERCIAL

## 2023-11-13 VITALS
SYSTOLIC BLOOD PRESSURE: 112 MMHG | HEIGHT: 64.5 IN | BODY MASS INDEX: 19.39 KG/M2 | RESPIRATION RATE: 18 BRPM | OXYGEN SATURATION: 99 % | HEART RATE: 71 BPM | WEIGHT: 115 LBS | DIASTOLIC BLOOD PRESSURE: 66 MMHG

## 2023-11-13 DIAGNOSIS — M99.03 SOMATIC DYSFUNCTION OF SPINE, LUMBAR: ICD-10-CM

## 2023-11-13 DIAGNOSIS — Z71.85 VACCINE COUNSELING: ICD-10-CM

## 2023-11-13 DIAGNOSIS — M99.07 SOMATIC DYSFUNCTION OF RIGHT UPPER EXTREMITY: ICD-10-CM

## 2023-11-13 DIAGNOSIS — M99.07 SOMATIC DYSFUNCTION OF LEFT UPPER EXTREMITY: ICD-10-CM

## 2023-11-13 DIAGNOSIS — M99.08 SOMATIC DYSFUNCTION OF CHEST WALL: Primary | ICD-10-CM

## 2023-11-13 DIAGNOSIS — M99.04 SOMATIC DYSFUNCTION OF SPINE, SACRAL: ICD-10-CM

## 2023-11-13 DIAGNOSIS — M99.01 SOMATIC DYSFUNCTION OF SPINE, CERVICAL: ICD-10-CM

## 2023-11-13 DIAGNOSIS — M99.02 SOMATIC DYSFUNCTION OF SPINE, THORACIC: ICD-10-CM

## 2023-11-13 PROCEDURE — 1160F RVW MEDS BY RX/DR IN RCRD: CPT | Performed by: FAMILY MEDICINE

## 2023-11-13 PROCEDURE — 3074F SYST BP LT 130 MM HG: CPT | Performed by: FAMILY MEDICINE

## 2023-11-13 PROCEDURE — 3008F BODY MASS INDEX DOCD: CPT | Performed by: FAMILY MEDICINE

## 2023-11-13 PROCEDURE — 1159F MED LIST DOCD IN RCRD: CPT | Performed by: FAMILY MEDICINE

## 2023-11-13 PROCEDURE — 3078F DIAST BP <80 MM HG: CPT | Performed by: FAMILY MEDICINE

## 2023-11-13 PROCEDURE — 1170F FXNL STATUS ASSESSED: CPT | Performed by: FAMILY MEDICINE

## 2023-11-13 PROCEDURE — 98928 OSTEOPATH MANJ 7-8 REGIONS: CPT | Performed by: FAMILY MEDICINE

## 2023-11-20 ENCOUNTER — LAB ENCOUNTER (OUTPATIENT)
Dept: LAB | Age: 68
End: 2023-11-20
Attending: FAMILY MEDICINE
Payer: MEDICARE

## 2023-11-20 DIAGNOSIS — Z79.899 ON STATIN THERAPY: ICD-10-CM

## 2023-11-20 DIAGNOSIS — R79.89 ELEVATED LIVER FUNCTION TESTS: Primary | ICD-10-CM

## 2023-11-20 DIAGNOSIS — E78.00 PURE HYPERCHOLESTEROLEMIA: ICD-10-CM

## 2023-11-20 LAB
ALBUMIN SERPL-MCNC: 3.7 G/DL (ref 3.4–5)
ALBUMIN/GLOB SERPL: 1 {RATIO} (ref 1–2)
ALP LIVER SERPL-CCNC: 71 U/L
ALT SERPL-CCNC: 22 U/L
ANION GAP SERPL CALC-SCNC: 6 MMOL/L (ref 0–18)
AST SERPL-CCNC: 40 U/L (ref 15–37)
BILIRUB SERPL-MCNC: 0.6 MG/DL (ref 0.1–2)
BUN BLD-MCNC: 15 MG/DL (ref 9–23)
CALCIUM BLD-MCNC: 9.4 MG/DL (ref 8.5–10.1)
CHLORIDE SERPL-SCNC: 106 MMOL/L (ref 98–112)
CHOLEST SERPL-MCNC: 167 MG/DL (ref ?–200)
CO2 SERPL-SCNC: 26 MMOL/L (ref 21–32)
CREAT BLD-MCNC: 0.86 MG/DL
EGFRCR SERPLBLD CKD-EPI 2021: 73 ML/MIN/1.73M2 (ref 60–?)
FASTING PATIENT LIPID ANSWER: YES
FASTING STATUS PATIENT QL REPORTED: YES
GLOBULIN PLAS-MCNC: 3.7 G/DL (ref 2.8–4.4)
GLUCOSE BLD-MCNC: 91 MG/DL (ref 70–99)
HDLC SERPL-MCNC: 95 MG/DL (ref 40–59)
LDLC SERPL CALC-MCNC: 60 MG/DL (ref ?–100)
NONHDLC SERPL-MCNC: 72 MG/DL (ref ?–130)
OSMOLALITY SERPL CALC.SUM OF ELEC: 286 MOSM/KG (ref 275–295)
POTASSIUM SERPL-SCNC: 4 MMOL/L (ref 3.5–5.1)
PROT SERPL-MCNC: 7.4 G/DL (ref 6.4–8.2)
SODIUM SERPL-SCNC: 138 MMOL/L (ref 136–145)
TRIGL SERPL-MCNC: 59 MG/DL (ref 30–149)
VLDLC SERPL CALC-MCNC: 9 MG/DL (ref 0–30)

## 2023-11-20 PROCEDURE — 80053 COMPREHEN METABOLIC PANEL: CPT

## 2023-11-20 PROCEDURE — 80061 LIPID PANEL: CPT

## 2023-11-28 ENCOUNTER — OFFICE VISIT (OUTPATIENT)
Dept: FAMILY MEDICINE CLINIC | Facility: CLINIC | Age: 68
End: 2023-11-28
Payer: COMMERCIAL

## 2023-11-28 VITALS
HEIGHT: 64.57 IN | SYSTOLIC BLOOD PRESSURE: 120 MMHG | OXYGEN SATURATION: 100 % | DIASTOLIC BLOOD PRESSURE: 62 MMHG | RESPIRATION RATE: 16 BRPM | WEIGHT: 114 LBS | BODY MASS INDEX: 19.23 KG/M2 | HEART RATE: 50 BPM

## 2023-11-28 DIAGNOSIS — M99.08 SOMATIC DYSFUNCTION OF CHEST WALL: Primary | ICD-10-CM

## 2023-11-28 DIAGNOSIS — M99.07 SOMATIC DYSFUNCTION OF RIGHT UPPER EXTREMITY: ICD-10-CM

## 2023-11-28 DIAGNOSIS — M99.01 SOMATIC DYSFUNCTION OF SPINE, CERVICAL: ICD-10-CM

## 2023-11-28 DIAGNOSIS — M99.07 SOMATIC DYSFUNCTION OF LEFT UPPER EXTREMITY: ICD-10-CM

## 2023-11-28 DIAGNOSIS — M99.02 SOMATIC DYSFUNCTION OF SPINE, THORACIC: ICD-10-CM

## 2023-11-28 DIAGNOSIS — Z71.85 VACCINE COUNSELING: ICD-10-CM

## 2023-11-28 DIAGNOSIS — M99.03 SOMATIC DYSFUNCTION OF SPINE, LUMBAR: ICD-10-CM

## 2023-11-28 PROCEDURE — 1160F RVW MEDS BY RX/DR IN RCRD: CPT | Performed by: FAMILY MEDICINE

## 2023-11-28 PROCEDURE — 3074F SYST BP LT 130 MM HG: CPT | Performed by: FAMILY MEDICINE

## 2023-11-28 PROCEDURE — 3008F BODY MASS INDEX DOCD: CPT | Performed by: FAMILY MEDICINE

## 2023-11-28 PROCEDURE — 98927 OSTEOPATH MANJ 5-6 REGIONS: CPT | Performed by: FAMILY MEDICINE

## 2023-11-28 PROCEDURE — 3078F DIAST BP <80 MM HG: CPT | Performed by: FAMILY MEDICINE

## 2023-11-28 PROCEDURE — 1159F MED LIST DOCD IN RCRD: CPT | Performed by: FAMILY MEDICINE

## 2023-12-27 ENCOUNTER — LAB ENCOUNTER (OUTPATIENT)
Dept: LAB | Age: 68
End: 2023-12-27
Attending: FAMILY MEDICINE
Payer: MEDICARE

## 2023-12-27 DIAGNOSIS — R79.89 ELEVATED LIVER FUNCTION TESTS: ICD-10-CM

## 2023-12-27 LAB
ALBUMIN SERPL-MCNC: 3.9 G/DL (ref 3.4–5)
ALP LIVER SERPL-CCNC: 81 U/L
ALT SERPL-CCNC: 23 U/L
AST SERPL-CCNC: 35 U/L (ref 15–37)
BILIRUB DIRECT SERPL-MCNC: 0.2 MG/DL (ref 0–0.2)
BILIRUB SERPL-MCNC: 0.8 MG/DL (ref 0.1–2)
HAV IGM SER QL: NONREACTIVE
HBV CORE IGM SER QL: NONREACTIVE
HBV SURFACE AG SERPL QL IA: NONREACTIVE
HCV AB SERPL QL IA: NONREACTIVE
PROT SERPL-MCNC: 7.9 G/DL (ref 6.4–8.2)

## 2023-12-27 PROCEDURE — 80074 ACUTE HEPATITIS PANEL: CPT

## 2023-12-27 PROCEDURE — 80076 HEPATIC FUNCTION PANEL: CPT

## 2024-01-08 ENCOUNTER — OFFICE VISIT (OUTPATIENT)
Dept: FAMILY MEDICINE CLINIC | Facility: CLINIC | Age: 69
End: 2024-01-08
Payer: COMMERCIAL

## 2024-01-08 VITALS
RESPIRATION RATE: 16 BRPM | HEIGHT: 64.5 IN | HEART RATE: 65 BPM | WEIGHT: 112 LBS | DIASTOLIC BLOOD PRESSURE: 68 MMHG | BODY MASS INDEX: 18.89 KG/M2 | SYSTOLIC BLOOD PRESSURE: 112 MMHG | OXYGEN SATURATION: 97 %

## 2024-01-08 DIAGNOSIS — Z86.010 HISTORY OF COLON POLYPS: ICD-10-CM

## 2024-01-08 DIAGNOSIS — M99.02 SOMATIC DYSFUNCTION OF SPINE, THORACIC: ICD-10-CM

## 2024-01-08 DIAGNOSIS — M99.03 SOMATIC DYSFUNCTION OF SPINE, LUMBAR: ICD-10-CM

## 2024-01-08 DIAGNOSIS — M99.07 SOMATIC DYSFUNCTION OF RIGHT UPPER EXTREMITY: ICD-10-CM

## 2024-01-08 DIAGNOSIS — J84.9 INTERSTITIAL LUNG DISEASE (HCC): Primary | ICD-10-CM

## 2024-01-08 DIAGNOSIS — M99.08 SOMATIC DYSFUNCTION OF CHEST WALL: ICD-10-CM

## 2024-01-09 NOTE — PROGRESS NOTES
Mary Robledo is a 68 year old female.  HPI:   PT is here for follow up. Would like to review recent blood work.  She would like manipulation as well.   She still has good days and bothersome days but not as bad as last year.  Patient states she had a colonoscopy which had 2 polyps-1 hyperplastic and 1 adenomatous.  She was advised to have a colonoscopy in 7 years per Dr. Jones.  Patient did recently have elevated liver enzymes and did blood work recently.    Current Outpatient Medications   Medication Sig Dispense Refill    Multiple Vitamins-Minerals (MULTI COMPLETE/IRON) Oral Tab Take by mouth.      ATORVASTATIN 10 MG Oral Tab TAKE 1 TABLET(10 MG) BY MOUTH EVERY NIGHT 90 tablet 1    Wheat Dextrin (BENEFIBER OR) Take by mouth. Once daily      Flaxseed, Linseed, (GROUND FLAX SEEDS OR) Take by mouth. Every other day.      clobetasol 0.05 % External Cream Apply 1 Application. topically 2 (two) times daily as needed. 30 g 1    Cholecalciferol (VITAMIN D3) 1000 units Oral Cap       Omega-3 Fatty Acids (FISH OIL) 1000 MG Oral Cap Take 1,000 mg by mouth daily.        Allergies   Allergen Reactions    Sulfa Drugs Cross Reactors      Many years ago, thinks was rash       Past Medical History:   Diagnosis Date    Amenorrhea 2004    Anemia     Anxiety     Arthritis     Broken leg 1999    Diverticulosis     Dysmenorrhea Until menopause 2004    Dyspareunia Menopause 2004    Frozen shoulder     H/O bone scan 05/12/2014    osteopenia    H1N1 influenza 2009    History of pneumonia as a child     Hyperlipidemia     Lichen sclerosus     Osteopenia     Pap smear for cervical cancer screening 5-7-2010,6-2-2011, 6-,4-,6-3-2015    wnl neg hpv 2014    Pneumonia due to organism Age 10    Raynaud's disease     Urinary incontinence     Vestibulitis, vulvar 2010    Vulvodynia 2010      Social History:  Social History     Socioeconomic History    Marital status:    Tobacco Use    Smoking status: Never    Smokeless tobacco:  Never   Vaping Use    Vaping Use: Never used   Substance and Sexual Activity    Alcohol use: No    Drug use: No    Sexual activity: Never     Partners: Male   Other Topics Concern    Caffeine Concern Yes     Comment: occas green tea, occas chocolate, occas soda     Exercise Yes     Comment: treadmill daily    Seat Belt Yes        Results for orders placed or performed in visit on 12/27/23   Hepatic Function Panel (7) [E]   Result Value Ref Range    AST 35 15 - 37 U/L    ALT 23 13 - 56 U/L    Alkaline Phosphatase 81 55 - 142 U/L    Bilirubin, Total 0.8 0.1 - 2.0 mg/dL    Bilirubin, Direct 0.2 0.0 - 0.2 mg/dL    Total Protein 7.9 6.4 - 8.2 g/dL    Albumin 3.9 3.4 - 5.0 g/dL   Hepatitis Panel, Acute (4) [E]   Result Value Ref Range    Hepatitis A Ab, IgM Nonreactive Nonreactive     Hepatitis B Core Ab, IgM Nonreactive Nonreactive     Hepatitis B Surface Antigen Nonreactive Nonreactive     Hepatitis C Virus Nonreactive Nonreactive        REVIEW OF SYSTEMS:   GENERAL: feels well otherwise  LUNGS: denies shortness of breath with exertion  CARDIOVASCULAR: denies chest pain on exertion; chest wall discomfort  MUSCULOSKELETAL:  back pain; shoudler discomfort  EXTREMITIES:  No pain or numbness    EXAM:   /68 (BP Location: Left arm, Patient Position: Sitting, Cuff Size: adult)   Pulse 65   Resp 16   Ht 5' 4.5\" (1.638 m)   Wt 112 lb (50.8 kg)   SpO2 97%   BMI 18.93 kg/m²   GENERAL: well developed, well nourished,in no apparent distress  LUNGS: clear to auscultation  CARDIO: RRR without murmur  EXTREMITIES: no cyanosis, clubbing or edema  MUSCULOSKELETA      Paraspinals: tight paraspinals right greater than left in thoracic and lumbar region - stretches   done  Chest wall -- release done  Shoulder blades -- release done bilaterally  Levator scapulae left -- indirect done       ASSESSMENT AND PLAN:     Encounter Diagnoses   Name Primary?    Interstitial lung disease (HCC) Yes    History of colon polyps     Somatic  dysfunction of spine, thoracic     Somatic dysfunction of spine, lumbar     Somatic dysfunction of right upper extremity     Somatic dysfunction of chest wall          No orders of the defined types were placed in this encounter.      Meds & Refills for this Visit:  Requested Prescriptions      No prescriptions requested or ordered in this encounter       Imaging & Consults:  None    Manip x 4 done today.  Focus on posture.  Advised massage.  Cont. Manip.  Colonoscopy due in December 2030.  Recent blood work reviewed with the patient today.  Monitor symptoms.  Vaccines are UTD.    The patient indicates understanding of these issues and agrees to the plan.  The patient is asked to return in Return in about 2 months (around 3/8/2024) for manipulation.  .

## 2024-02-29 ENCOUNTER — TELEPHONE (OUTPATIENT)
Facility: CLINIC | Age: 69
End: 2024-02-29

## 2024-02-29 NOTE — TELEPHONE ENCOUNTER
Pt called. States she's feeling fine, hasn't been sick. She is hoping Dr. Head would be ok with pt rescheduling appt and PFT until September. Pt has PFT scheduled for March but was told the machine is broken and wouldn't be able to get it done until April. Pt made aware per Dr. Head's note: \"Plan to repeat Pfts in six months to reassess.\" Pt had PFT done in 8/2023. Please advise.

## 2024-03-04 DIAGNOSIS — Z79.899 ON STATIN THERAPY: ICD-10-CM

## 2024-03-04 DIAGNOSIS — E78.00 PURE HYPERCHOLESTEROLEMIA: ICD-10-CM

## 2024-03-04 RX ORDER — ATORVASTATIN CALCIUM 10 MG/1
TABLET, FILM COATED ORAL
Qty: 90 TABLET | Refills: 1 | Status: SHIPPED | OUTPATIENT
Start: 2024-03-04

## 2024-03-04 NOTE — TELEPHONE ENCOUNTER
Last office visit: 1/8/24   Protocol: Pass  Requested medication(s) are due for refill today: Yes  Requested medication(s) are on the active medication list same strength, form, dose/ sig: Yes  Requested medication(s) are managed by provider: Yes  Patient has already received a courtsey refill: No    NOV: 6/7/24

## 2024-03-20 ENCOUNTER — RT VISIT (OUTPATIENT)
Dept: RESPIRATORY THERAPY | Facility: HOSPITAL | Age: 69
End: 2024-03-20
Attending: INTERNAL MEDICINE
Payer: MEDICARE

## 2024-03-20 DIAGNOSIS — J84.9 ILD (INTERSTITIAL LUNG DISEASE) (HCC): ICD-10-CM

## 2024-03-20 PROCEDURE — 94060 EVALUATION OF WHEEZING: CPT | Performed by: INTERNAL MEDICINE

## 2024-03-20 PROCEDURE — 94729 DIFFUSING CAPACITY: CPT | Performed by: INTERNAL MEDICINE

## 2024-03-20 PROCEDURE — 94726 PLETHYSMOGRAPHY LUNG VOLUMES: CPT | Performed by: INTERNAL MEDICINE

## 2024-03-21 NOTE — PROCEDURES
Pulmonary Function Test:   Findings:  Spirometry: FEV1 is 1.62 L, 67% predicted. FVC is 2.20 L, 75% predicted and FEV1/ FVC ratio is 0.69.  There is no significant bronchodilator response after administration of albuterol.   The flow-volume loop demonstrates an obstructive pattern. M  Lung Volumes:                                                                     The TLC is 3.90 L, 78% predicted.  The residual volume 1.72 L, 83% predicted.  Diffusion Capacity:  The diffusion capacity is 13.74 or 71% predicted and an 5% predicted when corrected for alveolar volume.     Impression:  There is mild airway obstruction process on spirometry and visualized on flow-volume loop.  There is no significant bronchodilator response, but this does not preclude treatment with bronchodilators..     There is mild restriction with total lung capacity of 3.92 L, 78% predicted. This can be seen in heart failure, fluid overload states, interstitial lung disease, thoracic spine/chest disorders, obesity as well as other clinical scenarios.  Further clinical correlation required. ERV is reduced likely due to chest wall restriction with obesity      Diffusion capacity is mildly reduced  improves to normal range when considering alveolar volume. This may represent a normal finding but can be seen in emphysema, interstitial lung disease, pulmonary vascular disease (such as pulmonary hypertension), atelectasis and anemia. If not already performed, would suggest further evaluation as determined clinically.    Last study was performed on 8/7/2023 and in comparison there is an improvement in   FEV1,  (previously 1.47 L, 65% predicted)    DLCO diffusion capacity is unchanged (previously DLCO 72% predicted).    Disclaimer: This PFT has been interpreted based on Z-score/LLN/ULN criteria as described in ATS guidelines 2022.  Steve Hill MD

## 2024-05-01 ENCOUNTER — TELEMEDICINE (OUTPATIENT)
Facility: CLINIC | Age: 69
End: 2024-05-01
Payer: COMMERCIAL

## 2024-05-01 DIAGNOSIS — R91.8 MULTIPLE PULMONARY NODULES: ICD-10-CM

## 2024-05-01 DIAGNOSIS — J47.9 BRONCHIECTASIS WITHOUT COMPLICATION (HCC): ICD-10-CM

## 2024-05-01 DIAGNOSIS — J84.9 ILD (INTERSTITIAL LUNG DISEASE) (HCC): Primary | ICD-10-CM

## 2024-05-01 PROCEDURE — 1160F RVW MEDS BY RX/DR IN RCRD: CPT | Performed by: INTERNAL MEDICINE

## 2024-05-01 PROCEDURE — 99214 OFFICE O/P EST MOD 30 MIN: CPT | Performed by: INTERNAL MEDICINE

## 2024-05-01 PROCEDURE — 1159F MED LIST DOCD IN RCRD: CPT | Performed by: INTERNAL MEDICINE

## 2024-05-01 NOTE — PATIENT INSTRUCTIONS
Obtain a CT chest to check the lungs. Call central scheduling at [phone] to schedule the CT scan   Call/message with questions/concerns

## 2024-05-01 NOTE — PROGRESS NOTES
[facility] General Pulmonary Progress Note    History of Present Illness:  Mary Robledo is a 68 year old female never smoker with PMH raynauds who was seen today for follow up of possible ILD and bronchiectasis. Since last visit she feels well. Denies any cough, dyspnea or pain. No fevers  Previous mid chest pain has resolved.     September 2023 previously   She denies acute concerns. She does c/o ongoing chest pain with palpation; has been ongoing for several months, being treated for costochondritis and planning accupuncture soon.   No dyspnea, cough, wheeze. No f/c/s    March 2023 previously  She had previously been evaluated for possible myeloma and had a CT chest showing lung nodules. She was first evaluated at [facility] with Dr. Vera then at [facility] with Dr. Fitzgerald and obtained a follow up CT chest. The repeat CT chest demonstrated concern for possible ILD and bronchiectasis leading to her evaluation here. She denies new concerns today. No cough, dyspnea. No fevers. No weight changes.   Does have a hx of raynauds and previously seen by Dr. Abbott with abnormal YOLANDA but repeat YOLANDA was unrevealing so did not follow up.   Exercises daily  Retired, worked as , no exposures to dust, chemicals, fumes, asbestos or TB  Dog at home. No other pets    Past Medical History:   Past Medical History:    Amenorrhea    Anemia    Anxiety    Arthritis    Broken leg    Diverticulosis    Dysmenorrhea    Dyspareunia    Frozen shoulder    H/O bone scan    osteopenia    H1N1 influenza    History of pneumonia as a child    Hyperlipidemia    Lichen sclerosus    Osteopenia    Pap smear for cervical cancer screening    wnl neg hpv 2014    Pneumonia due to organism    Raynaud's disease    Urinary incontinence    Vestibulitis, vulvar    Vulvodynia        Past Surgical History:   Past Surgical History:   Procedure Laterality Date    Biopsy of breast, needle core  01/01/2002    Colonoscopy  2003 & 2013    Colposcopy, cervix w/upper adjacent vagina;  w/biopsy(s), cervix   &     Cryocautery of cervix   &     Kirill biopsy stereo nodule 1 site left (cpt=19081)      Kirill biopsy stereo nodule 2 site bilat (cpt=19081/78481)       benign    Nail removal  ,       1984, ,     Other surgical history      cryosurgeries x 2    Other surgical history      stereotactic biopsy         Family Medical History:   Family History   Problem Relation Age of Onset    Heart Disorder Father         cardiac stent    Hypertension Father     Lipids Father     Other (Other) Father         nephrolithiasis    Dementia Father     Hypertension Mother     Other (Other) Mother         dementia    Dementia Mother     Diabetes Maternal Grandmother         No    Heart Disorder Maternal Grandmother         No    Hypertension Maternal Grandmother         No    Other (Other) Maternal Grandmother         leukemia    Diabetes Maternal Grandfather     Heart Disorder Maternal Grandfather     Hypertension Maternal Grandfather     Stroke Maternal Grandfather     Diabetes Paternal Grandmother     Other (Other) Paternal Grandmother         dementia, glaucoma    Diabetes Paternal Grandfather         No    Heart Disorder Paternal Grandfather         MI?    Other (Other) Brother         parkinsons    Asthma Brother     Breast Cancer Maternal Cousin Female         her  50's    Breast Cancer Paternal Cousin Female         her40's    Other (Other) Other         leukemia    Heart Disorder Maternal Grandfather         No    Hypertension Maternal Grandfather         Not known    Cancer Sister     Depression Brother         Social History:   Social History     Socioeconomic History    Marital status:      Spouse name: Not on file    Number of children: Not on file    Years of education: Not on file    Highest education level: Not on file   Occupational History    Not on file   Tobacco Use    Smoking status: Never    Smokeless tobacco: Never   Vaping Use    Vaping  status: Never Used   Substance and Sexual Activity    Alcohol use: No    Drug use: No    Sexual activity: Never     Partners: Male   Other Topics Concern     Service Not Asked    Blood Transfusions Not Asked    Caffeine Concern Yes     Comment: occas green tea, occas chocolate, occas soda     Occupational Exposure Not Asked    Hobby Hazards Not Asked    Sleep Concern Not Asked    Stress Concern Not Asked    Weight Concern Not Asked    Special Diet Not Asked    Back Care Not Asked    Exercise Yes     Comment: treadmill daily    Bike Helmet Not Asked    Seat Belt Yes    Self-Exams Not Asked   Social History Narrative    Not on file     Social Determinants of Health     Financial Resource Strain: Not on file   Food Insecurity: Not on file   Transportation Needs: Not on file   Physical Activity: Not on file   Stress: Not on file   Social Connections: Not on file   Housing Stability: Not on file        Medications:   Current Outpatient Medications   Medication Sig Dispense Refill    atorvastatin 10 MG Oral Tab TAKE 1 TABLET(10 MG) BY MOUTH EVERY NIGHT 90 tablet 1    Multiple Vitamins-Minerals (MULTI COMPLETE/IRON) Oral Tab Take by mouth.      Wheat Dextrin (BENEFIBER OR) Take by mouth. Once daily      Flaxseed, Linseed, (GROUND FLAX SEEDS OR) Take by mouth. Every other day.      clobetasol 0.05 % External Cream Apply 1 Application. topically 2 (two) times daily as needed. 30 g 1    Cholecalciferol (VITAMIN D3) 1000 units Oral Cap       Omega-3 Fatty Acids (FISH OIL) 1000 MG Oral Cap Take 1,000 mg by mouth daily.         Review of Systems: Review of Systems   Constitutional: Negative.    HENT: Negative.     Respiratory: Negative.     Cardiovascular: Negative.    All other systems reviewed and are negative.       Physical Exam:  There were no vitals taken for this visit.     Constitutional: alert, cooperative. No acute distress.  HEENT: Head NC/AT.      WBC: 4.2, done on 5/3/2023.  HGB: 12.6, done on  5/3/2023.  PLT: 217, done on 5/3/2023.     Glucose: 91, done on 11/20/2023.  Cr: 0.86, done on 11/20/2023.  GFR(AA): 84, done on 5/18/2022.  GFR (non-AA): 73, done on 5/18/2022.  CA: 9.4, done on 11/20/2023.  Na: 138, done on 11/20/2023.  K: 4, done on 11/20/2023.  Cl: 106, done on 11/20/2023.  CO2: 26, done on 11/20/2023.  Last ALB was 3.9% done on 12/27/2023.     No results found.     Assessment/Plan:  #1. ILD  Findings noted incidentally on CT chest in 2018  9/2021 CT chest with minimal peripheral reticular opacities mainly in BUL. 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  Advised to obtain HRCT to assess  May need bronch/biopsy for further eval    #2. bronchiectasis  As above  Asymptomatic, advised to call if develops symptoms to start airway clearance/ flutter valve    #3. Pulmonary nodules  As above; stable. No need for further CT imaging    Trevor Head MD  5/1/2024    This visit is conducted using Telemedicine with live, interactive video and audio.    Patient has been referred to the [facility] access hospital website at [email or web address] to review the yearly Consent to Treat document.    Patient understands and accepts financial responsibility for any deductible, co-insurance and/or co-pays associated with this service.

## 2024-05-06 ENCOUNTER — HOSPITAL ENCOUNTER (OUTPATIENT)
Dept: CT IMAGING | Facility: HOSPITAL | Age: 69
Discharge: HOME | End: 2024-05-06
Attending: INTERNAL MEDICINE
Payer: MEDICARE

## 2024-05-06 DIAGNOSIS — J84.9 ILD (INTERSTITIAL LUNG DISEASE) (HCC): ICD-10-CM

## 2024-05-06 PROCEDURE — 71250 CT THORAX DX C-: CPT | Performed by: INTERNAL MEDICINE

## 2024-05-09 ENCOUNTER — TELEPHONE (OUTPATIENT)
Facility: CLINIC | Age: 69
End: 2024-05-09

## 2024-05-09 DIAGNOSIS — J84.9 ILD (INTERSTITIAL LUNG DISEASE) (HCC): Primary | ICD-10-CM

## 2024-05-09 NOTE — TELEPHONE ENCOUNTER
Called/reviewed HRCT results in detail with patient showing no change to previous CT imaging.   Discussed in detail with patient. She has no symptom changes. Will plan to follow with annual PFTs next due March/April 2025    Trevor Head MD

## 2024-06-01 DIAGNOSIS — E78.00 PURE HYPERCHOLESTEROLEMIA: ICD-10-CM

## 2024-06-01 DIAGNOSIS — Z79.899 ON STATIN THERAPY: ICD-10-CM

## 2024-06-03 RX ORDER — ATORVASTATIN CALCIUM 10 MG/1
TABLET, FILM COATED ORAL
Qty: 90 TABLET | Refills: 1 | Status: SHIPPED | OUTPATIENT
Start: 2024-06-03

## 2024-06-03 NOTE — TELEPHONE ENCOUNTER
Last office visit: 01/08/2024  Protocol: PASS    Requested medication(s) are due for refill today: Yes    Requested medication(s) are on the active medication list same strength, form, dose/ sig: Yes    Requested medication(s) are managed by provider: Yes    Patient has already received a courtsey refill: No    NOV: 06/07/24  Last Labs: 11/20/23  Asked to Return: 03/08/24

## 2024-06-07 ENCOUNTER — OFFICE VISIT (OUTPATIENT)
Dept: FAMILY MEDICINE CLINIC | Facility: CLINIC | Age: 69
End: 2024-06-07
Payer: COMMERCIAL

## 2024-06-07 VITALS
BODY MASS INDEX: 19.21 KG/M2 | HEIGHT: 64.25 IN | WEIGHT: 112.5 LBS | RESPIRATION RATE: 16 BRPM | DIASTOLIC BLOOD PRESSURE: 68 MMHG | SYSTOLIC BLOOD PRESSURE: 112 MMHG | HEART RATE: 65 BPM | OXYGEN SATURATION: 98 %

## 2024-06-07 DIAGNOSIS — Z91.09 ENVIRONMENTAL ALLERGIES: ICD-10-CM

## 2024-06-07 DIAGNOSIS — H61.21 HEARING LOSS DUE TO CERUMEN IMPACTION, RIGHT: ICD-10-CM

## 2024-06-07 DIAGNOSIS — H91.93 BILATERAL HEARING LOSS, UNSPECIFIED HEARING LOSS TYPE: ICD-10-CM

## 2024-06-07 DIAGNOSIS — G47.00 INSOMNIA, UNSPECIFIED TYPE: ICD-10-CM

## 2024-06-07 DIAGNOSIS — Z71.85 VACCINE COUNSELING: ICD-10-CM

## 2024-06-07 DIAGNOSIS — M43.16 SPONDYLOLISTHESIS OF LUMBAR REGION: ICD-10-CM

## 2024-06-07 DIAGNOSIS — Z12.31 SCREENING MAMMOGRAM FOR BREAST CANCER: ICD-10-CM

## 2024-06-07 DIAGNOSIS — E78.00 PURE HYPERCHOLESTEROLEMIA: ICD-10-CM

## 2024-06-07 DIAGNOSIS — Z13.0 SCREENING, ANEMIA, DEFICIENCY, IRON: ICD-10-CM

## 2024-06-07 DIAGNOSIS — Z79.899 MEDICATION MANAGEMENT: ICD-10-CM

## 2024-06-07 DIAGNOSIS — R07.89 CHEST WALL PAIN: ICD-10-CM

## 2024-06-07 DIAGNOSIS — Z00.00 ENCOUNTER FOR ANNUAL HEALTH EXAMINATION: Primary | ICD-10-CM

## 2024-06-07 DIAGNOSIS — K58.1 IRRITABLE BOWEL SYNDROME WITH CONSTIPATION: ICD-10-CM

## 2024-06-07 DIAGNOSIS — Z79.899 ON STATIN THERAPY: ICD-10-CM

## 2024-06-07 DIAGNOSIS — J84.9 INTERSTITIAL LUNG DISEASE (HCC): ICD-10-CM

## 2024-06-07 DIAGNOSIS — N90.4 LICHEN SCLEROSUS OF FEMALE GENITALIA: ICD-10-CM

## 2024-06-07 DIAGNOSIS — E55.9 VITAMIN D DEFICIENCY: ICD-10-CM

## 2024-06-07 DIAGNOSIS — Z00.00 LABORATORY EXAMINATION ORDERED AS PART OF A ROUTINE GENERAL MEDICAL EXAMINATION: ICD-10-CM

## 2024-06-07 DIAGNOSIS — M22.41 CHONDROMALACIA OF RIGHT PATELLA: ICD-10-CM

## 2024-06-07 DIAGNOSIS — Z12.4 SCREENING FOR MALIGNANT NEOPLASM OF CERVIX: ICD-10-CM

## 2024-06-07 DIAGNOSIS — M85.88 OSTEOPENIA OF SPINE: ICD-10-CM

## 2024-06-07 DIAGNOSIS — Z13.89 SCREENING FOR GENITOURINARY CONDITION: ICD-10-CM

## 2024-06-07 DIAGNOSIS — R91.8 MULTIPLE NODULES OF LUNG: ICD-10-CM

## 2024-06-07 DIAGNOSIS — K21.9 GASTROESOPHAGEAL REFLUX DISEASE, UNSPECIFIED WHETHER ESOPHAGITIS PRESENT: ICD-10-CM

## 2024-06-07 LAB
ALBUMIN SERPL-MCNC: 3.8 G/DL (ref 3.4–5)
ALBUMIN/GLOB SERPL: 1 {RATIO} (ref 1–2)
ALP LIVER SERPL-CCNC: 81 U/L
ALT SERPL-CCNC: 27 U/L
ANION GAP SERPL CALC-SCNC: 5 MMOL/L (ref 0–18)
AST SERPL-CCNC: 35 U/L (ref 15–37)
BASOPHILS # BLD AUTO: 0.06 X10(3) UL (ref 0–0.2)
BASOPHILS NFR BLD AUTO: 0.9 %
BILIRUB SERPL-MCNC: 0.5 MG/DL (ref 0.1–2)
BILIRUB UR QL STRIP.AUTO: NEGATIVE
BUN BLD-MCNC: 19 MG/DL (ref 9–23)
CALCIUM BLD-MCNC: 9.4 MG/DL (ref 8.5–10.1)
CHLORIDE SERPL-SCNC: 104 MMOL/L (ref 98–112)
CHOLEST SERPL-MCNC: 164 MG/DL (ref ?–200)
CLARITY UR REFRACT.AUTO: CLEAR
CO2 SERPL-SCNC: 28 MMOL/L (ref 21–32)
COLOR UR AUTO: YELLOW
CREAT BLD-MCNC: 0.96 MG/DL
EGFRCR SERPLBLD CKD-EPI 2021: 64 ML/MIN/1.73M2 (ref 60–?)
EOSINOPHIL # BLD AUTO: 0.22 X10(3) UL (ref 0–0.7)
EOSINOPHIL NFR BLD AUTO: 3.4 %
ERYTHROCYTE [DISTWIDTH] IN BLOOD BY AUTOMATED COUNT: 12 %
FASTING PATIENT LIPID ANSWER: NO
FASTING STATUS PATIENT QL REPORTED: NO
GLOBULIN PLAS-MCNC: 3.7 G/DL (ref 2.8–4.4)
GLUCOSE BLD-MCNC: 88 MG/DL (ref 70–99)
GLUCOSE UR STRIP.AUTO-MCNC: NORMAL MG/DL
HCT VFR BLD AUTO: 37.5 %
HDLC SERPL-MCNC: 84 MG/DL (ref 40–59)
HGB BLD-MCNC: 12.7 G/DL
IMM GRANULOCYTES # BLD AUTO: 0.01 X10(3) UL (ref 0–1)
IMM GRANULOCYTES NFR BLD: 0.2 %
KETONES UR STRIP.AUTO-MCNC: NEGATIVE MG/DL
LDLC SERPL CALC-MCNC: 71 MG/DL (ref ?–100)
LEUKOCYTE ESTERASE UR QL STRIP.AUTO: 75
LYMPHOCYTES # BLD AUTO: 2.46 X10(3) UL (ref 1–4)
LYMPHOCYTES NFR BLD AUTO: 37.8 %
MCH RBC QN AUTO: 34.3 PG (ref 26–34)
MCHC RBC AUTO-ENTMCNC: 33.9 G/DL (ref 31–37)
MCV RBC AUTO: 101.4 FL
MONOCYTES # BLD AUTO: 0.58 X10(3) UL (ref 0.1–1)
MONOCYTES NFR BLD AUTO: 8.9 %
NEUTROPHILS # BLD AUTO: 3.17 X10 (3) UL (ref 1.5–7.7)
NEUTROPHILS # BLD AUTO: 3.17 X10(3) UL (ref 1.5–7.7)
NEUTROPHILS NFR BLD AUTO: 48.8 %
NITRITE UR QL STRIP.AUTO: NEGATIVE
NONHDLC SERPL-MCNC: 80 MG/DL (ref ?–130)
OSMOLALITY SERPL CALC.SUM OF ELEC: 286 MOSM/KG (ref 275–295)
PH UR STRIP.AUTO: 5 [PH] (ref 5–8)
PLATELET # BLD AUTO: 215 10(3)UL (ref 150–450)
POTASSIUM SERPL-SCNC: 3.9 MMOL/L (ref 3.5–5.1)
PROT SERPL-MCNC: 7.5 G/DL (ref 6.4–8.2)
PROT UR STRIP.AUTO-MCNC: NEGATIVE MG/DL
RBC # BLD AUTO: 3.7 X10(6)UL
RBC UR QL AUTO: NEGATIVE
SODIUM SERPL-SCNC: 137 MMOL/L (ref 136–145)
SP GR UR STRIP.AUTO: 1.02 (ref 1–1.03)
TRIGL SERPL-MCNC: 41 MG/DL (ref 30–149)
TSI SER-ACNC: 2.62 MIU/ML (ref 0.36–3.74)
UROBILINOGEN UR STRIP.AUTO-MCNC: NORMAL MG/DL
VIT D+METAB SERPL-MCNC: 55.2 NG/ML (ref 30–100)
VLDLC SERPL CALC-MCNC: 6 MG/DL (ref 0–30)
WBC # BLD AUTO: 6.5 X10(3) UL (ref 4–11)

## 2024-06-07 PROCEDURE — 1159F MED LIST DOCD IN RCRD: CPT | Performed by: FAMILY MEDICINE

## 2024-06-07 PROCEDURE — 1160F RVW MEDS BY RX/DR IN RCRD: CPT | Performed by: FAMILY MEDICINE

## 2024-06-07 PROCEDURE — 99214 OFFICE O/P EST MOD 30 MIN: CPT | Performed by: FAMILY MEDICINE

## 2024-06-07 PROCEDURE — 82306 VITAMIN D 25 HYDROXY: CPT | Performed by: FAMILY MEDICINE

## 2024-06-07 PROCEDURE — 80053 COMPREHEN METABOLIC PANEL: CPT | Performed by: FAMILY MEDICINE

## 2024-06-07 PROCEDURE — 99499 UNLISTED E&M SERVICE: CPT | Performed by: FAMILY MEDICINE

## 2024-06-07 PROCEDURE — 88175 CYTOPATH C/V AUTO FLUID REDO: CPT | Performed by: FAMILY MEDICINE

## 2024-06-07 PROCEDURE — 84443 ASSAY THYROID STIM HORMONE: CPT | Performed by: FAMILY MEDICINE

## 2024-06-07 PROCEDURE — 1170F FXNL STATUS ASSESSED: CPT | Performed by: FAMILY MEDICINE

## 2024-06-07 PROCEDURE — 80061 LIPID PANEL: CPT | Performed by: FAMILY MEDICINE

## 2024-06-07 PROCEDURE — 96160 PT-FOCUSED HLTH RISK ASSMT: CPT | Performed by: FAMILY MEDICINE

## 2024-06-07 PROCEDURE — 3074F SYST BP LT 130 MM HG: CPT | Performed by: FAMILY MEDICINE

## 2024-06-07 PROCEDURE — 3008F BODY MASS INDEX DOCD: CPT | Performed by: FAMILY MEDICINE

## 2024-06-07 PROCEDURE — 85025 COMPLETE CBC W/AUTO DIFF WBC: CPT | Performed by: FAMILY MEDICINE

## 2024-06-07 PROCEDURE — 81001 URINALYSIS AUTO W/SCOPE: CPT | Performed by: FAMILY MEDICINE

## 2024-06-07 PROCEDURE — G0439 PPPS, SUBSEQ VISIT: HCPCS | Performed by: FAMILY MEDICINE

## 2024-06-07 PROCEDURE — 3078F DIAST BP <80 MM HG: CPT | Performed by: FAMILY MEDICINE

## 2024-06-07 RX ORDER — GARLIC EXTRACT 500 MG
1 CAPSULE ORAL DAILY
COMMUNITY

## 2024-06-07 NOTE — PROGRESS NOTES
Subjective:   Mary Robledo is a 68 year old female who presents for a MA (Medicare Advantage) Supervisit (Once per calendar year) and scheduled follow up of multiple significant but stable problems.   Pt. Is here for MA superivsit.  Dyslipidemia -- stable on atorvastatin; no side effects  ILD -- per pulmonary; Dr. Head  Colonoscopy due 9/2023.  Last eye exam -- 10/2023  Last dental exam -- 1/2023    History/Other:   Fall Risk Assessment:   She has been screened for Falls and is low risk.      Cognitive Assessment:   She had a completely normal cognitive assessment - see flowsheet entries     Functional Ability/Status:   Mary Robledo has some abnormal functions as listed below:  She has Vision problems based on screening of functional status.       Depression Screening (PHQ-2/PHQ-9): PHQ-2 SCORE: 0  , done 6/7/2024   If you checked off any problems, how difficult have these problems made it for you to do your work, take care of things at home, or get along with other people?: Not difficult at all    Last [place] Suicide Screening on 6/7/2024 was No Risk.     5 minutes spent screening and counseling for depression    Advanced Directives:   She has a Living Will on file in [facility]; reviewed and discussed documents with patient (and family/surrogate if present).  She does have a POA but we do NOT have it on file in [facility].    Patient has Advance Care Planning documents present in EMR. Reviewed documents with patient (and family/surrogate if present).      Patient Active Problem List   Diagnosis    Hematuria, unspecified    Pure hypercholesterolemia    Insomnia, unspecified    Hypersomnolence    Environmental allergies    Snoring    Dermatophytosis, nail    Irritable colon    Osteopenia of spine    Spondylolisthesis of lumbar region    Radiculitis    Chondromalacia of patella    Osteoarthritis, hand, primary localized, right    Calcified granuloma of lung (HCC)    Interstitial lung disease (HCC)     Bronchiectasis without acute exacerbation (HCC)    Multiple nodules of lung    ILD (interstitial lung disease) (HCC)     Allergies:  She is allergic to sulfa drugs cross reactors.    Current Medications:  Outpatient Medications Marked as Taking for the 24 encounter (Office Visit) with Kathryn Logan DO   Medication Sig    acidophilus-pectin Oral Cap Take 1 capsule by mouth daily. Florjen    ATORVASTATIN 10 MG Oral Tab TAKE 1 TABLET(10 MG) BY MOUTH EVERY NIGHT    Multiple Vitamins-Minerals (MULTI COMPLETE/IRON) Oral Tab Take by mouth.    Wheat Dextrin (BENEFIBER OR) Take by mouth. Once daily    Flaxseed, Linseed, (GROUND FLAX SEEDS OR) Take by mouth. Every other day.    clobetasol 0.05 % External Cream Apply 1 Application. topically 2 (two) times daily as needed.    Cholecalciferol (VITAMIN D3) 1000 units Oral Cap        Medical History:  She  has a past medical history of Amenorrhea (), Anemia, Anxiety, Arthritis, Broken leg (), Diverticulosis, Dysmenorrhea (Until menopause ), Dyspareunia (Menopause ), Frozen shoulder, H/O bone scan (2014), H1N1 influenza (), History of pneumonia as a child, Hyperlipidemia, Lichen sclerosus, Osteopenia, Pap smear for cervical cancer screening (2010,2011, 2012,2014,6-3-2015), Pneumonia due to organism (Age 10), Raynaud's disease, Urinary incontinence, Vestibulitis, vulvar (), and Vulvodynia ().  Surgical History:  She  has a past surgical history that includes biopsy of breast, needle core (2002); madelaine biopsy stereo nodule 2 site bilat (cpt=19081/99748); colonoscopy ( & ); cryocautery of cervix ( & ); colposcopy, cervix w/upper adjacent vagina; w/biopsy(s), cervix ( & );  (, , ); other surgical history (); other surgical history (); madelaine biopsy stereo nodule 1 site left (cpt=19081); and Nail Care (, ).   Family History:  Her family history includes Asthma in her  brother; Breast Cancer in her maternal cousin female and paternal cousin female; Cancer in her sister; Dementia in her father and mother; Depression in her brother; Diabetes in her maternal grandfather, maternal grandmother, paternal grandfather, and paternal grandmother; Heart Disorder in her father, maternal grandfather, maternal grandfather, maternal grandmother, and paternal grandfather; Hypertension in her father, maternal grandfather, maternal grandfather, maternal grandmother, and mother; Lipids in her father; Other in her brother, father, maternal grandmother, mother, paternal grandmother, and another family member; Stroke in her maternal grandfather.  Social History:  She  reports that she has never smoked. She has never used smokeless tobacco. She reports that she does not drink alcohol and does not use drugs.    Tobacco:  She has never smoked tobacco.    CAGE Alcohol Screen:   CAGE screening score of 0 on 5/31/2024, showing low risk of alcohol abuse.      Patient Care Team:  Kathryn Logan DO as PCP - General (Family Practice)  Staci Silverio MD (OBSTETRICS & GYNECOLOGY)  Gita Sherwood, ENIO as Physical Therapist    Review of Systems  GENERAL: feels well otherwise  SKIN: denies any unusual skin lesions  EYES: denies blurred vision or double vision  HEENT: denies nasal congestion, sinus pain or ST  LUNGS: denies shortness of breath with exertion  CARDIOVASCULAR: denies chest pain on exertion  GI: denies abdominal pain, denies heartburn  : denies dysuria, vaginal discharge or itching, no complaint of urinary incontinence   MUSCULOSKELETAL: denies back pain  NEURO: denies headaches  PSYCHE: denies depression or anxiety  HEMATOLOGIC: denies hx of anemia  ENDOCRINE: denies thyroid history  ALL/ASTHMA: denies hx of allergy or asthma    Objective:   Physical Exam  General Appearance:  Alert, cooperative, no distress, appears stated age   Head:  Normocephalic, without obvious abnormality, atraumatic    Eyes:  Conjunctiva/corneas clear, EOM's intact both eyes   Ears:  Normal TM's and external ear canals, both ears   Nose: Nares normal, septum midline,mucosa normal, no drainage or sinus tenderness   Throat: Lips, mucosa, and tongue normal; teeth and gums normal   Neck: Supple, symmetrical, trachea midline, no adenopathy;  thyroid: not enlarged, symmetric, no tenderness/mass/nodules; no carotid bruit or JVD   Back:   Symmetric, no curvature, ROM normal, no CVA tenderness   Lungs:   Clear to auscultation bilaterally, respirations unlabored   Heart:  Regular rate and rhythm, S1 and S2 normal, no murmur, rub, or gallop   Abdomen:   Soft, non-tender, bowel sounds active all four quadrants,  no masses, no organomegaly   Pelvic: See below   Extremities: Extremities normal, atraumatic, no cyanosis or edema   Pulses: 2+ and symmetric   Skin: Skin color, texture, turgor normal, no rashes or lesions   Lymph nodes: Cervical, supraclavicular, and axillary nodes normal   Neurologic: Normal   , Breasts:  normal appearance, no masses or tenderness, and Pelvic: cervix normal in appearance, no adnexal masses or tenderness, no cervical motion tenderness, rectovaginal septum normal, uterus normal size, shape, and consistency, vagina normal without discharge, and lichen on external genitalia, stenotic os    /68 (BP Location: Right arm, Patient Position: Sitting, Cuff Size: adult)   Pulse 65   Resp 16   Ht 5' 4.25\" (1.632 m)   Wt 112 lb 8 oz (51 kg)   SpO2 98%   BMI 19.16 kg/m²  Estimated body mass index is 19.16 kg/m² as calculated from the following:    Height as of this encounter: 5' 4.25\" (1.632 m).    Weight as of this encounter: 112 lb 8 oz (51 kg).    Medicare Hearing Assessment:   Hearing Screening    Time taken: 6/7/2024 11:32 AM  Entry User: Bel English MA  Screening Method: Finger Rub  Finger Rub Result: Pass         Visual Acuity:   Right Eye Visual Acuity: Corrected Right Eye Chart Acuity: 20/20   Left Eye  Visual Acuity: Corrected Left Eye Chart Acuity: 20/20   Both Eyes Visual Acuity: Corrected Both Eyes Chart Acuity: 20/20   Able To Tolerate Visual Acuity: Yes        Assessment & Plan:   Mary Robledo is a 68 year old female who presents for a Medicare Assessment.     1. Encounter for annual health examination (Primary)  2. Pure hypercholesterolemia  -     Lipid Panel; Future; Expected date: 06/07/2024  -     TSH W Reflex To Free T4; Future; Expected date: 06/07/2024  -     Lipid Panel; Future; Expected date: 12/01/2024  -     Lipid Panel  -     TSH W Reflex To Free T4  -     Detailed, Mod Complex (79279)  3. On statin therapy  -     Comp Metabolic Panel (14); Future; Expected date: 06/07/2024  -     Detailed, Mod Complex (23511)  4. Vitamin D deficiency  -     Vitamin D; Future; Expected date: 06/07/2024  -     Vitamin D  -     Detailed, Mod Complex (23339)  5. Interstitial lung disease (HCC)  -     Detailed, Mod Complex (08752)  6. Chest wall pain  -     Detailed, Mod Complex (71553)  7. Multiple nodules of lung  -     Detailed, Mod Complex (93287)  8. Gastroesophageal reflux disease, unspecified whether esophagitis present  -     Detailed, Mod Complex (31718)  9. Environmental allergies  -     Detailed, Mod Complex (99324)  10. Osteopenia of spine  -     Detailed, Mod Complex (09414)  11. Lichen sclerosus of female genitalia  -     Detailed, Mod Complex (35405)  12. Insomnia, unspecified type  -     Detailed, Mod Complex (47129)  13. Irritable bowel syndrome with constipation  -     Detailed, Mod Complex (96306)  14. Spondylolisthesis of lumbar region  Overview:  L4-L5  Orders:  -     Detailed, Mod Complex (27582)  15. Chondromalacia of right patella  -     Detailed, Mod Complex (97973)  16. Bilateral hearing loss, unspecified hearing loss type  -     Detailed, Mod Complex (93926)  17. Hearing loss due to cerumen impaction, right  -     Detailed, Mod Complex (90788)  18. Screening, anemia, deficiency,  iron  -     CBC With Differential With Platelet; Future; Expected date: 06/07/2024  -     CBC With Differential With Platelet  -     Detailed, Mod Complex (45512)  19. Medication management  -     Comp Metabolic Panel (14); Future; Expected date: 06/07/2024  -     Comp Metabolic Panel (14); Future; Expected date: 12/01/2024  -     Detailed, Mod Complex (08734)  20. Screening for malignant neoplasm of cervix  -     ThinPrep PAP with HPV Reflex Request B; Future; Expected date: 06/07/2024  -     ThinPrep PAP with HPV Reflex Request B  -     Detailed, Mod Complex (79367)  21. Vaccine counseling  -     Detailed, Mod Complex (41726)  22. Screening mammogram for breast cancer  -     [facility] NELLI 2D+3D SCREENING BILAT (CPT=77067/55975); Future; Expected date: 08/09/2024  -     Detailed, Mod Complex (27705)  23. Screening for genitourinary condition  -     Urinalysis, Routine; Future; Expected date: 06/07/2024  -     Urinalysis, Routine  -     Detailed, Mod Complex (30648)  24. Laboratory examination ordered as part of a routine general medical examination  -     CBC With Differential With Platelet; Future; Expected date: 06/07/2024  -     Comp Metabolic Panel (14); Future; Expected date: 06/07/2024  -     Lipid Panel; Future; Expected date: 06/07/2024  -     Vitamin D; Future; Expected date: 06/07/2024  -     TSH W Reflex To Free T4; Future; Expected date: 06/07/2024  -     Comp Metabolic Panel (14); Future; Expected date: 12/01/2024  -     Lipid Panel; Future; Expected date: 12/01/2024  -     CBC With Differential With Platelet  -     Comp Metabolic Panel (14)  -     Lipid Panel  -     Vitamin D  -     TSH W Reflex To Free T4  -     Urinalysis, Routine  -     ThinPrep PAP with HPV Reflex Request B  -     Detailed, Mod Complex (62765)    The patient indicates understanding of these issues and agrees to the plan.  Reinforced healthy diet, lifestyle, and exercise.    1. Encounter for annual health examination  Done today.    2.  Pure hypercholesterolemia  Stable; CPM  - Lipid Panel [E]; Future  - TSH W Reflex To Free T4 [E]; Future  - Lipid Panel; Future  - Lipid Panel [E]  - TSH W Reflex To Free T4 [E]  - Detailed, Mod Complex (13768)    3. On statin therapy  Stable; CPM  - Comp Metabolic Panel (14) [E]; Future    4. Vitamin D deficiency  Stable; CPM  - Vitamin D [E]; Future  - Vitamin D [E]  - Detailed, Mod Complex (66476)    5. Interstitial lung disease (HCC)  Stable; CPM    6. Chest wall pain  Stable; CPM    7. Multiple nodules of lung  Stable; CPM    8. Gastroesophageal reflux disease, unspecified whether esophagitis present  Stable; CPM  - acidophilus-pectin Oral Cap; Take 1 capsule by mouth daily. Florjen    9. Environmental allergies  Stable; CPM    10. Osteopenia of spine  Stable; CPM    11. Lichen sclerosus of female genitalia  Stable; CPM    12. Insomnia, unspecified type  Stable; CPM    13. Irritable bowel syndrome with constipation  Stable; CPM    14. Spondylolisthesis of lumbar region  Stable; CPM    15. Chondromalacia of right patella  Stable; CPM    16. Bilateral hearing loss, unspecified hearing loss type  Stable; CPM    17. Hearing loss due to cerumen impaction, right  Advised debrox and follow up to flush if not improved.    18. Screening, anemia, deficiency, iron  Stable; CPM  - CBC W Differential W Platelet [E]; Future  - CBC W Differential W Platelet [E]  - Detailed, Mod Complex (69432)    19. Medication management  Reviewed.  - Comp Metabolic Panel (14) [E]; Future  - Comp Metabolic Panel (14); Future  - Detailed, Mod Complex (56790)    20. Screening for malignant neoplasm of cervix  Done today.  - ThinPrep PAP with HPV Reflex Request; Future  - ThinPrep PAP with HPV Reflex Request  - Detailed, Mod Complex (66235)    21. Vaccine counseling  Reviewed.    22. Screening mammogram for breast cancer  Mammo ordered  - [facility] NELLI 2D+3D SCREENING BILAT (CPT=77067/56340); Future  - Detailed, Mod Complex (67074)    23. Screening  for genitourinary condition  UA ordered.  - Urinalysis, Routine [E]; Future  - Urinalysis, Routine [E]  - Detailed, Mod Complex (54689)    24. Laboratory examination ordered as part of a routine general medical examination  Labs ordered.  - CBC W Differential W Platelet [E]; Future  - Comp Metabolic Panel (14) [E]; Future  - Lipid Panel [E]; Future  - Vitamin D [E]; Future  - TSH W Reflex To Free T4 [E]; Future  - Comp Metabolic Panel (14); Future  - Lipid Panel; Future  - CBC W Differential W Platelet [E]  - Comp Metabolic Panel (14) [E]  - Lipid Panel [E]  - Vitamin D [E]  - TSH W Reflex To Free T4 [E]  - Urinalysis, Routine [E]  - ThinPrep PAP with HPV Reflex Request  - Detailed, Mod Complex (60873)        Return in 6 months (on 12/7/2024) for med check.     Kathryn Logan DO, 6/7/2024     Supplementary Documentation:   General Health:  In the past six months, have you lost more than 10 pounds without trying?: 2 - No  Has your appetite been poor?: No  Type of Diet: Balanced  How does the patient maintain a good energy level?: Appropriate Exercise;Daily Walks;Stretching  How would you describe your daily physical activity?: Moderate  How would you describe your current health state?: Good  How do you maintain positive mental well-being?: Social Interaction;Puzzles;Visiting Friends;Visiting Family  On a scale of 0 to 10, with 0 being no pain and 10 being severe pain, what is your pain level?: 3 - (Mild)  In the past six months, have you experienced urine leakage?: 0-No  At any time do you feel concerned for the safety/well-being of yourself and/or your children, in your home or elsewhere?: No  Have you had any immunizations at another office such as Influenza, Hepatitis B, Tetanus, or Pneumococcal?: No       Mary Robledo's SCREENING SCHEDULE   Tests on this list are recommended by your physician but may not be covered, or covered at this frequency, by your insurer.   Please check with your insurance carrier  before scheduling to verify coverage.   PREVENTATIVE SERVICES FREQUENCY &  COVERAGE DETAILS LAST COMPLETION DATE   Diabetes Screening    Fasting Blood Sugar /  Glucose    One screening every 12 months if never tested or if previously tested but not diagnosed with pre-diabetes   One screening every 6 months if diagnosed with pre-diabetes Lab Results   Component Value Date    GLU 91 11/20/2023        Cardiovascular Disease Screening    Lipid Panel  Cholesterol  Lipoprotein (HDL)  Triglycerides Covered every 5 years for all Medicare beneficiaries without apparent signs or symptoms of cardiovascular disease Lab Results   Component Value Date    CHOLEST 167 11/20/2023    HDL 95 (H) 11/20/2023    LDL 60 11/20/2023    TRIG 59 11/20/2023         Electrocardiogram (EKG)   Covered if needed at Welcome to Medicare, and non-screening if indicated for medical reasons -      Ultrasound Screening for Abdominal Aortic Aneurysm (AAA) Covered once in a lifetime for one of the following risk factors    Men who are 65-75 years old and have ever smoked    Anyone with a family history -     Colorectal Cancer Screening  Covered for ages 50-85; only need ONE of the following:    Colonoscopy   Covered every 10 years    Covered every 2 years if patient is at high risk or previous colonoscopy was abnormal 12/19/2023    Health Maintenance   Topic Date Due    Colorectal Cancer Screening  12/19/2030       Flexible Sigmoidoscopy   Covered every 4 years -    Fecal Occult Blood Test Covered annually -   Bone Density Screening    Bone density screening    Covered every 2 years after age 65 if diagnosed with risk of osteoporosis or estrogen deficiency.    Covered yearly for long-term glucocorticoid medication use (Steroids) Last Dexa Scan:    XR DEXA BONE DENSITOMETRY (CPT=77080) 03/27/2023      No recommendations at this time   Pap and Pelvic    Pap   Covered every 2 years for women at normal risk; Annually if at high risk 05/23/2023  No  recommendations at this time    Chlamydia Annually if high risk -  No recommendations at this time   Screening Mammogram    Mammogram     Recommend annually for all female patients aged 40 and older    One baseline mammogram covered for patients aged 35-39 08/09/2023    Health Maintenance   Topic Date Due    Mammogram  08/09/2024       Immunizations    Influenza Covered once per flu season  Please get every year 10/06/2023  No recommendations at this time    Pneumococcal Each vaccine ([id number] & [id number]) covered once after 65 Prevnar 13: -    [id number]: 01/14/2020     No recommendations at this time    Hepatitis B One screening covered for patients with certain risk factors   01/09/2019  No recommendations at this time    Tetanus Toxoid Not covered by Medicare Part B unless medically necessary (cut with metal); may be covered with your pharmacy prescription benefits -    Tetanus, Diptheria and Pertusis TD and TDaP Not covered by Medicare Part B -  No recommendations at this time    Zoster Not covered by Medicare Part B; may be covered with your pharmacy  prescription benefits 06/19/2017  No recommendations at this time

## 2024-06-07 NOTE — PATIENT INSTRUCTIONS
Mary Robledo's SCREENING SCHEDULE   Tests on this list are recommended by your physician but may not be covered, or covered at this frequency, by your insurer.   Please check with your insurance carrier before scheduling to verify coverage.   PREVENTATIVE SERVICES FREQUENCY &  COVERAGE DETAILS LAST COMPLETION DATE   Diabetes Screening    Fasting Blood Sugar /  Glucose    One screening every 12 months if never tested or if previously tested but not diagnosed with pre-diabetes   One screening every 6 months if diagnosed with pre-diabetes Lab Results   Component Value Date    GLU 91 11/20/2023        Cardiovascular Disease Screening    Lipid Panel  Cholesterol  Lipoprotein (HDL)  Triglycerides Covered every 5 years for all Medicare beneficiaries without apparent signs or symptoms of cardiovascular disease Lab Results   Component Value Date    CHOLEST 167 11/20/2023    HDL 95 (H) 11/20/2023    LDL 60 11/20/2023    TRIG 59 11/20/2023         Electrocardiogram (EKG)   Covered if needed at Welcome to Medicare, and non-screening if indicated for medical reasons -      Ultrasound Screening for Abdominal Aortic Aneurysm (AAA) Covered once in a lifetime for one of the following risk factors   • Men who are 65-75 years old and have ever smoked   • Anyone with a family history -     Colorectal Cancer Screening  Covered for ages 50-85; only need ONE of the following:    Colonoscopy   Covered every 10 years    Covered every 2 years if patient is at high risk or previous colonoscopy was abnormal 12/19/2023    Health Maintenance   Topic Date Due   • Colorectal Cancer Screening  12/19/2030       Flexible Sigmoidoscopy   Covered every 4 years -    Fecal Occult Blood Test Covered annually -   Bone Density Screening    Bone density screening    Covered every 2 years after age 65 if diagnosed with risk of osteoporosis or estrogen deficiency.    Covered yearly for long-term glucocorticoid medication use (Steroids) Last Dexa  Scan:    XR DEXA BONE DENSITOMETRY (CPT=77080) 03/27/2023      No recommendations at this time   Pap and Pelvic    Pap   Covered every 2 years for women at normal risk; Annually if at high risk 05/23/2023  No recommendations at this time    Chlamydia Annually if high risk -  No recommendations at this time   Screening Mammogram    Mammogram     Recommend annually for all female patients aged 40 and older    One baseline mammogram covered for patients aged 35-39 08/09/2023    Health Maintenance   Topic Date Due   • Mammogram  08/09/2024       Immunizations    Influenza Covered once per flu season  Please get every year 10/06/2023  No recommendations at this time    Pneumococcal Each vaccine ([id number] & [id number]) covered once after 65 Prevnar 13: -    [id number]: 01/14/2020     No recommendations at this time    Hepatitis B One screening covered for patients with certain risk factors   01/09/2019  No recommendations at this time    Tetanus Toxoid Not covered by Medicare Part B unless medically necessary (cut with metal); may be covered with your pharmacy prescription benefits -    Tetanus, Diptheria and Pertusis TD and TDaP Not covered by Medicare Part B -  No recommendations at this time    Zoster Not covered by Medicare Part B; may be covered with your pharmacy  prescription benefits 06/19/2017  No recommendations at this time

## 2024-06-09 ENCOUNTER — APPOINTMENT (OUTPATIENT)
Dept: GENERAL RADIOLOGY | Age: 69
End: 2024-06-09
Attending: NURSE PRACTITIONER
Payer: MEDICARE

## 2024-06-09 ENCOUNTER — HOSPITAL ENCOUNTER (OUTPATIENT)
Age: 69
Discharge: HOME | End: 2024-06-09
Payer: MEDICARE

## 2024-06-09 VITALS
SYSTOLIC BLOOD PRESSURE: 128 MMHG | HEIGHT: 64 IN | WEIGHT: 112 LBS | BODY MASS INDEX: 19.12 KG/M2 | HEART RATE: 72 BPM | DIASTOLIC BLOOD PRESSURE: 80 MMHG | RESPIRATION RATE: 25 BRPM | OXYGEN SATURATION: 99 % | TEMPERATURE: 98 F

## 2024-06-09 DIAGNOSIS — J18.9 WALKING PNEUMONIA: Primary | ICD-10-CM

## 2024-06-09 DIAGNOSIS — R07.81 RIB PAIN ON RIGHT SIDE: ICD-10-CM

## 2024-06-09 DIAGNOSIS — R06.2 WHEEZING: ICD-10-CM

## 2024-06-09 PROCEDURE — 71046 X-RAY EXAM CHEST 2 VIEWS: CPT | Performed by: NURSE PRACTITIONER

## 2024-06-09 PROCEDURE — 99214 OFFICE O/P EST MOD 30 MIN: CPT | Performed by: NURSE PRACTITIONER

## 2024-06-09 PROCEDURE — 71101 X-RAY EXAM UNILAT RIBS/CHEST: CPT | Performed by: NURSE PRACTITIONER

## 2024-06-09 RX ORDER — DOXYCYCLINE HYCLATE 100 MG/1
100 CAPSULE ORAL 2 TIMES DAILY
Qty: 14 CAPSULE | Refills: 0 | Status: SHIPPED | OUTPATIENT
Start: 2024-06-09 | End: 2024-06-16

## 2024-06-09 RX ORDER — BENZONATATE 100 MG/1
100 CAPSULE ORAL 3 TIMES DAILY PRN
Qty: 30 CAPSULE | Refills: 0 | Status: SHIPPED | OUTPATIENT
Start: 2024-06-09 | End: 2024-07-09

## 2024-06-09 NOTE — ED PROVIDER NOTES
Patient Seen in: [facility]      History     Chief Complaint   Patient presents with    Pain     Stated Complaint: Rib pain    Subjective:   HPI    68-year-old female presents today with complaints of right anterior rib pain that started on Thursday after she was walking 2 dogs.  Patient states that they were pulling.  Patient states that she saw her primary care provider on Friday for a Medicare physical and did not mention it.  Patient states that Saturday was starting her little bit more.  Patient states that today now she has pain with deep inspiration.  Patient states that she does have interstitial lung disease and denies any known pneumonia exposure.    Objective:   Past Medical History:    Amenorrhea    Anemia    Anxiety    Arthritis    Broken leg    Diverticulosis    Dysmenorrhea    Dyspareunia    Frozen shoulder    H/O bone scan    osteopenia    H1N1 influenza    History of pneumonia as a child    Hyperlipidemia    Lichen sclerosus    Osteopenia    Pap smear for cervical cancer screening    wnl neg hpv     Pneumonia due to organism    Raynaud's disease    Urinary incontinence    Vestibulitis, vulvar    Vulvodynia              Past Surgical History:   Procedure Laterality Date    Biopsy of breast, needle core  2002    Colonoscopy   &     Colposcopy, cervix w/upper adjacent vagina; w/biopsy(s), cervix   &     Cryocautery of cervix   &     Kirill biopsy stereo nodule 1 site left (cpt=19081)      Kirill biopsy stereo nodule 2 site bilat (cpt=19081/50843)       benign    Nail removal  ,       1984, ,     Other surgical history      cryosurgeries x 2    Other surgical history      stereotactic biopsy                Social History     Socioeconomic History    Marital status:    Tobacco Use    Smoking status: Never    Smokeless tobacco: Never   Vaping Use    Vaping status: Never Used   Substance and Sexual Activity    Alcohol  use: No    Drug use: No    Sexual activity: Never     Partners: Male   Other Topics Concern    Caffeine Concern Yes     Comment: occas green tea, occas chocolate, occas soda     Exercise Yes     Comment: treadmill daily    Seat Belt Yes              Review of Systems   Constitutional: Negative.    HENT: Negative.     Eyes: Negative.    Cardiovascular:         Right-sided rib pain.   Gastrointestinal: Negative.    Endocrine: Negative.    Genitourinary: Negative.    Musculoskeletal: Negative.    Skin: Negative.    Allergic/Immunologic: Negative.    Neurological: Negative.    Hematological: Negative.    Psychiatric/Behavioral: Negative.         Positive for stated complaint: Rib pain  Other systems are as noted in HPI.  Constitutional and vital signs reviewed.      All other systems reviewed and negative except as noted above.    Physical Exam     ED Triage Vitals   BP 06/09/24 1202 128/80   Pulse 06/09/24 1153 (P) 72   Resp 06/09/24 1153 (P) 25   Temp 06/09/24 1153 (P) 97.7 °F (36.5 °C)   Temp src 06/09/24 1153 (P) Temporal   SpO2 06/09/24 1153 (P) 99 %   O2 Device 06/09/24 1153 (P) None (Room air)       Current Vitals:   Vital Signs  BP: 128/80            Physical Exam  Vitals and nursing note reviewed.   Constitutional:       Appearance: Normal appearance. She is normal weight.   HENT:      Head: Normocephalic.      Right Ear: External ear normal.      Left Ear: External ear normal.      Nose: Nose normal.   Eyes:      Extraocular Movements: Extraocular movements intact.      Conjunctiva/sclera: Conjunctivae normal.      Pupils: Pupils are equal, round, and reactive to light.   Cardiovascular:      Rate and Rhythm: Normal rate and regular rhythm.      Pulses: Normal pulses.      Heart sounds: Normal heart sounds.   Pulmonary:      Effort: Pulmonary effort is normal.      Breath sounds: Wheezing present.      Comments: Slight inspiratory wheeze appreciated to the right side.  Tenderness to palpation over the border  of the right anterior lower ribs.  Negative flailing.  Abdominal:      General: Abdomen is flat. Bowel sounds are normal.      Palpations: Abdomen is soft.   Musculoskeletal:         General: Normal range of motion.      Cervical back: Normal range of motion and neck supple.   Skin:     General: Skin is warm and dry.   Neurological:      Mental Status: She is alert.   Psychiatric:         Mood and Affect: Mood normal.             ED Course   Labs Reviewed - No data to display                   MDM      68-year-old female presents today with complaints of right anterior rib pain that started on Thursday after she was walking 2 dogs.  Patient states that they were pulling.  Patient states that she saw her primary care provider on Friday for a Medicare physical and did not mention it.  Patient states that Saturday was starting her little bit more.  Patient states that today now she has pain with deep inspiration.  Patient states that she does have interstitial lung disease and denies any known pneumonia exposure.  Vital signs: Please see EMR.  Physical exam: Please see exam.  Differential diagnosis: Fracture of rib, rib sprain, pneumonia.  XR CHEST PA + LAT CHEST (CPT=71046)    Result Date: 6/9/2024  CONCLUSION:  1. Wedge-shaped area of opacity right lateral upper lobe does appear increased as compared to prior chest CTs allowing for differences in the modalities.  If indicated repeat CT would be more specific. 2. There is biapical pleural parenchymal scarring.   LOCATION:  [facility]   Dictated by (CST): Sergio Delgado MD on 6/09/2024 at 12:36 PM     Finalized by (CST): Sergio Delgado MD on 6/09/2024 at 12:37 PM       XR RIBS WITH CHEST (3 VIEWS), RIGHT  (CPT=71101)    Result Date: 6/9/2024  CONCLUSION:  1. There are reticular densities in lungs similar to previous study with the new area of more confluent opacity right upper lobe which could represent pneumonia superimposed on chronic postinflammatory scarring.  Chest CT  would be more specific. 2. There is no rib fracture detected.    LOCATION:  [facility]     Dictated by (CST): Sergio Delgado MD on 6/09/2024 at 12:21 PM     Finalized by (CST): Sergio Delgado MD on 6/09/2024 at 12:24 PM      I have personally reviewed the chest x-ray and agree that the opacity to the right upper lobe appears increased from prior studies.  Based on patient's history of interstitial lung disease, adventitious breath sound on the affected side and chest discomfort on the affected side will treat with doxycycline and Tessalon.  Patient instructed to follow-up with her primary care provider and/or pulmonologist in the next week.  ED precautions given.  Note to Patient  The 21st Century Cures Act makes medical notes like these available to patients in the interest of transparency. However, be advised this is a medical document and is intended as [id number] communication; it is written in medical language and may appear blunt, direct, or contain abbreviations or verbiage that are unfamiliar. Medical documents are intended to carry relevant information, facts as evident, and the clinical opinion of the practitioner.     This report has been produced using speech recognition software, and may contain errors related to grammar, punctuation, spelling, words or phrases unrecognized or not translated appropriately to text; these errors may be referred to the dictating provider for further clarification and/or addendum as needed.                                     Medical Decision Making  68-year-old female presents today with complaints of right anterior rib pain that started on Thursday after she was walking 2 dogs.  Patient states that they were pulling.  Patient states that she saw her primary care provider on Friday for a Medicare physical and did not mention it.  Patient states that Saturday was starting her little bit more.  Patient states that today now she has pain with deep inspiration.  Patient states  that she does have interstitial lung disease and denies any known pneumonia exposure.    Problems Addressed:  Rib pain on right side: acute illness or injury  Walking pneumonia: acute illness or injury  Wheezing: acute illness or injury    Amount and/or Complexity of Data Reviewed  External Data Reviewed: radiology.  Radiology: ordered. Decision-making details documented in ED Course.    Risk  Prescription drug management.        Disposition and Plan     Clinical Impression:  1. Walking pneumonia    2. Rib pain on right side    3. Wheezing         Disposition:  Discharge  6/9/2024 12:50 pm    Follow-up:  Kathryn Logan DO  [address] [facility] [address]  [facility] [address]  [phone]    In 1 week            Medications Prescribed:  Current Discharge Medication List        START taking these medications    Details   doxycycline 100 MG Oral Cap Take 1 capsule (100 mg total) by mouth 2 (two) times daily for 7 days.  Qty: 14 capsule, Refills: 0      benzonatate 100 MG Oral Cap Take 1 capsule (100 mg total) by mouth 3 (three) times daily as needed for cough.  Qty: 30 capsule, Refills: 0

## 2024-06-09 NOTE — DISCHARGE INSTRUCTIONS
Increase water intake 2-3 liters daily   Please follow-up with your pulmonologist and/or primary care provider.

## 2024-06-09 NOTE — ED INITIAL ASSESSMENT (HPI)
Pt was walking the dogs 4 days ago when she noted they were pulling on the leashes and then started with some rt sided anterior rib pain that is tender to touch or if she takes a deep breath

## 2024-06-10 ENCOUNTER — TELEPHONE (OUTPATIENT)
Dept: FAMILY MEDICINE CLINIC | Facility: CLINIC | Age: 69
End: 2024-06-10

## 2024-06-10 NOTE — TELEPHONE ENCOUNTER
Pain in ribs brought her to urgent care yesterday. Saw Dr. KAIA Dyer.   Walked dogs who pulled her quite a bit. Maybe that was cause.   Urgent Care Dx'd walking pneumonia. Only symptom is rib pain. CXR showed. Put on Doxy and bezonatate. She's not sure why as she has no cough and no symptoms.   Really feels fine.   Hx of costochondritis.   When should she follow up? They said 7 days. Is she able to carry on as usual since no symptoms?

## 2024-06-10 NOTE — TELEPHONE ENCOUNTER
Take abx just in cease.Does not need tessalon if no cough.  Otherwise, how are they symptoms today?

## 2024-06-11 LAB
.: NORMAL
.: NORMAL

## 2024-06-17 ENCOUNTER — OFFICE VISIT (OUTPATIENT)
Dept: FAMILY MEDICINE CLINIC | Facility: CLINIC | Age: 69
End: 2024-06-17
Payer: COMMERCIAL

## 2024-06-17 VITALS
OXYGEN SATURATION: 97 % | RESPIRATION RATE: 16 BRPM | DIASTOLIC BLOOD PRESSURE: 88 MMHG | BODY MASS INDEX: 19.12 KG/M2 | HEIGHT: 64 IN | SYSTOLIC BLOOD PRESSURE: 124 MMHG | WEIGHT: 112 LBS | HEART RATE: 75 BPM | TEMPERATURE: 99 F

## 2024-06-17 DIAGNOSIS — H61.21 IMPACTED CERUMEN OF RIGHT EAR: Primary | ICD-10-CM

## 2024-06-17 PROCEDURE — 1160F RVW MEDS BY RX/DR IN RCRD: CPT | Performed by: NURSE PRACTITIONER

## 2024-06-17 PROCEDURE — 3079F DIAST BP 80-89 MM HG: CPT | Performed by: NURSE PRACTITIONER

## 2024-06-17 PROCEDURE — 3008F BODY MASS INDEX DOCD: CPT | Performed by: NURSE PRACTITIONER

## 2024-06-17 PROCEDURE — 1159F MED LIST DOCD IN RCRD: CPT | Performed by: NURSE PRACTITIONER

## 2024-06-17 PROCEDURE — 99213 OFFICE O/P EST LOW 20 MIN: CPT | Performed by: NURSE PRACTITIONER

## 2024-06-17 PROCEDURE — 3074F SYST BP LT 130 MM HG: CPT | Performed by: NURSE PRACTITIONER

## 2024-06-17 NOTE — PROGRESS NOTES
CHIEF COMPLAINT:     Chief Complaint   Patient presents with    Ear Problem     R ear clogged   OTC Debrox       HPI:   Mary Robledo is a 68 year old female who presents to clinic today with complaints of right ear fullness. Has had for 1  week. Described as full. Denies ear pain. History of ear wax.  Associated symptoms: decreased hearing. Denies fever, chills, nasal congestion, cough. Denies hearing loss, ear drainage.   Patient denies use of Q-tips to clean the ears.  Home treatment includes debrox.     Current Outpatient Medications   Medication Sig Dispense Refill    benzonatate 100 MG Oral Cap Take 1 capsule (100 mg total) by mouth 3 (three) times daily as needed for cough. 30 capsule 0    acidophilus-pectin Oral Cap Take 1 capsule by mouth daily. Florjen      ATORVASTATIN 10 MG Oral Tab TAKE 1 TABLET(10 MG) BY MOUTH EVERY NIGHT 90 tablet 1    Multiple Vitamins-Minerals (MULTI COMPLETE/IRON) Oral Tab Take by mouth.      Wheat Dextrin (BENEFIBER OR) Take by mouth. Once daily      Flaxseed, Linseed, (GROUND FLAX SEEDS OR) Take by mouth. Every other day.      clobetasol 0.05 % External Cream Apply 1 Application. topically 2 (two) times daily as needed. 30 g 1    Cholecalciferol (VITAMIN D3) 1000 units Oral Cap       Omega-3 Fatty Acids (FISH OIL) 1000 MG Oral Cap Take 1,000 mg by mouth daily. (Patient not taking: Reported on 6/7/2024)        Past Medical History:    Amenorrhea    Anemia    Anxiety    Arthritis    Broken leg    Diverticulosis    Dysmenorrhea    Dyspareunia    Frozen shoulder    H/O bone scan    osteopenia    H1N1 influenza    History of pneumonia as a child    Hyperlipidemia    Lichen sclerosus    Osteopenia    Pap smear for cervical cancer screening    wnl neg hpv 2014    Pneumonia due to organism    Raynaud's disease    Urinary incontinence    Vestibulitis, vulvar    Vulvodynia      Social History:  Social History     Socioeconomic History    Marital status:    Tobacco Use     Smoking status: Never    Smokeless tobacco: Never   Vaping Use    Vaping status: Never Used   Substance and Sexual Activity    Alcohol use: No    Drug use: No    Sexual activity: Never     Partners: Male   Other Topics Concern    Caffeine Concern Yes     Comment: occas green tea, occas chocolate, occas soda     Exercise Yes     Comment: treadmill daily    Seat Belt Yes        REVIEW OF SYSTEMS:   GENERAL: Feeling well otherwise.    SKIN: no unusual skin lesions or rashes  HEENT: See HPI  LUNGS: No cough, shortness of breath, or wheezing.  CARDIOVASCULAR: No chest pain, palpitations  GI: No N/V/C/D.  NEURO: denies headaches or dizziness    EXAM:   /88   Pulse 75   Temp 98.6 °F (37 °C)   Resp 16   Ht 5' 4\" (1.626 m)   Wt 112 lb (50.8 kg)   SpO2 97%   BMI 19.22 kg/m²   GENERAL: well developed, well nourished,in no apparent distress  SKIN: no rashes,no suspicious lesions  HEAD: atraumatic, normocephalic  EYES: conjunctiva clear, EOM intact  EARS: Tragus non tender on palpation bilaterally. External auditory canals healthy. Right cerumen impaction.   Right TM: gray, no bulging, no retraction,  no effusion, bony landmarks intact.   Left TM: gray, no bulging, no retraction,no effusion, bony landmarks intact.  NOSE: nostrils patent, no nasal discharge, nasal mucosa pink and noninflamed  THROAT: oral mucosa pink, moist. Posterior pharynx is not erythematous or injected. No exudates.  NECK: supple, non-tender  LUNGS: clear to auscultation bilaterally, no wheezes or rhonchi. No diminished breath sounds. Breathing is non labored.  CARDIO: RRR without murmur  EXTREMITIES: no cyanosis, clubbing or edema  LYMPH: no auricular lymphadenopathy; no cervical lymphadenopathy.      Cerumen Removal Procedure  Patient gave verbal consent.  Risks and Benefits of removal were discussed with the patient, who agreed to proceed with procedure.  Location: right ear  Indication TM not visible, local itching, fullness, decreased  hearaing.  Prep Hydrogen Peroxide  Patient Status: Tolerated well.  No complications      ASSESSMENT AND PLAN:   Mary Robledo is a 68 year old female who presents with:    ASSESSMENT:  Encounter Diagnosis   Name Primary?    Impacted cerumen of right ear Yes     1. Impacted cerumen of right ear  resolved    PLAN: Meds and instructions as listed below.   Comfort measures as described in Patient Instructions.    Risks, benefits, and side effects of medication explained and discussed.  Follow up with PCP if s/sx worsen, do not improve in 3 days, or if fever of 100.4 or greater persists for 72 hrs.    Requested Prescriptions      No prescriptions requested or ordered in this encounter       See pt handout    Patient voiced understanding and is in agreement with treatment plan

## 2024-06-24 ENCOUNTER — OFFICE VISIT (OUTPATIENT)
Dept: FAMILY MEDICINE CLINIC | Facility: CLINIC | Age: 69
End: 2024-06-24

## 2024-06-24 VITALS
WEIGHT: 113.13 LBS | HEART RATE: 89 BPM | BODY MASS INDEX: 19.31 KG/M2 | DIASTOLIC BLOOD PRESSURE: 66 MMHG | HEIGHT: 64 IN | SYSTOLIC BLOOD PRESSURE: 108 MMHG | OXYGEN SATURATION: 98 % | RESPIRATION RATE: 16 BRPM

## 2024-06-24 DIAGNOSIS — M99.02 SOMATIC DYSFUNCTION OF SPINE, THORACIC: ICD-10-CM

## 2024-06-24 DIAGNOSIS — J84.9 INTERSTITIAL LUNG DISEASE (HCC): ICD-10-CM

## 2024-06-24 DIAGNOSIS — M99.03 SOMATIC DYSFUNCTION OF SPINE, LUMBAR: ICD-10-CM

## 2024-06-24 DIAGNOSIS — M99.08 SOMATIC DYSFUNCTION OF RIB: Primary | ICD-10-CM

## 2024-06-24 DIAGNOSIS — R07.89 CHEST WALL PAIN: ICD-10-CM

## 2024-06-25 NOTE — PROGRESS NOTES
Mary Robledo is a 68 year old female.  HPI:   Patient is here for follow-up on walking pneumonia.  She is not convinced that she had pneumonia.  She states she has had no symptoms.  She complains of pinpoint dependent tenderness underneath her right breast.  She states that the person she saw at the urgent care told her that that has to be her lungs and not chest wall pain.  She states that the discomfort is still there and has not changed with the antibiotics.  Patient has a history of rib wall dysfunction and costochondritis.  She states that the discomfort started after her daughter's dogs pulling her.     Current Outpatient Medications   Medication Sig Dispense Refill    acidophilus-pectin Oral Cap Take 1 capsule by mouth daily. Florjen      ATORVASTATIN 10 MG Oral Tab TAKE 1 TABLET(10 MG) BY MOUTH EVERY NIGHT 90 tablet 1    Multiple Vitamins-Minerals (MULTI COMPLETE/IRON) Oral Tab Take by mouth.      Wheat Dextrin (BENEFIBER OR) Take by mouth. Once daily      Flaxseed, Linseed, (GROUND FLAX SEEDS OR) Take by mouth. Every other day.      clobetasol 0.05 % External Cream Apply 1 Application. topically 2 (two) times daily as needed. 30 g 1    Cholecalciferol (VITAMIN D3) 1000 units Oral Cap       Omega-3 Fatty Acids (FISH OIL) 1000 MG Oral Cap Take 1,000 mg by mouth daily. (Patient not taking: Reported on 6/7/2024)        Allergies   Allergen Reactions    Sulfa Drugs Cross Reactors      Many years ago, thinks was rash       Past Medical History:    Amenorrhea    Anemia    Anxiety    Arthritis    Broken leg    Diverticulosis    Dysmenorrhea    Dyspareunia    Frozen shoulder    H/O bone scan    osteopenia    H1N1 influenza    History of pneumonia as a child    Hyperlipidemia    Lichen sclerosus    Osteopenia    Pap smear for cervical cancer screening    wnl neg hpv 2014    Pneumonia due to organism    Raynaud's disease    Urinary incontinence    Vestibulitis, vulvar    Vulvodynia      Social History:  Social  History     Socioeconomic History    Marital status:    Tobacco Use    Smoking status: Never    Smokeless tobacco: Never   Vaping Use    Vaping status: Never Used   Substance and Sexual Activity    Alcohol use: No    Drug use: No    Sexual activity: Never     Partners: Male   Other Topics Concern    Caffeine Concern Yes     Comment: occas green tea, occas chocolate, occas soda     Exercise Yes     Comment: treadmill daily    Seat Belt Yes        Results for orders placed or performed in visit on 06/07/24   Comp Metabolic Panel (14) [E]   Result Value Ref Range    Glucose 88 70 - 99 mg/dL    Sodium 137 136 - 145 mmol/L    Potassium 3.9 3.5 - 5.1 mmol/L    Chloride 104 98 - 112 mmol/L    CO2 28.0 21.0 - 32.0 mmol/L    Anion Gap 5 0 - 18 mmol/L    BUN 19 9 - 23 mg/dL    Creatinine 0.96 0.55 - 1.02 mg/dL    Calcium, Total 9.4 8.5 - 10.1 mg/dL    Calculated Osmolality 286 275 - 295 mOsm/kg    eGFR-Cr 64 >=60 mL/min/1.73m2    AST 35 15 - 37 U/L    ALT 27 13 - 56 U/L    Alkaline Phosphatase 81 55 - 142 U/L    Bilirubin, Total 0.5 0.1 - 2.0 mg/dL    Total Protein 7.5 6.4 - 8.2 g/dL    Albumin 3.8 3.4 - 5.0 g/dL    Globulin  3.7 2.8 - 4.4 g/dL    A/G Ratio 1.0 1.0 - 2.0    Patient Fasting for CMP? No    Lipid Panel [E]   Result Value Ref Range    Cholesterol, Total 164 <200 mg/dL    HDL Cholesterol 84 (H) 40 - 59 mg/dL    Triglycerides 41 30 - 149 mg/dL    LDL Cholesterol 71 <100 mg/dL    VLDL 6 0 - 30 mg/dL    Non HDL Chol 80 <130 mg/dL    Patient Fasting for Lipid? No    TSH W Reflex To Free T4 [E]   Result Value Ref Range    TSH 2.620 0.358 - 3.740 mIU/mL   Urinalysis, Routine [E]   Result Value Ref Range    Urine Color Yellow Yellow    Clarity Urine Clear Clear    Spec Gravity 1.024 1.005 - 1.030    Glucose Urine Normal Normal mg/dL    Bilirubin Urine Negative Negative    Ketones Urine Negative Negative mg/dL    Blood Urine Negative Negative    pH Urine 5.0 5.0 - 8.0    Protein Urine Negative Negative mg/dL     Urobilinogen Urine Normal Normal mg/dL    Nitrite Urine Negative Negative    Leukocyte Esterase Urine 75 (A) Negative    WBC Urine 6-10 (A) 0 - 5 /HPF    RBC Urine 3-5 (A) 0 - 2 /HPF    Bacteria Urine None Seen None Seen /HPF    Squamous Epi. Cells None Seen None Seen /HPF    Renal Tubular Epithelial Cells None Seen None Seen /HPF    Transitional Cells None Seen None Seen /HPF    Yeast Urine None Seen None Seen /HPF   Vitamin D, 25-Hydroxy   Result Value Ref Range    Vitamin D, 25OH, Total 55.2 30.0 - 100.0 ng/mL   Image-Guided Pap Smear ([facility])   Result Value Ref Range    Diagnosis:        NEGATIVE FOR INTRAEPITHELIAL LESION OR MALIGNANCY.  CELLULAR CHANGES ASSOCIATED WITH ATROPHY ARE PRESENT.    Specimen Adequacy: Comment     Performed By: Comment     . .     Note: Comment     Test Methodology: Comment     Clinician provided ICD10: Comment    ThinPrep PAP with HPV Reflex Request   Result Value Ref Range    Thin Prep Pap AP TEST REFLEXED    CBC W/ DIFFERENTIAL   Result Value Ref Range    WBC 6.5 4.0 - 11.0 x10(3) uL    RBC 3.70 (L) 3.80 - 5.30 x10(6)uL    HGB 12.7 12.0 - 16.0 g/dL    HCT 37.5 35.0 - 48.0 %    .0 150.0 - 450.0 10(3)uL    .4 (H) 80.0 - 100.0 fL    MCH 34.3 (H) 26.0 - 34.0 pg    MCHC 33.9 31.0 - 37.0 g/dL    RDW 12.0 %    Neutrophil Absolute Prelim 3.17 1.50 - 7.70 x10 (3) uL    Neutrophil Absolute 3.17 1.50 - 7.70 x10(3) uL    Lymphocyte Absolute 2.46 1.00 - 4.00 x10(3) uL    Monocyte Absolute 0.58 0.10 - 1.00 x10(3) uL    Eosinophil Absolute 0.22 0.00 - 0.70 x10(3) uL    Basophil Absolute 0.06 0.00 - 0.20 x10(3) uL    Immature Granulocyte Absolute 0.01 0.00 - 1.00 x10(3) uL    Neutrophil % 48.8 %    Lymphocyte % 37.8 %    Monocyte % 8.9 %    Eosinophil % 3.4 %    Basophil % 0.9 %    Immature Granulocyte % 0.2 %       REVIEW OF SYSTEMS:   GENERAL: feels well otherwise  LUNGS: denies shortness of breath with exertion  CARDIOVASCULAR: denies chest pain on exertion  MUSCULOSKELETAL:   right rib pain  EXTREMITIES:  No pain or numbness    EXAM:   /66 (BP Location: Left arm, Patient Position: Sitting, Cuff Size: adult)   Pulse 89   Resp 16   Ht 5' 4\" (1.626 m)   Wt 113 lb 2 oz (51.3 kg)   SpO2 98%   BMI 19.42 kg/m²   GENERAL: well developed, well nourished,in no apparent distress  LUNGS: clear to auscultation  CARDIO: RRR without murmur  EXTREMITIES: no cyanosis, clubbing or edema  MUSCULOSKELETAL:    Thoracic: HVLA   Rib 9 right -- gentle HVLA   Paraspinals: tight paraspinals right greater than left in thoracic and lumbar region - stretches done   Pain improved after manip today    ASSESSMENT AND PLAN:     Encounter Diagnoses   Name Primary?    Chest wall pain     Interstitial lung disease (HCC)     Somatic dysfunction of rib Yes    Somatic dysfunction of spine, thoracic     Somatic dysfunction of spine, lumbar        No orders of the defined types were placed in this encounter.      Meds & Refills for this Visit:  Requested Prescriptions      No prescriptions requested or ordered in this encounter       Imaging & Consults:  None  The patient indicates understanding of these issues and agrees to the plan.  The patient is asked to return in Return in about 4 weeks (around 7/22/2024), or if symptoms worsen or fail to improve, for manipulation.  .

## 2024-07-31 ENCOUNTER — OFFICE VISIT (OUTPATIENT)
Dept: FAMILY MEDICINE CLINIC | Facility: CLINIC | Age: 69
End: 2024-07-31
Payer: COMMERCIAL

## 2024-07-31 VITALS
DIASTOLIC BLOOD PRESSURE: 68 MMHG | HEIGHT: 64 IN | HEART RATE: 97 BPM | SYSTOLIC BLOOD PRESSURE: 110 MMHG | OXYGEN SATURATION: 100 % | BODY MASS INDEX: 19.55 KG/M2 | RESPIRATION RATE: 16 BRPM | WEIGHT: 114.5 LBS

## 2024-07-31 DIAGNOSIS — M54.9 TRIGGER POINT WITH BACK PAIN: ICD-10-CM

## 2024-07-31 DIAGNOSIS — J84.9 INTERSTITIAL LUNG DISEASE (HCC): ICD-10-CM

## 2024-07-31 DIAGNOSIS — M79.18 BUTTOCK PAIN: ICD-10-CM

## 2024-07-31 DIAGNOSIS — M99.08 SOMATIC DYSFUNCTION OF RIB: ICD-10-CM

## 2024-07-31 DIAGNOSIS — Z71.1 WORRIED WELL: ICD-10-CM

## 2024-07-31 DIAGNOSIS — R07.89 CHEST WALL PAIN: Primary | ICD-10-CM

## 2024-07-31 DIAGNOSIS — M79.10 TRIGGER POINT: ICD-10-CM

## 2024-07-31 DIAGNOSIS — M99.03 SOMATIC DYSFUNCTION OF SPINE, LUMBAR: ICD-10-CM

## 2024-07-31 DIAGNOSIS — M99.02 SOMATIC DYSFUNCTION OF SPINE, THORACIC: ICD-10-CM

## 2024-07-31 DIAGNOSIS — M99.04 SOMATIC DYSFUNCTION OF SPINE, SACRAL: ICD-10-CM

## 2024-07-31 PROCEDURE — 1159F MED LIST DOCD IN RCRD: CPT | Performed by: FAMILY MEDICINE

## 2024-07-31 PROCEDURE — 1170F FXNL STATUS ASSESSED: CPT | Performed by: FAMILY MEDICINE

## 2024-07-31 PROCEDURE — 1160F RVW MEDS BY RX/DR IN RCRD: CPT | Performed by: FAMILY MEDICINE

## 2024-07-31 PROCEDURE — 3074F SYST BP LT 130 MM HG: CPT | Performed by: FAMILY MEDICINE

## 2024-07-31 PROCEDURE — 99213 OFFICE O/P EST LOW 20 MIN: CPT | Performed by: FAMILY MEDICINE

## 2024-07-31 PROCEDURE — 3008F BODY MASS INDEX DOCD: CPT | Performed by: FAMILY MEDICINE

## 2024-07-31 PROCEDURE — 3078F DIAST BP <80 MM HG: CPT | Performed by: FAMILY MEDICINE

## 2024-07-31 PROCEDURE — 98926 OSTEOPATH MANJ 3-4 REGIONS: CPT | Performed by: FAMILY MEDICINE

## 2024-07-31 NOTE — PROGRESS NOTES
Mary Robledo is a 68 year old female.  HPI:   Patient is here for manipulation.  Patient states that the day after manipulation last time, unfortunate she developed pain in her left buttock.  She can localize the pain.  She states that it is worse when sitting in the car for long periods of time.  She states walking makes it better.  She feels like it is a sore muscle.  It does not radiate down her leg or into her back.  Patient also complains of upper back pain and would like manipulation today.  Patient had impacted wax in her right ear and would like me to take a look in her right ear.       Current Outpatient Medications   Medication Sig Dispense Refill    acidophilus-pectin Oral Cap Take 1 capsule by mouth daily. Florjen      ATORVASTATIN 10 MG Oral Tab TAKE 1 TABLET(10 MG) BY MOUTH EVERY NIGHT 90 tablet 1    Multiple Vitamins-Minerals (MULTI COMPLETE/IRON) Oral Tab Take by mouth.      Wheat Dextrin (BENEFIBER OR) Take by mouth. Once daily      Flaxseed, Linseed, (GROUND FLAX SEEDS OR) Take by mouth. Every other day.      clobetasol 0.05 % External Cream Apply 1 Application. topically 2 (two) times daily as needed. 30 g 1    Cholecalciferol (VITAMIN D3) 1000 units Oral Cap       Omega-3 Fatty Acids (FISH OIL) 1000 MG Oral Cap Take 1,000 mg by mouth daily.        Allergies   Allergen Reactions    Sulfa Drugs Cross Reactors      Many years ago, thinks was rash       Past Medical History:    Amenorrhea    Anemia    Anxiety    Arthritis    Broken leg    Diverticulosis    Dysmenorrhea    Dyspareunia    Frozen shoulder    H/O bone scan    osteopenia    H1N1 influenza    History of pneumonia as a child    Hyperlipidemia    Lichen sclerosus    Osteopenia    Pap smear for cervical cancer screening    wnl neg hpv 2014    Pneumonia due to organism    Raynaud's disease    Urinary incontinence    Vestibulitis, vulvar    Vulvodynia      Social History:  Social History     Socioeconomic History    Marital status:     Tobacco Use    Smoking status: Never    Smokeless tobacco: Never   Vaping Use    Vaping status: Never Used   Substance and Sexual Activity    Alcohol use: No    Drug use: No    Sexual activity: Never     Partners: Male   Other Topics Concern    Caffeine Concern Yes     Comment: occas green tea, occas chocolate, occas soda     Exercise Yes     Comment: treadmill daily    Seat Belt Yes        Results for orders placed or performed in visit on 06/07/24   Comp Metabolic Panel (14) [E]   Result Value Ref Range    Glucose 88 70 - 99 mg/dL    Sodium 137 136 - 145 mmol/L    Potassium 3.9 3.5 - 5.1 mmol/L    Chloride 104 98 - 112 mmol/L    CO2 28.0 21.0 - 32.0 mmol/L    Anion Gap 5 0 - 18 mmol/L    BUN 19 9 - 23 mg/dL    Creatinine 0.96 0.55 - 1.02 mg/dL    Calcium, Total 9.4 8.5 - 10.1 mg/dL    Calculated Osmolality 286 275 - 295 mOsm/kg    eGFR-Cr 64 >=60 mL/min/1.73m2    AST 35 15 - 37 U/L    ALT 27 13 - 56 U/L    Alkaline Phosphatase 81 55 - 142 U/L    Bilirubin, Total 0.5 0.1 - 2.0 mg/dL    Total Protein 7.5 6.4 - 8.2 g/dL    Albumin 3.8 3.4 - 5.0 g/dL    Globulin  3.7 2.8 - 4.4 g/dL    A/G Ratio 1.0 1.0 - 2.0    Patient Fasting for CMP? No    Lipid Panel [E]   Result Value Ref Range    Cholesterol, Total 164 <200 mg/dL    HDL Cholesterol 84 (H) 40 - 59 mg/dL    Triglycerides 41 30 - 149 mg/dL    LDL Cholesterol 71 <100 mg/dL    VLDL 6 0 - 30 mg/dL    Non HDL Chol 80 <130 mg/dL    Patient Fasting for Lipid? No    TSH W Reflex To Free T4 [E]   Result Value Ref Range    TSH 2.620 0.358 - 3.740 mIU/mL   Urinalysis, Routine [E]   Result Value Ref Range    Urine Color Yellow Yellow    Clarity Urine Clear Clear    Spec Gravity 1.024 1.005 - 1.030    Glucose Urine Normal Normal mg/dL    Bilirubin Urine Negative Negative    Ketones Urine Negative Negative mg/dL    Blood Urine Negative Negative    pH Urine 5.0 5.0 - 8.0    Protein Urine Negative Negative mg/dL    Urobilinogen Urine Normal Normal mg/dL    Nitrite Urine  Negative Negative    Leukocyte Esterase Urine 75 (A) Negative    WBC Urine 6-10 (A) 0 - 5 /HPF    RBC Urine 3-5 (A) 0 - 2 /HPF    Bacteria Urine None Seen None Seen /HPF    Squamous Epi. Cells None Seen None Seen /HPF    Renal Tubular Epithelial Cells None Seen None Seen /HPF    Transitional Cells None Seen None Seen /HPF    Yeast Urine None Seen None Seen /HPF   Vitamin D, 25-Hydroxy   Result Value Ref Range    Vitamin D, 25OH, Total 55.2 30.0 - 100.0 ng/mL   Image-Guided Pap Smear ([facility])   Result Value Ref Range    Diagnosis:        NEGATIVE FOR INTRAEPITHELIAL LESION OR MALIGNANCY.  CELLULAR CHANGES ASSOCIATED WITH ATROPHY ARE PRESENT.    Specimen Adequacy: Comment     Performed By: Comment     . .     Note: Comment     Test Methodology: Comment     Clinician provided ICD10: Comment    ThinPrep PAP with HPV Reflex Request   Result Value Ref Range    Thin Prep Pap AP TEST REFLEXED    CBC W/ DIFFERENTIAL   Result Value Ref Range    WBC 6.5 4.0 - 11.0 x10(3) uL    RBC 3.70 (L) 3.80 - 5.30 x10(6)uL    HGB 12.7 12.0 - 16.0 g/dL    HCT 37.5 35.0 - 48.0 %    .0 150.0 - 450.0 10(3)uL    .4 (H) 80.0 - 100.0 fL    MCH 34.3 (H) 26.0 - 34.0 pg    MCHC 33.9 31.0 - 37.0 g/dL    RDW 12.0 %    Neutrophil Absolute Prelim 3.17 1.50 - 7.70 x10 (3) uL    Neutrophil Absolute 3.17 1.50 - 7.70 x10(3) uL    Lymphocyte Absolute 2.46 1.00 - 4.00 x10(3) uL    Monocyte Absolute 0.58 0.10 - 1.00 x10(3) uL    Eosinophil Absolute 0.22 0.00 - 0.70 x10(3) uL    Basophil Absolute 0.06 0.00 - 0.20 x10(3) uL    Immature Granulocyte Absolute 0.01 0.00 - 1.00 x10(3) uL    Neutrophil % 48.8 %    Lymphocyte % 37.8 %    Monocyte % 8.9 %    Eosinophil % 3.4 %    Basophil % 0.9 %    Immature Granulocyte % 0.2 %       REVIEW OF SYSTEMS:   GENERAL: feels well otherwise  LUNGS: denies shortness of breath with exertion  CARDIOVASCULAR: denies chest pain on exertion  MUSCULOSKELETAL:  right rib pain  EXTREMITIES:  No pain or numbness    EXAM:    /68 (BP Location: Left arm, Patient Position: Sitting, Cuff Size: adult)   Pulse 97   Resp 16   Ht 5' 4\" (1.626 m)   Wt 114 lb 8 oz (51.9 kg)   SpO2 100%   BMI 19.65 kg/m²   GENERAL: well developed, well nourished,in no apparent distress  LUNGS: clear to auscultation  CARDIO: RRR without murmur  EXTREMITIES: no cyanosis, clubbing or edema  MUSCULOSKELETAL:    Trigger point in right medial scapula/rhomboid   Rib 9 right -- gentle HVLA   Paraspinals: tight paraspinals right greater than left in thoracic and lumbar region - stretches done   Left piriformis -- piriformis indirect release; trigger point left buttock       ASSESSMENT AND PLAN:     Encounter Diagnoses   Name Primary?    Chest wall pain Yes    Interstitial lung disease (HCC)     Somatic dysfunction of rib     Somatic dysfunction of spine, thoracic     Somatic dysfunction of spine, lumbar     Somatic dysfunction of spine, sacral     Worried well     Trigger point with back pain     Trigger point     Buttock pain          No orders of the defined types were placed in this encounter.      Meds & Refills for this Visit:  Requested Prescriptions      No prescriptions requested or ordered in this encounter       Imaging & Consults:  OP REFERRAL TO [facility]    Manip x 4.  Advised electric stim for trigger points -- showed one on [employer].  Right ear clear.  Advised PT for her left buttock pain -- tendinitis?      The patient indicates understanding of these issues and agrees to the plan.  The patient is asked to return in Return in about 4 weeks (around 8/28/2024) for manipulation.  .

## 2024-08-12 ENCOUNTER — HOSPITAL ENCOUNTER (OUTPATIENT)
Dept: MAMMOGRAPHY | Age: 69
Discharge: HOME | End: 2024-08-12
Attending: FAMILY MEDICINE
Payer: MEDICARE

## 2024-08-12 DIAGNOSIS — Z12.31 SCREENING MAMMOGRAM FOR BREAST CANCER: ICD-10-CM

## 2024-08-12 PROCEDURE — 77063 BREAST TOMOSYNTHESIS BI: CPT | Performed by: FAMILY MEDICINE

## 2024-08-12 PROCEDURE — 77067 SCR MAMMO BI INCL CAD: CPT | Performed by: FAMILY MEDICINE

## 2024-08-27 ENCOUNTER — TELEPHONE (OUTPATIENT)
Dept: PHYSICAL THERAPY | Facility: HOSPITAL | Age: 69
End: 2024-08-27

## 2024-08-28 ENCOUNTER — OFFICE VISIT (OUTPATIENT)
Dept: PHYSICAL THERAPY | Age: 69
End: 2024-08-28
Attending: FAMILY MEDICINE
Payer: MEDICARE

## 2024-08-28 DIAGNOSIS — M54.9 TRIGGER POINT WITH BACK PAIN: Primary | ICD-10-CM

## 2024-08-28 DIAGNOSIS — M79.10 TRIGGER POINT: ICD-10-CM

## 2024-08-28 DIAGNOSIS — M79.18 BUTTOCK PAIN: ICD-10-CM

## 2024-08-28 PROCEDURE — 97161 PT EVAL LOW COMPLEX 20 MIN: CPT

## 2024-08-28 PROCEDURE — 97110 THERAPEUTIC EXERCISES: CPT

## 2024-08-28 NOTE — PROGRESS NOTES
SPINE EVALUATION:     Diagnosis:   Trigger point with back pain, buttock pain      Referring Provider: Kathryn Logan  Date of Evaluation:    8/28/2024    Precautions:  Drug Allergy Next MD visit:   none scheduled  Date of Surgery: n/a     PATIENT SUMMARY   Mary Robledo is a 68 year old female who presents to therapy today with complaints of R buttock pain, chronic upper back, mid back pain for 20 months. Pt had a choking episode Dec [id number] symptoms were severe after that. PT treatment 10 months ago was helpful in decreasing symptoms. Acupuncture was helpful but pain has returned.    Pt describes pain level current 5/10, at best 3/10, at worst 9/10.   Current functional limitations include pain with lifting, reaching, twisting, breathing, exercising.     Mary describes prior level of function pain on/off for the past 20 months, prior to that pain free. Pt goals include to decrease ant chest pain, thoracic back pain, R buttock pain.  Past medical history was reviewed with Mary. Significant findings include Osteopenia, Interstitial lung disease, Hypercholesteremia,   Pt denies diplopia, dysarthria, dysphasia, dizziness, drop attacks, bowel/bladder changes, saddle anesthesia, and ALECIA LE N/T.    ASSESSMENT  Mary presents to physical therapy evaluation with primary c/o pain in R buttock, sternum and mid thoracic spine. The results of the objective tests and measures show thoracic spine levoscoliosis, asymmetry of shoulders, head tilt, marked tenderness to palpation over thoracic vertebrae and ant chest at sternocostal joints.  Functional deficits include but are not limited to pain with daily activities and exercising.  Signs and symptoms are consistent with diagnosis of scoliosis, thoracic spine dysfunction, ? Costochondritis. Pt and PT discussed evaluation findings, pathology, POC and HEP.  Pt voiced understanding and performs HEP correctly without reported pain. Skilled Physical Therapy is medically necessary to  address the above impairments and reach functional goals.     OBJECTIVE:   Observation/Posture: R shoulder higher, mild R rib hump with forward flexion of spine  Neuro Screen: Intact    Lumbar spine AROM: (* denotes performed with pain)  Flexion: reach to floor, R rib hump (scoliosis thoracic spine)  Extension: min loss  Sidebending: R min loss; L no loss  Rotation: R no loss; L no loss    Accessory motion: Tenderness with PA to T spine, L spine vertebrae  Palpation: Tenderness R proximal hamstring insertion onto isch tuberosity                     Tenderness over sternum and sternocostal joints    Strength: Upper extremity 4/5                   Lower extremity 5/5    Flexibility:   UE/Scapular LE   Upper Trap: moderate tightness  Levator Scap: normal  Pec Major: moderate tightness  Scalenes: moderate tightness Hip Flexor: normal  Hamstrings: normal  Piriformis: normal  Quads: minimal tightness  Gastroc-soleus: minimal tightness     Gait: pt ambulates on level ground with normal mechanics.    Today’s Treatment and Response: Hook lying LTR, bridging, bent leg fallout, s lying hip abd, s lying clam, s lying open book stretch, wall slides sh flexion  Pt education was provided on exam findings, treatment diagnosis, treatment plan, expectations, and prognosis. Pt was also provided recommendations for postural corrections and importance of remaining active  Patient was instructed in and issued a HEP for: Above exercises sent to pt by email [employer]    Charges: PT Eval Low Complexity, EX 1      Total Timed Treatment: 15 min     Total Treatment Time: 45 min     PLAN OF CARE:    Goals: (to be met in 8 visits)   Decrease pain R buttock to enable pt to walk and sit comfortably without pain  Decrease pain in sternum and mid thoracic spine to enable pt to lift daily objects up to 15#  Instruct pt in daily stretches to decrease thoracic spine tightness    Frequency / Duration: Patient will be seen for 2 x/week or a total of 8  visits over a 90 day period. Treatment will include: Manual Therapy, Neuromuscular Re-education, Therapeutic Exercise, and Home Exercise Program instruction    Education or treatment limitation: None  Rehab Potential:good    Oswestry Disability Index Score  Score: 24 % (8/21/2024 10:01 AM)      Patient/Family/Caregiver was advised of these findings, precautions, and treatment options and has agreed to actively participate in planning and for this course of care.    Thank you for your referral. Please co-sign or sign and return this letter via fax as soon as possible to [phone]. If you have any questions, please contact me at Dept: [phone]    Sincerely,  Electronically signed by therapist: Gita Sherwood PT    Physician's certification required: Yes  I certify the need for these services furnished under this plan of treatment and while under my care.    X___________________________________________________ Date____________________    Certification From: 8/28/2024  To:11/26/2024

## 2024-09-04 ENCOUNTER — OFFICE VISIT (OUTPATIENT)
Dept: FAMILY MEDICINE CLINIC | Facility: CLINIC | Age: 69
End: 2024-09-04
Payer: COMMERCIAL

## 2024-09-04 VITALS
BODY MASS INDEX: 19.68 KG/M2 | WEIGHT: 115.25 LBS | DIASTOLIC BLOOD PRESSURE: 70 MMHG | SYSTOLIC BLOOD PRESSURE: 108 MMHG | OXYGEN SATURATION: 100 % | HEART RATE: 72 BPM | HEIGHT: 64 IN | RESPIRATION RATE: 16 BRPM

## 2024-09-04 DIAGNOSIS — M99.02 SOMATIC DYSFUNCTION OF SPINE, THORACIC: ICD-10-CM

## 2024-09-04 DIAGNOSIS — R07.89 CHEST WALL PAIN: ICD-10-CM

## 2024-09-04 DIAGNOSIS — J84.9 INTERSTITIAL LUNG DISEASE (HCC): ICD-10-CM

## 2024-09-04 DIAGNOSIS — M99.03 SOMATIC DYSFUNCTION OF SPINE, LUMBAR: ICD-10-CM

## 2024-09-04 DIAGNOSIS — M79.2 NEURALGIA: Primary | ICD-10-CM

## 2024-09-04 DIAGNOSIS — M79.18 MYOFASCIAL PAIN: ICD-10-CM

## 2024-09-04 DIAGNOSIS — M99.08 SOMATIC DYSFUNCTION OF RIB: ICD-10-CM

## 2024-09-04 PROCEDURE — 98926 OSTEOPATH MANJ 3-4 REGIONS: CPT | Performed by: FAMILY MEDICINE

## 2024-09-04 PROCEDURE — 3074F SYST BP LT 130 MM HG: CPT | Performed by: FAMILY MEDICINE

## 2024-09-04 PROCEDURE — 3008F BODY MASS INDEX DOCD: CPT | Performed by: FAMILY MEDICINE

## 2024-09-04 PROCEDURE — 1159F MED LIST DOCD IN RCRD: CPT | Performed by: FAMILY MEDICINE

## 2024-09-04 PROCEDURE — 3078F DIAST BP <80 MM HG: CPT | Performed by: FAMILY MEDICINE

## 2024-09-04 PROCEDURE — 99214 OFFICE O/P EST MOD 30 MIN: CPT | Performed by: FAMILY MEDICINE

## 2024-09-04 PROCEDURE — 1160F RVW MEDS BY RX/DR IN RCRD: CPT | Performed by: FAMILY MEDICINE

## 2024-09-04 PROCEDURE — 1170F FXNL STATUS ASSESSED: CPT | Performed by: FAMILY MEDICINE

## 2024-09-05 ENCOUNTER — OFFICE VISIT (OUTPATIENT)
Dept: PHYSICAL THERAPY | Age: 69
End: 2024-09-05
Attending: FAMILY MEDICINE
Payer: MEDICARE

## 2024-09-05 PROCEDURE — 97140 MANUAL THERAPY 1/> REGIONS: CPT

## 2024-09-05 PROCEDURE — 97110 THERAPEUTIC EXERCISES: CPT

## 2024-09-05 NOTE — PROGRESS NOTES
Diagnosis:    Trigger point with back pain, buttock pain      Referring Provider: Kathryn Logan  Date of Evaluation:   8/28/2024    Precautions:  Drug Allergy Next MD visit:   none scheduled  Date of Surgery: n/a   Insurance Primary/Secondary: [employer] / [facility]       # Auth Visits: 8   Total Timed Treatment: Manual 15 min, There ex 30 min  Date POC Expires: 11/26/2024   Total Treatment time: 45 min       Charges: MT 1, EX 2       Treatment Number: 2    Subjective: Pain and tightness ant chest and ribcage  Manipulation yesterday did not help to decrease the pain as it usually does    Pain: 5/10     Objective:  R shoulder higher, mild R rib hump with forward flexion of spine   Upper Trap: moderate tightness  Levator Scap: normal  Pec Major: moderate tightness  Scalenes: moderate tightness    Tenderness over sternum and sternocostal joints     Treatment:   UBE level 3 5 mins  Standing wall slide shoulder flexion x 10 using towel  Prone lying STM periscapular muscles 5 min                     PA mobilizations Gr 2-3 5 mins  Prone scap squeeze x 10    S lying open book stretch R/L x 10    Hook lying LTR x 10                    Bent leg fallout x 10 R/L                    Bridging x 10                    Hamstring stretch R/L 30 sec hold                      Supine with bolster behind knees passive shoulder flexion, abduction, horizontal abduction   Shoulder depression stretch   Passive cervical spine ROM s flexion, rotation,   Seated pt education, posture training      Goals:  (to be met in 8 visits)   Decrease pain R buttock to enable pt to walk and sit comfortably without pain  Decrease pain in sternum and mid thoracic spine to enable pt to lift daily objects up to 15#  Instruct pt in daily stretches to decrease thoracic spine tightness      HEP:  Hook lying LTR, bridging, bent leg fallout, s lying hip abd, s lying clam, s lying open book stretch, wall slides sh flexion     Assessment: Pt continues to  have myofascial pain in thoracic spine and ribcage -sternum. TP in periscapular muscles, reviewed breathing exercises to reduce stress and pain.    Plan: Continue POC manual therapy, stretching, posture

## 2024-09-05 NOTE — PROGRESS NOTES
Mary Robledo is a 68 year old female.  HPI:   Patient is here for manipulation.  Patient states that her chest wall pain is back and it is bad.  She notes it right mid chest and also points to her left chest and around the back.  She does recall that accupuncture helped her in the past but did not continue with it.     Current Outpatient Medications   Medication Sig Dispense Refill    acidophilus-pectin Oral Cap Take 1 capsule by mouth daily. Florjen      ATORVASTATIN 10 MG Oral Tab TAKE 1 TABLET(10 MG) BY MOUTH EVERY NIGHT 90 tablet 1    Multiple Vitamins-Minerals (MULTI COMPLETE/IRON) Oral Tab Take by mouth.      Wheat Dextrin (BENEFIBER OR) Take by mouth. Once daily      Flaxseed, Linseed, (GROUND FLAX SEEDS OR) Take by mouth. Every other day.      clobetasol 0.05 % External Cream Apply 1 Application. topically 2 (two) times daily as needed. 30 g 1    Cholecalciferol (VITAMIN D3) 1000 units Oral Cap       Omega-3 Fatty Acids (FISH OIL) 1000 MG Oral Cap Take 1,000 mg by mouth daily.        Allergies   Allergen Reactions    Sulfa Drugs Cross Reactors      Many years ago, thinks was rash       Past Medical History:    Amenorrhea    Anemia    Anxiety    Arthritis    Broken leg    Diverticulosis    Dysmenorrhea    Dyspareunia    Frozen shoulder    H/O bone scan    osteopenia    H1N1 influenza    History of pneumonia as a child    Hyperlipidemia    Lichen sclerosus    Osteopenia    Pap smear for cervical cancer screening    wnl neg hpv 2014    Pneumonia due to organism    Raynaud's disease    Urinary incontinence    Vestibulitis, vulvar    Vulvodynia      Social History:  Social History     Socioeconomic History    Marital status:    Tobacco Use    Smoking status: Never    Smokeless tobacco: Never   Vaping Use    Vaping status: Never Used   Substance and Sexual Activity    Alcohol use: No    Drug use: No    Sexual activity: Never     Partners: Male   Other Topics Concern    Caffeine Concern Yes      Comment: occas green tea, occas chocolate, occas soda     Exercise Yes     Comment: treadmill daily    Seat Belt Yes        Results for orders placed or performed in visit on 06/07/24   Comp Metabolic Panel (14) [E]   Result Value Ref Range    Glucose 88 70 - 99 mg/dL    Sodium 137 136 - 145 mmol/L    Potassium 3.9 3.5 - 5.1 mmol/L    Chloride 104 98 - 112 mmol/L    CO2 28.0 21.0 - 32.0 mmol/L    Anion Gap 5 0 - 18 mmol/L    BUN 19 9 - 23 mg/dL    Creatinine 0.96 0.55 - 1.02 mg/dL    Calcium, Total 9.4 8.5 - 10.1 mg/dL    Calculated Osmolality 286 275 - 295 mOsm/kg    eGFR-Cr 64 >=60 mL/min/1.73m2    AST 35 15 - 37 U/L    ALT 27 13 - 56 U/L    Alkaline Phosphatase 81 55 - 142 U/L    Bilirubin, Total 0.5 0.1 - 2.0 mg/dL    Total Protein 7.5 6.4 - 8.2 g/dL    Albumin 3.8 3.4 - 5.0 g/dL    Globulin  3.7 2.8 - 4.4 g/dL    A/G Ratio 1.0 1.0 - 2.0    Patient Fasting for CMP? No    Lipid Panel [E]   Result Value Ref Range    Cholesterol, Total 164 <200 mg/dL    HDL Cholesterol 84 (H) 40 - 59 mg/dL    Triglycerides 41 30 - 149 mg/dL    LDL Cholesterol 71 <100 mg/dL    VLDL 6 0 - 30 mg/dL    Non HDL Chol 80 <130 mg/dL    Patient Fasting for Lipid? No    TSH W Reflex To Free T4 [E]   Result Value Ref Range    TSH 2.620 0.358 - 3.740 mIU/mL   Urinalysis, Routine [E]   Result Value Ref Range    Urine Color Yellow Yellow    Clarity Urine Clear Clear    Spec Gravity 1.024 1.005 - 1.030    Glucose Urine Normal Normal mg/dL    Bilirubin Urine Negative Negative    Ketones Urine Negative Negative mg/dL    Blood Urine Negative Negative    pH Urine 5.0 5.0 - 8.0    Protein Urine Negative Negative mg/dL    Urobilinogen Urine Normal Normal mg/dL    Nitrite Urine Negative Negative    Leukocyte Esterase Urine 75 (A) Negative    WBC Urine 6-10 (A) 0 - 5 /HPF    RBC Urine 3-5 (A) 0 - 2 /HPF    Bacteria Urine None Seen None Seen /HPF    Squamous Epi. Cells None Seen None Seen /HPF    Renal Tubular Epithelial Cells None Seen None Seen /HPF     Transitional Cells None Seen None Seen /HPF    Yeast Urine None Seen None Seen /HPF   Vitamin D, 25-Hydroxy   Result Value Ref Range    Vitamin D, 25OH, Total 55.2 30.0 - 100.0 ng/mL   Image-Guided Pap Smear ([facility])   Result Value Ref Range    Diagnosis:        NEGATIVE FOR INTRAEPITHELIAL LESION OR MALIGNANCY.  CELLULAR CHANGES ASSOCIATED WITH ATROPHY ARE PRESENT.    Specimen Adequacy: Comment     Performed By: Comment     . .     Note: Comment     Test Methodology: Comment     Clinician provided ICD10: Comment    ThinPrep PAP with HPV Reflex Request   Result Value Ref Range    Thin Prep Pap AP TEST REFLEXED    CBC W/ DIFFERENTIAL   Result Value Ref Range    WBC 6.5 4.0 - 11.0 x10(3) uL    RBC 3.70 (L) 3.80 - 5.30 x10(6)uL    HGB 12.7 12.0 - 16.0 g/dL    HCT 37.5 35.0 - 48.0 %    .0 150.0 - 450.0 10(3)uL    .4 (H) 80.0 - 100.0 fL    MCH 34.3 (H) 26.0 - 34.0 pg    MCHC 33.9 31.0 - 37.0 g/dL    RDW 12.0 %    Neutrophil Absolute Prelim 3.17 1.50 - 7.70 x10 (3) uL    Neutrophil Absolute 3.17 1.50 - 7.70 x10(3) uL    Lymphocyte Absolute 2.46 1.00 - 4.00 x10(3) uL    Monocyte Absolute 0.58 0.10 - 1.00 x10(3) uL    Eosinophil Absolute 0.22 0.00 - 0.70 x10(3) uL    Basophil Absolute 0.06 0.00 - 0.20 x10(3) uL    Immature Granulocyte Absolute 0.01 0.00 - 1.00 x10(3) uL    Neutrophil % 48.8 %    Lymphocyte % 37.8 %    Monocyte % 8.9 %    Eosinophil % 3.4 %    Basophil % 0.9 %    Immature Granulocyte % 0.2 %       REVIEW OF SYSTEMS:   GENERAL: feels well otherwise  LUNGS: denies shortness of breath with exertion  CARDIOVASCULAR: denies chest pain on exertion  MUSCULOSKELETAL:  right rib pain  EXTREMITIES:  No pain or numbness    EXAM:   /70 (BP Location: Left arm, Patient Position: Sitting, Cuff Size: adult)   Pulse 72   Resp 16   Ht 5' 4\" (1.626 m)   Wt 115 lb 4 oz (52.3 kg)   SpO2 100%   BMI 19.78 kg/m²   GENERAL: well developed, well nourished,in no apparent distress  LUNGS: clear to  auscultation  CARDIO: RRR without murmur  EXTREMITIES: no cyanosis, clubbing or edema  MUSCULOSKELETAL:       Rib 9 right -- gentle HVLA   Paraspinals: tight paraspinals right greater than left in thoracic and lumbar region - stretches done   Sacrum wnl.          ASSESSMENT AND PLAN:     Encounter Diagnoses   Name Primary?    Neuralgia Yes    Myofascial pain     Chest wall pain     Interstitial lung disease (HCC)     Somatic dysfunction of rib     Somatic dysfunction of spine, thoracic     Somatic dysfunction of spine, lumbar     Somatic dysfunction of spine, sacral          No orders of the defined types were placed in this encounter.      Meds & Refills for this Visit:  Requested Prescriptions      No prescriptions requested or ordered in this encounter       Imaging & Consults:  ACUPUNCTURE THERAPY ([facility]) - INTERNAL REFERRAL    Manip x 4.  Advised acupuncture.  Will send info for PT with [facility].  Discussed gabapentin/pregabalin.  Monitor.          The patient indicates understanding of these issues and agrees to the plan.  The patient is asked to return in No follow-ups on file.  .

## 2024-09-06 ENCOUNTER — TELEPHONE (OUTPATIENT)
Dept: FAMILY MEDICINE CLINIC | Facility: CLINIC | Age: 69
End: 2024-09-06

## 2024-09-06 DIAGNOSIS — K21.9 GASTROESOPHAGEAL REFLUX DISEASE, UNSPECIFIED WHETHER ESOPHAGITIS PRESENT: ICD-10-CM

## 2024-09-06 DIAGNOSIS — R07.89 CHEST WALL PAIN: Primary | ICD-10-CM

## 2024-09-06 NOTE — TELEPHONE ENCOUNTER
Pt was at physical therapy yesterday and they asked if she has had an EGD before to evaluate the chest pain/burning  Pt wondering if she should see GI for workup?   Referral pended if ok, thanks!

## 2024-09-06 NOTE — TELEPHONE ENCOUNTER
That is fine but I gave her omeprazole last June and I believe she stated it made no difference to her pain.

## 2024-09-09 ENCOUNTER — TELEPHONE (OUTPATIENT)
Dept: PHYSICAL THERAPY | Facility: HOSPITAL | Age: 69
End: 2024-09-09

## 2024-09-09 ENCOUNTER — OFFICE VISIT (OUTPATIENT)
Dept: PHYSICAL THERAPY | Age: 69
End: 2024-09-09
Attending: FAMILY MEDICINE
Payer: MEDICARE

## 2024-09-09 PROCEDURE — 97110 THERAPEUTIC EXERCISES: CPT

## 2024-09-09 PROCEDURE — 97140 MANUAL THERAPY 1/> REGIONS: CPT

## 2024-09-09 NOTE — PROGRESS NOTES
Diagnosis:    Trigger point with back pain, buttock pain      Referring Provider: Kathryn Logan  Date of Evaluation:   8/28/2024    Precautions:  Drug Allergy Next MD visit:   none scheduled  Date of Surgery: n/a   Insurance Primary/Secondary: [employer] / [facility] [place]       # Auth Visits: 8   Total Timed Treatment: Manual 30 min, There ex 10 min  Date POC Expires: 11/26/2024   Total Treatment time: 40 min       Charges: MT 2, EX 1       Treatment Number: 3    Subjective: Pain and tightness ant chest and ribcage, mid back and between shoulder blades. I feel a lot of pressure in mychest and back like someone is standing on me. I don't have any difficulty with breathing. Dr Logan gave me a referral to see a Myofascial PT at [facility].    Pain: 7/10     Objective:  R shoulder higher, mild R rib hump with forward flexion of spine   Upper Trap: moderate tightness  Levator Scap: normal  Pec Major: moderate tightness  Scalenes: moderate tightness  Periscapular muscles: moderate tightness    Tenderness over sternum and sternocostal joints     Treatment:   UBE level 3 5 mins  Prone lying STM periscapular muscles 20 min  Prone passive scap retraction R/L x 10  Supine STM over sternum/costal joints and lower ribs at abdominal muscles 10 mins  Supine passive shoulder overhead flexion, abduction, horizontal adduction x 10 R/L    Hook lying LTR x 10    Goals:  (to be met in 8 visits)   Decrease pain R buttock to enable pt to walk and sit comfortably without pain  Decrease pain in sternum and mid thoracic spine to enable pt to lift daily objects up to 15#  Instruct pt in daily stretches to decrease thoracic spine tightness      HEP:  Hook lying LTR, bridging, bent leg fallout, s lying hip abd, s lying clam, s lying open book stretch, wall slides sh flexion     Assessment: Pt continues to have myofascial pain in thoracic spine and ribcage -sternum. TP in periscapular muscles, reviewed breathing exercises to reduce  stress and pain.    Plan: Continue POC manual therapy, stretching, posture

## 2024-09-11 ENCOUNTER — OFFICE VISIT (OUTPATIENT)
Dept: PHYSICAL THERAPY | Age: 69
End: 2024-09-11
Attending: FAMILY MEDICINE
Payer: MEDICARE

## 2024-09-11 PROCEDURE — 97140 MANUAL THERAPY 1/> REGIONS: CPT

## 2024-09-11 PROCEDURE — 97110 THERAPEUTIC EXERCISES: CPT

## 2024-09-11 NOTE — PROGRESS NOTES
Diagnosis:    Trigger point with back pain, buttock pain      Referring Provider: Kathryn Logan  Date of Evaluation:   8/28/2024    Precautions:  Drug Allergy Next MD visit:   none scheduled  Date of Surgery: n/a   Insurance Primary/Secondary: [employer] / [facility]       # Auth Visits: 8   Total Timed Treatment: Manual 35 min, There ex 10 min  Date POC Expires: 11/26/2024   Total Treatment time: 45 min       Charges: MT 2, EX 1       Treatment Number: 3    Subjective: Pain and tightness ant chest and ribcage, mid back and between shoulder blades. I felt relief for 2 days following the last therapy session and overall 25% less pain. I am scheduled to begin myofascial PT in November, it was recommended that I continue here until I can be seen at the other clinic.  Pressure in my chest and back is decreased. I don't have any difficulty with breathing. Dr Logan gave me a referral to see a Myofascial PT at [facility].    Pain: 5/10     Objective:  R shoulder higher, mild R rib hump with forward flexion of spine   Upper Trap: moderate tightness  Levator Scap: normal  Pec Major: moderate tightness  Scalenes: moderate tightness  Periscapular muscles: moderate tightness    Tenderness over sternum and sternocostal joints   Lower ribs are palpable positioned slightly anterior to upper ribs    Treatment:   UBE level 3 5 mins  Prone lying STM periscapular muscles 20 min  Prone passive scap retraction R/L x 10  Prone elongation of fascia lumbar spine placing caudal stretch with hands on pelvis 3 mins  Supine STM over sternum/costal joints and lower ribs at abdominal muscles 10 mins  Supine passive shoulder overhead flexion, abduction, horizontal adduction x 10 R/L  Posterior pressure on lower ribs pressing spine into tx table 2 mins    Hook lying LTR x 10    Goals:  (to be met in 8 visits)   Decrease pain R buttock to enable pt to walk and sit comfortably without pain  Decrease pain in sternum and mid  thoracic spine to enable pt to lift daily objects up to 15#  Instruct pt in daily stretches to decrease thoracic spine tightness      HEP:  Hook lying LTR, bridging, bent leg fallout, s lying hip abd, s lying clam, s lying open book stretch, wall slides sh flexion     Assessment: Pt continues to have myofascial pain in thoracic spine and ribcage -sternum. TP in periscapular muscles, reviewed breathing exercises to reduce stress and pain.    Plan: Continue POC manual therapy, stretching, posture

## 2024-09-16 ENCOUNTER — OFFICE VISIT (OUTPATIENT)
Dept: PHYSICAL THERAPY | Age: 69
End: 2024-09-16
Attending: FAMILY MEDICINE
Payer: MEDICARE

## 2024-09-16 PROCEDURE — 97140 MANUAL THERAPY 1/> REGIONS: CPT

## 2024-09-16 PROCEDURE — 97110 THERAPEUTIC EXERCISES: CPT

## 2024-09-16 NOTE — PROGRESS NOTES
Diagnosis:    Trigger point with back pain, buttock pain      Referring Provider: Kathryn Logan  Date of Evaluation:   8/28/2024    Precautions:  Drug Allergy Next MD visit:   none scheduled  Date of Surgery: n/a   Insurance Primary/Secondary: [employer] / [facility]       # Auth Visits: 8   Total Timed Treatment: Manual 35 min, There ex 10 min  Date POC Expires: 11/26/2024   Total Treatment time: 45 min       Charges: MT 2, EX 1       Treatment Number: 4    Subjective: Decreased pain and tightness ant chest and ribcage, mid back and between shoulder blades. I felt relief following the last therapy session with only 1 day when the pain was . I am scheduled to begin myofascial PT in November, it was recommended that I continue here until I can be seen at the other clinic. Dr Logan gave me a referral to see a Myofascial PT at [facility].    Pain: 4/10     Objective:  R shoulder higher, mild R rib hump with forward flexion of spine   Upper Trap: moderate tightness  Levator Scap: normal  Pec Major: moderate tightness  Scalenes: moderate tightness  Periscapular muscles: moderate tightness  Prone R scapula winging    Tenderness over sternum and sternocostal joints   Lower ribs are palpable positioned slightly anterior to upper ribs    Treatment:   UBE level 3 5 mins  Prone lying STM periscapular muscles 20 min  Prone passive scap retraction R/L x 10  Prone elongation of fascia lumbar spine placing caudal stretch with hands on pelvis 3 mins  Supine STM over sternum/costal joints and lower ribs at abdominal muscles 10 mins  Supine passive shoulder overhead flexion, abduction, horizontal adduction x 10 R/L  Posterior pressure on lower ribs pressing spine into tx table 2 mins    Hook lying LTR x 10    Goals:  (to be met in 8 visits)   Decrease pain R buttock to enable pt to walk and sit comfortably without pain  Decrease pain in sternum and mid thoracic spine to enable pt to lift daily objects up to  15#  Instruct pt in daily stretches to decrease thoracic spine tightness      HEP:  Hook lying LTR, bridging, bent leg fallout, s lying hip abd, s lying clam, s lying open book stretch, wall slides sh flexion     Assessment: Pt continues to have myofascial pain in thoracic spine and ribcage -sternum. TP in periscapular muscles, reviewed breathing exercises to reduce stress and pain.    Plan: Continue POC manual therapy, stretching, posture

## 2024-09-18 ENCOUNTER — OFFICE VISIT (OUTPATIENT)
Dept: PHYSICAL THERAPY | Age: 69
End: 2024-09-18
Attending: FAMILY MEDICINE
Payer: MEDICARE

## 2024-09-18 PROCEDURE — 97110 THERAPEUTIC EXERCISES: CPT

## 2024-09-18 PROCEDURE — 97140 MANUAL THERAPY 1/> REGIONS: CPT

## 2024-09-18 NOTE — PROGRESS NOTES
Diagnosis:    Trigger point with back pain, buttock pain      Referring Provider: Kathryn Logan  Date of Evaluation:   8/28/2024    Precautions:  Drug Allergy Next MD visit:   none scheduled  Date of Surgery: n/a   Insurance Primary/Secondary: [employer] / [facility]       # Auth Visits: 8   Total Timed Treatment: Manual 35 min, There ex 10 min  Date POC Expires: 11/26/2024   Total Treatment time: 45 min       Charges: MT 2, EX 1       Treatment Number: 5    Subjective: Tightness ant chest and ribcage today, TP pain both shoulders when I lay on my side to sleep. [place] relief following the last therapy session in the upper back, the lower ribs. I am scheduled to begin myofascial PT in November, it was recommended that I continue here until I can be seen at the other clinic. Dr Logan gave me a referral to see a Myofascial PT at [facility].    Pain: 4/10     Objective:  R shoulder higher, mild R rib hump with forward flexion of spine   Upper Trap: moderate tightness  Levator Scap: normal  Pec Major: moderate tightness  Scalenes: moderate tightness  Periscapular muscles: moderate tightness  Prone R scapula winging    Tenderness over sternum and sternocostal joints, lower ribs are palpable positioned slightly anterior to upper ribs    Treatment:   UBE level 3 5 mins  Prone lying STM periscapular muscles 20 min  Prone passive scap retraction R/L x 10  Prone elongation of fascia lumbar spine placing caudal stretch with hands on pelvis 3 mins  Supine STM over sternum/costal joints and lower ribs at abdominal muscles 10 mins  Supine passive shoulder overhead flexion, abduction, horizontal adduction x 10 R/L  Posterior pressure on lower ribs pressing spine into tx table 2 mins    Hook lying LTR x 10    Goals:  (to be met in 8 visits)   Decrease pain R buttock to enable pt to walk and sit comfortably without pain  Decrease pain in sternum and mid thoracic spine to enable pt to lift daily objects up to  15#  Instruct pt in daily stretches to decrease thoracic spine tightness      HEP:  Hook lying LTR, bridging, bent leg fallout, s lying hip abd, s lying clam, s lying open book stretch, wall slides sh flexion     Assessment: Pt continues to have myofascial pain in thoracic spine and ribcage -sternum. TP in periscapular muscles, reviewed breathing exercises to reduce stress and pain. Pt is open to Dry needling, accupuncture in the past provided some relief. Pt has an appoinment for endoscopic evaluation in October. Pt has a planned trip for 4 days to [facility] in October.     Plan: Continue POC manual therapy, stretching.

## 2024-09-22 ENCOUNTER — HOSPITAL ENCOUNTER (EMERGENCY)
Facility: HOSPITAL | Age: 69
Discharge: HOME | End: 2024-09-22
Attending: EMERGENCY MEDICINE
Payer: MEDICARE

## 2024-09-22 ENCOUNTER — APPOINTMENT (OUTPATIENT)
Dept: GENERAL RADIOLOGY | Facility: HOSPITAL | Age: 69
End: 2024-09-22
Attending: EMERGENCY MEDICINE
Payer: MEDICARE

## 2024-09-22 ENCOUNTER — TELEPHONE (OUTPATIENT)
Dept: FAMILY MEDICINE CLINIC | Facility: CLINIC | Age: 69
End: 2024-09-22

## 2024-09-22 VITALS
HEIGHT: 64 IN | SYSTOLIC BLOOD PRESSURE: 132 MMHG | HEART RATE: 71 BPM | OXYGEN SATURATION: 100 % | TEMPERATURE: 98 F | WEIGHT: 115 LBS | DIASTOLIC BLOOD PRESSURE: 65 MMHG | BODY MASS INDEX: 19.63 KG/M2 | RESPIRATION RATE: 20 BRPM

## 2024-09-22 DIAGNOSIS — R07.89 CHEST PAIN, MUSCULOSKELETAL: Primary | ICD-10-CM

## 2024-09-22 LAB
ALBUMIN SERPL-MCNC: 5.2 G/DL (ref 3.2–4.8)
ALBUMIN/GLOB SERPL: 1.7 {RATIO} (ref 1–2)
ALP LIVER SERPL-CCNC: 86 U/L
ALT SERPL-CCNC: 19 U/L
ANION GAP SERPL CALC-SCNC: 6 MMOL/L (ref 0–18)
AST SERPL-CCNC: 39 U/L (ref ?–34)
ATRIAL RATE: 83 BPM
BASOPHILS # BLD AUTO: 0.04 X10(3) UL (ref 0–0.2)
BASOPHILS NFR BLD AUTO: 0.6 %
BILIRUB SERPL-MCNC: 0.7 MG/DL (ref 0.2–1.1)
BUN BLD-MCNC: 16 MG/DL (ref 9–23)
CALCIUM BLD-MCNC: 10.4 MG/DL (ref 8.7–10.4)
CHLORIDE SERPL-SCNC: 99 MMOL/L (ref 98–112)
CO2 SERPL-SCNC: 29 MMOL/L (ref 21–32)
CREAT BLD-MCNC: 0.82 MG/DL
D DIMER PPP FEU-MCNC: 0.48 UG/ML FEU (ref ?–0.69)
EGFRCR SERPLBLD CKD-EPI 2021: 77 ML/MIN/1.73M2 (ref 60–?)
EOSINOPHIL # BLD AUTO: 0.06 X10(3) UL (ref 0–0.7)
EOSINOPHIL NFR BLD AUTO: 0.9 %
ERYTHROCYTE [DISTWIDTH] IN BLOOD BY AUTOMATED COUNT: 12 %
GLOBULIN PLAS-MCNC: 3.1 G/DL (ref 2–3.5)
GLUCOSE BLD-MCNC: 96 MG/DL (ref 70–99)
HCT VFR BLD AUTO: 38.2 %
HGB BLD-MCNC: 13.4 G/DL
IMM GRANULOCYTES # BLD AUTO: 0.01 X10(3) UL (ref 0–1)
IMM GRANULOCYTES NFR BLD: 0.2 %
LYMPHOCYTES # BLD AUTO: 1.87 X10(3) UL (ref 1–4)
LYMPHOCYTES NFR BLD AUTO: 28.9 %
MCH RBC QN AUTO: 33.8 PG (ref 26–34)
MCHC RBC AUTO-ENTMCNC: 35.1 G/DL (ref 31–37)
MCV RBC AUTO: 96.2 FL
MONOCYTES # BLD AUTO: 0.38 X10(3) UL (ref 0.1–1)
MONOCYTES NFR BLD AUTO: 5.9 %
NEUTROPHILS # BLD AUTO: 4.12 X10 (3) UL (ref 1.5–7.7)
NEUTROPHILS # BLD AUTO: 4.12 X10(3) UL (ref 1.5–7.7)
NEUTROPHILS NFR BLD AUTO: 63.5 %
OSMOLALITY SERPL CALC.SUM OF ELEC: 279 MOSM/KG (ref 275–295)
P AXIS: 64 DEGREES
P-R INTERVAL: 128 MS
PLATELET # BLD AUTO: 221 10(3)UL (ref 150–450)
POTASSIUM SERPL-SCNC: 3.7 MMOL/L (ref 3.5–5.1)
PROT SERPL-MCNC: 8.3 G/DL (ref 5.7–8.2)
Q-T INTERVAL: 390 MS
QRS DURATION: 84 MS
QTC CALCULATION (BEZET): 458 MS
R AXIS: 62 DEGREES
RBC # BLD AUTO: 3.97 X10(6)UL
SODIUM SERPL-SCNC: 134 MMOL/L (ref 136–145)
T AXIS: 48 DEGREES
TROPONIN I SERPL HS-MCNC: 9 NG/L
VENTRICULAR RATE: 83 BPM
WBC # BLD AUTO: 6.5 X10(3) UL (ref 4–11)

## 2024-09-22 PROCEDURE — 93010 ELECTROCARDIOGRAM REPORT: CPT

## 2024-09-22 PROCEDURE — 85379 FIBRIN DEGRADATION QUANT: CPT | Performed by: EMERGENCY MEDICINE

## 2024-09-22 PROCEDURE — 80053 COMPREHEN METABOLIC PANEL: CPT | Performed by: EMERGENCY MEDICINE

## 2024-09-22 PROCEDURE — 71046 X-RAY EXAM CHEST 2 VIEWS: CPT | Performed by: EMERGENCY MEDICINE

## 2024-09-22 PROCEDURE — 84484 ASSAY OF TROPONIN QUANT: CPT | Performed by: EMERGENCY MEDICINE

## 2024-09-22 PROCEDURE — 93005 ELECTROCARDIOGRAM TRACING: CPT

## 2024-09-22 PROCEDURE — 96374 THER/PROPH/DIAG INJ IV PUSH: CPT

## 2024-09-22 PROCEDURE — 85025 COMPLETE CBC W/AUTO DIFF WBC: CPT | Performed by: EMERGENCY MEDICINE

## 2024-09-22 PROCEDURE — 99285 EMERGENCY DEPT VISIT HI MDM: CPT

## 2024-09-22 RX ORDER — KETOROLAC TROMETHAMINE 15 MG/ML
15 INJECTION, SOLUTION INTRAMUSCULAR; INTRAVENOUS ONCE
Status: COMPLETED | OUTPATIENT
Start: 2024-09-22 | End: 2024-09-22

## 2024-09-22 RX ORDER — METHYLPREDNISOLONE 4 MG
TABLET, DOSE PACK ORAL
Qty: 1 EACH | Refills: 0 | Status: SHIPPED | OUTPATIENT
Start: 2024-09-22

## 2024-09-22 NOTE — TELEPHONE ENCOUNTER
Pt. Paged me this morning stating her back pain is 10/10 and different from her usual back pain.  States she has family hx of AAA and advised to be evaluated to r/o aortic dissection.  She verbalized understanding.

## 2024-09-22 NOTE — ED QUICK NOTES
PT DESCRIBES A CHEST PRESSURE OR EPIGASTRIC PAIN FOR PAST 20 MONTHS, BUT WORSE SINCE FRIDAY. PT IS SPEAKING CLEARLY. SKIN WARM AND DRY. RESPIRATIONS NON LABORED. PT HAS AN APPOINTMENT WITH GI NEXT MONTH.

## 2024-09-22 NOTE — ED PROVIDER NOTES
Patient Seen in: [facility] Emergency Department      History     Chief Complaint   Patient presents with    Chest Pain Angina     Stated Complaint: intermittent chest wall pain    Subjective:   HPI    68-year-old female here for chest pain the patient's had intermittent chest pain for 20 months she states at times it will go away but most the time feels like there is a dull pain and then more recently seem to flareup it is around the parasternal area sometimes she will feel some pain in the midthoracic area of the back this seems to come and go there is no numbness or tingling it when this first started originally she had an episode where she was eating broccoli and the broccoli was stuck and she was in inferior discomfort and show she was able to swallow it and vomit some of the broccoli up the next day is when this pain started but this was 20 months ago.  She has a history of some interstitial lung disease that was noted on imaging.  However the patient does not feel short of breath no coughing or fever she has no history of heart disease.  She works out on a regular basis does not seem to be exertional chest pain when it flares up its does worsen with movement and sometimes deep breath.  No history of pulmonary embolus or DVT.    Objective:   Past Medical History:    Amenorrhea    Anemia    Anxiety    Arthritis    Broken leg    Diverticulosis    Dysmenorrhea    Dyspareunia    Frozen shoulder    H/O bone scan    osteopenia    H1N1 influenza    History of pneumonia as a child    Hyperlipidemia    Lichen sclerosus    Osteopenia    Pap smear for cervical cancer screening    wnl neg hpv 2014    Pneumonia due to organism    Raynaud's disease    Urinary incontinence    Vestibulitis, vulvar    Vulvodynia              Past Surgical History:   Procedure Laterality Date    Biopsy of breast, needle core  01/01/2002    Colonoscopy  2003 & 2013    Colposcopy, cervix w/upper adjacent vagina; w/biopsy(s), cervix  1982 &      Cryocautery of cervix   &     Kirill biopsy stereo nodule 1 site left (cpt=19081)      Kirill biopsy stereo nodule 2 site bilat (cpt=19081/24921)       benign    Nail removal  ,       1984, ,     Other surgical history  1980    cryosurgeries x 2    Other surgical history      stereotactic biopsy                Social History     Socioeconomic History    Marital status:    Tobacco Use    Smoking status: Never    Smokeless tobacco: Never   Vaping Use    Vaping status: Never Used   Substance and Sexual Activity    Alcohol use: No    Drug use: No    Sexual activity: Never     Partners: Male   Other Topics Concern    Caffeine Concern Yes     Comment: occas green tea, occas chocolate, occas soda     Exercise Yes     Comment: treadmill daily    Seat Belt Yes              Review of Systems    Positive for stated Chief Complaint: Chest Pain Angina    Other systems are as noted in HPI.  Constitutional and vital signs reviewed.      All other systems reviewed and negative except as noted above.    Physical Exam     ED Triage Vitals [24 1222]   /77   Pulse 82   Resp 16   Temp 98.2 °F (36.8 °C)   Temp src Oral   SpO2 99 %   O2 Device None (Room air)       Current Vitals:   Vital Signs  BP: 132/65  Pulse: 71  Resp: 20  Temp: 98.2 °F (36.8 °C)  Temp src: Oral  MAP (mmHg): 85    Oxygen Therapy  SpO2: 100 %  O2 Device: None (Room air)            Physical Exam    Patient is alert orient x 3 in no acute distress HEENT exam within normal limits neck there is no lymphadenopathy or JVD lungs are clear cardiovascular Ruperto shows regular rate and rhythm without murmurs abdomen is soft and nontender at the chest wall there is some tenderness in the parasternal area.  Extremities no clubbing sinus edema skin is no rash neurologic exams normal.    ED Course     Labs Reviewed   COMP METABOLIC PANEL (14) - Abnormal; Notable for the following components:       Result Value    Sodium 134 (*)      AST 39 (*)     Total Protein 8.3 (*)     Albumin 5.2 (*)     All other components within normal limits   TROPONIN I HIGH SENSITIVITY - Normal   D-DIMER - Normal   CBC WITH DIFFERENTIAL WITH PLATELET   RAINBOW DRAW LAVENDER   RAINBOW DRAW LIGHT GREEN   RAINBOW DRAW BLUE   RAINBOW DRAW GOLD     EKG    Rate, intervals and axes as noted on EKG Report.  Rate: 83  Rhythm: Sinus Rhythm  Reading: Nonspecific ST abnormality this is possible left atrial large minute abnormal EKG                 XR CHEST PA + LAT CHEST (CPT=71046)    Result Date: 9/22/2024  CONCLUSION:  No consolidation.  No pneumothorax.   LOCATION:  [facility]   Dictated by (CST): Niranjan mSith MD on 9/22/2024 at 2:22 PM     Finalized by (CST): Niranjan Smith MD on 9/22/2024 at 2:23 PM      Images independent reviewed there is no consolidation.  Abnormal Labs Reviewed   COMP METABOLIC PANEL (14) - Abnormal; Notable for the following components:       Result Value    Sodium 134 (*)     AST 39 (*)     Total Protein 8.3 (*)     Albumin 5.2 (*)     All other components within normal limits     Labs unremarkable.         MDM      Initial differential diagnosis considered but not limited to includes ACS pulmonary embolus aortic dissection musculoskeletal chest pain GERD pleurisy      Pain has been ongoing for 20 months seems to be consistent with musculoskeletal as it is reproducible seems to come and go.  I do not think this suggest aortic dissection her D-dimer is normal no evidence of pulmonary embolus or ACS.  Advise anti-inflammatory medicine Medrol Dosepak and follow-up with primary care doctor and/or spine surgery                                   Medical Decision Making      Disposition and Plan     Clinical Impression:  1. Chest pain, musculoskeletal         Disposition:  Discharge  9/22/2024  3:12 pm    Follow-up:  Kathryn Logan DO  [address] [address] [phone]    Follow up in 1 week(s)      Jv Bhakta,  MD  [address]   [facility] [address]  [phone]    Follow up in 1 week(s)            Medications Prescribed:  Current Discharge Medication List        START taking these medications    Details   methylPREDNISolone (MEDROL) 4 MG Oral Tablet Therapy Pack Dosepack: take as directed  Qty: 1 each, Refills: 0

## 2024-09-22 NOTE — DISCHARGE INSTRUCTIONS
Take anti-inflammatory medicine such as diclofenac or Advil.  You could also take Tylenol.  Take the Medrol Dosepak as prescribed.  Use a heating pad 20 minutes 4 times a day follow-up with your doctor over the next 2 days return if worse

## 2024-09-22 NOTE — ED INITIAL ASSESSMENT (HPI)
To the ED with chest pain that started towards the end of December when she choked on some broccoli. Patient states it was very violent when it happened. Was then diagnosed with Costochondritis. Treated with motrin, steroid injections, \"adjustments\", physical therapy, and acupuncture. Symptoms come and go, from gone completely to coming right back like day one. Endorses pian to her sternum when she pushes on it, palpates it, or lays down on her back.

## 2024-09-23 ENCOUNTER — OFFICE VISIT (OUTPATIENT)
Dept: PHYSICAL THERAPY | Age: 69
End: 2024-09-23
Attending: FAMILY MEDICINE
Payer: MEDICARE

## 2024-09-23 PROCEDURE — 97112 NEUROMUSCULAR REEDUCATION: CPT

## 2024-09-23 PROCEDURE — 97110 THERAPEUTIC EXERCISES: CPT

## 2024-09-23 NOTE — PROGRESS NOTES
Diagnosis:    Trigger point with back pain, buttock pain      Referring Provider: Kathryn Logan  Date of Evaluation:   8/28/2024    Precautions:  Drug Allergy Next MD visit:   none scheduled  Date of Surgery: n/a   Insurance Primary/Secondary: [employer] / [facility]       # Auth Visits: 8   Total Timed Treatment: 54mins  Date POC Expires: 11/26/2024   Total Treatment time: 54 min       Charges: Neuro re-ed x3, therex x1       Treatment Number: 6/8    Subjective: Pt reports she went to ED yesterday due to significant pain; was concerned possible heart involvement. Had xrays which were negative. Pain better today. Reports difficulty with sleeping on her side.    Pain: 4/10     Objective:    Trunk AROM: WFL with exception of pain into R thoracic SB    Posture assessment: R 3C scoliosis with rotation with associated posture deviations with trunk deviated R, pelvis deviated L, R shoulder rounded and protruded, pelvis deviated fwd and anteriorly tilted    Breathing assessment: decreased L lateral expansion, decreased diaphragmatic breathing    Zurita test: (+) R thoracic     Treatment:     Date: 9/23/2024  TX#: 6/8 Date:       TX#:    Neuro re-ed: 42mins  Discussed 3D presentation of scoliosis and effect on pt presentation  Posture assessment at grid of sagittal and frontal planes  Breathing assessment diaphragmatic and lateral costal expansion  Seated lateral expansion then focus at L lateral expansion  Seated diaphragmatic breathing  Seated posture alignment with neutral pelvis   - then added L lateral expansion  Reviewed positioning for sleep, standing, sitting        TherEx 12mins  Review of current HEP, symptom presentation  Short hanging at stall bar with lateral expansion  L stretch at sink                Goals:  (to be met in 8 visits)   Decrease pain R buttock to enable pt to walk and sit comfortably without pain  Decrease pain in sternum and mid thoracic spine to enable pt to lift daily objects up to  15#  Instruct pt in daily stretches to decrease thoracic spine tightness  Update 9/23  4. Pt will demo normalized diaphragmatic breathing  5. Pt will demo normalized and symmetrical lateral costal expansion  6. Pt will demo full and pain free thoracic mobility      HEP:  Hook lying LTR, bridging, bent leg fallout, s lying hip abd, s lying clam, s lying open book stretch, wall slides sh flexion  9/23: diaphragmatic breathing, L lateral expansion, L stretch at sink     Assessment: Reviewed pt symptoms and functional limitations. Assessed standing posture and breathing mechanics. Noted deviations consistent with R 3C scoliosis with associated breathing dysfunction. Decreased thoracic mobility and limited separation of blocks may place increased tension at thoracic region and contribute to pt presentation.     Plan: Assess effect of HEP changes. Review posture. Review breathing mechanics and apply diaphragmatic release if indicated. Palpate thoracic mobility.

## 2024-09-25 ENCOUNTER — OFFICE VISIT (OUTPATIENT)
Dept: FAMILY MEDICINE CLINIC | Facility: CLINIC | Age: 69
End: 2024-09-25
Payer: COMMERCIAL

## 2024-09-25 ENCOUNTER — OFFICE VISIT (OUTPATIENT)
Dept: PHYSICAL THERAPY | Age: 69
End: 2024-09-25
Attending: FAMILY MEDICINE
Payer: MEDICARE

## 2024-09-25 VITALS
SYSTOLIC BLOOD PRESSURE: 128 MMHG | RESPIRATION RATE: 14 BRPM | HEIGHT: 64 IN | DIASTOLIC BLOOD PRESSURE: 78 MMHG | HEART RATE: 74 BPM | OXYGEN SATURATION: 93 % | BODY MASS INDEX: 19.52 KG/M2 | WEIGHT: 114.31 LBS

## 2024-09-25 DIAGNOSIS — M79.2 NEURALGIA: ICD-10-CM

## 2024-09-25 DIAGNOSIS — Z09 HOSPITAL DISCHARGE FOLLOW-UP: Primary | ICD-10-CM

## 2024-09-25 DIAGNOSIS — Z79.899 MEDICATION MANAGEMENT: ICD-10-CM

## 2024-09-25 DIAGNOSIS — J84.9 INTERSTITIAL LUNG DISEASE (HCC): ICD-10-CM

## 2024-09-25 DIAGNOSIS — Z71.85 VACCINE COUNSELING: ICD-10-CM

## 2024-09-25 DIAGNOSIS — R07.89 CHEST WALL PAIN: ICD-10-CM

## 2024-09-25 DIAGNOSIS — K21.9 GASTROESOPHAGEAL REFLUX DISEASE, UNSPECIFIED WHETHER ESOPHAGITIS PRESENT: ICD-10-CM

## 2024-09-25 PROCEDURE — 3078F DIAST BP <80 MM HG: CPT | Performed by: FAMILY MEDICINE

## 2024-09-25 PROCEDURE — 99214 OFFICE O/P EST MOD 30 MIN: CPT | Performed by: FAMILY MEDICINE

## 2024-09-25 PROCEDURE — 1159F MED LIST DOCD IN RCRD: CPT | Performed by: FAMILY MEDICINE

## 2024-09-25 PROCEDURE — 1170F FXNL STATUS ASSESSED: CPT | Performed by: FAMILY MEDICINE

## 2024-09-25 PROCEDURE — 1160F RVW MEDS BY RX/DR IN RCRD: CPT | Performed by: FAMILY MEDICINE

## 2024-09-25 PROCEDURE — 97112 NEUROMUSCULAR REEDUCATION: CPT

## 2024-09-25 PROCEDURE — 97140 MANUAL THERAPY 1/> REGIONS: CPT

## 2024-09-25 PROCEDURE — 3074F SYST BP LT 130 MM HG: CPT | Performed by: FAMILY MEDICINE

## 2024-09-25 PROCEDURE — 3008F BODY MASS INDEX DOCD: CPT | Performed by: FAMILY MEDICINE

## 2024-09-25 NOTE — PROGRESS NOTES
Progress Summary  Pt has attended 8 visits in Physical Therapy.     Diagnosis:    Trigger point with back pain, buttock pain      Referring Provider: Kathryn Logan  Date of Evaluation:   8/28/2024    Precautions:  Drug Allergy Next MD visit:   none scheduled  Date of Surgery: n/a   Insurance Primary/Secondary: [employer] /        # Auth Visits: 8        Treatment Number: 8/8    Subjective: Pt reports continued pain although felt a little better yesterday. Has been working on her breathing exercises and is feeling better about them. Is optimistic about improving her breathing capacity and posture.     Pain: 4/10     Assessment: Mary has been seen for 8/8 sessions in PT. She has been compliant with attendance and HEP and motivated to improve. She transferred care to me for added focus on posture deviations and is responding well to care. She presents with postural deviations consistent with R 3C scoliosis with trunk deviated R and pelvis deviated L, decreased L lateral expansion, R shoulder rounded and protruded forward. Improving posture awareness with cueing. Decreased diaphragmatic expansion although notable improvement in awareness. Moderate soft tissue restrictions along R thoracic paraspinals consistent with convexity. Pt reported significant improvement in symptoms with manual release to paraspinals and diaphragm. At this time, pt is progressing well towards goals. Recommend continue skilled PT for additional 8 sessions to further improve postural strength and stability, decrease pain and improve function.    Objective:    Trunk AROM: WFL with exception of pain into R thoracic SB    Posture assessment: R 3C scoliosis with rotation with associated posture deviations with trunk deviated R, pelvis deviated L, R shoulder rounded and protruded, pelvis deviated fwd and anteriorly tilted    Palpation: decreased diaphragmatic mobility, moderate restrictions at R thoracic paraspinals    Breathing  assessment: decreased L lateral expansion, decreased diaphragmatic breathing    Zurita test: (+) R thoracic     LLD assessment: LLE mildly shorter      Plan: Continue skilled Physical Therapy 1-2 x/week or a total of 8 visits over a 90 day period. Treatment will include: therapeutic exercises, therapeutic activities, neuro re-ed, manual therapy, HEP       Patient was advised of these findings, precautions, and treatment options and has agreed to actively participate in planning and for this course of care.    Thank you for your referral. If you have any questions, please contact me at Dept: [phone].    Sincerely,  Electronically signed by therapist: Eduarda Angel, PT ,DPT,BSPTS-C2    Physician's certification required:  Yes  Please co-sign or sign and return this letter via fax as soon as possible to [phone].   I certify the need for these services furnished under this plan of treatment and while under my care.    X___________________________________________________ Date____________________    Certification From: 9/25/2024  To:12/24/2024       Treatment:     Date: 9/23/2024  TX#: 7/8 Date:    9/25/24  TX#: 8/8   Neuro re-ed: 42mins  Discussed 3D presentation of scoliosis and effect on pt presentation  Posture assessment at grid of sagittal and frontal planes  Breathing assessment diaphragmatic and lateral costal expansion  Seated lateral expansion then focus at L lateral expansion  Seated diaphragmatic breathing  Seated posture alignment with neutral pelvis   - then added L lateral expansion  Reviewed positioning for sleep, standing, sitting     Neuro re-ed: 33mins  Reviewed 3D presentation of scoliosis and effect on shoulder and pelvic blocks  Supine diaphragmatic expansion  Supine L lateral expansion  Seated posture iwht mirror cue with pelvic corrections, self elongation and L lateral expansion   - then maintenance on exhalation  Instruction on self diaphragmatic release   TherEx 12mins  Review of  current HEP, symptom presentation  Short hanging at stall bar with lateral expansion  L stretch at sink     Manual: 10mins  Supine diaphragm release  Prone: STM R thoracic paraspinals           Goals:  (to be met in 8 visits)   Decrease pain L buttock to enable pt to walk and sit comfortably without pain  Decrease pain in sternum and mid thoracic spine to enable pt to lift daily objects up to 15#  Instruct pt in daily stretches to decrease thoracic spine tightness  Update 9/23  4. Pt will demo normalized diaphragmatic breathing  5. Pt will demo normalized and symmetrical lateral costal expansion  6. Pt will demo full and pain free thoracic mobility      HEP:  Hook lying LTR, bridging, bent leg fallout, s lying hip abd, s lying clam, s lying open book stretch, wall slides sh flexion  9/23: diaphragmatic breathing, L lateral expansion, L stretch at sink   9/25: seated positioning with pelvic correction, self elongation and L lateral expansion with maintenance on exhalation      Plan: Review HEP. Further assess pelvic strength. Progress to derotation ex's in sitting. Progress to standing corrections as able.     Total Timed Treatment: 43min  Total Treatment time: 45 min   Charges: Neuro re-ed x2, Manx1

## 2024-09-25 NOTE — PROGRESS NOTES
Mary Robledo is a 68 year old female.  HPI:   Pt. Is here for follow up on chest pain.  She is doing PT with new PT and she should like a new referral.  Discussed pain meds including her medrol, ibuprofen and tylenol.  Discussed seeing GI.  Recently started atorvastatin and notes it gets stuck in her throat or feels it comes up.  Meds reviewed.      Current Outpatient Medications   Medication Sig Dispense Refill    methylPREDNISolone (MEDROL) 4 MG Oral Tablet Therapy Pack Dosepack: take as directed 1 each 0    acidophilus-pectin Oral Cap Take 1 capsule by mouth daily. Florjen      ATORVASTATIN 10 MG Oral Tab TAKE 1 TABLET(10 MG) BY MOUTH EVERY NIGHT 90 tablet 1    Multiple Vitamins-Minerals (MULTI COMPLETE/IRON) Oral Tab Take by mouth.      Wheat Dextrin (BENEFIBER OR) Take by mouth. Once daily      Flaxseed, Linseed, (GROUND FLAX SEEDS OR) Take by mouth. Every other day.      clobetasol 0.05 % External Cream Apply 1 Application. topically 2 (two) times daily as needed. 30 g 1    Cholecalciferol (VITAMIN D3) 1000 units Oral Cap        No current facility-administered medications for this visit.      Allergies   Allergen Reactions    Sulfa Drugs Cross Reactors      Many years ago, thinks was rash       Past Medical History:    Amenorrhea    Anemia    Anxiety    Arthritis    Broken leg    Diverticulosis    Dysmenorrhea    Dyspareunia    Frozen shoulder    H/O bone scan    osteopenia    H1N1 influenza    History of pneumonia as a child    Hyperlipidemia    Lichen sclerosus    Osteopenia    Pap smear for cervical cancer screening    wnl neg hpv 2014    Pneumonia due to organism    Raynaud's disease    Urinary incontinence    Vestibulitis, vulvar    Vulvodynia      Social History:  Social History     Socioeconomic History    Marital status:    Tobacco Use    Smoking status: Never    Smokeless tobacco: Never   Vaping Use    Vaping status: Never Used   Substance and Sexual Activity    Alcohol use: No    Drug  use: No    Sexual activity: Never     Partners: Male   Other Topics Concern    Caffeine Concern Yes     Comment: occas green tea, occas chocolate, occas soda     Exercise Yes     Comment: treadmill daily    Seat Belt Yes        Results for orders placed or performed during the hospital encounter of 09/22/24   Comp Metabolic Panel (14)   Result Value Ref Range    Glucose 96 70 - 99 mg/dL    Sodium 134 (L) 136 - 145 mmol/L    Potassium 3.7 3.5 - 5.1 mmol/L    Chloride 99 98 - 112 mmol/L    CO2 29.0 21.0 - 32.0 mmol/L    Anion Gap 6 0 - 18 mmol/L    BUN 16 9 - 23 mg/dL    Creatinine 0.82 0.55 - 1.02 mg/dL    Calcium, Total 10.4 8.7 - 10.4 mg/dL    Calculated Osmolality 279 275 - 295 mOsm/kg    eGFR-Cr 77 >=60 mL/min/1.73m2    AST 39 (H) <34 U/L    ALT 19 10 - 49 U/L    Alkaline Phosphatase 86 55 - 142 U/L    Bilirubin, Total 0.7 0.2 - 1.1 mg/dL    Total Protein 8.3 (H) 5.7 - 8.2 g/dL    Albumin 5.2 (H) 3.2 - 4.8 g/dL    Globulin  3.1 2.0 - 3.5 g/dL    A/G Ratio 1.7 1.0 - 2.0   CBC With Differential With Platelet   Result Value Ref Range    WBC 6.5 4.0 - 11.0 x10(3) uL    RBC 3.97 3.80 - 5.30 x10(6)uL    HGB 13.4 12.0 - 16.0 g/dL    HCT 38.2 35.0 - 48.0 %    .0 150.0 - 450.0 10(3)uL    MCV 96.2 80.0 - 100.0 fL    MCH 33.8 26.0 - 34.0 pg    MCHC 35.1 31.0 - 37.0 g/dL    RDW 12.0 %    Neutrophil Absolute Prelim 4.12 1.50 - 7.70 x10 (3) uL    Neutrophil Absolute 4.12 1.50 - 7.70 x10(3) uL    Lymphocyte Absolute 1.87 1.00 - 4.00 x10(3) uL    Monocyte Absolute 0.38 0.10 - 1.00 x10(3) uL    Eosinophil Absolute 0.06 0.00 - 0.70 x10(3) uL    Basophil Absolute 0.04 0.00 - 0.20 x10(3) uL    Immature Granulocyte Absolute 0.01 0.00 - 1.00 x10(3) uL    Neutrophil % 63.5 %    Lymphocyte % 28.9 %    Monocyte % 5.9 %    Eosinophil % 0.9 %    Basophil % 0.6 %    Immature Granulocyte % 0.2 %   Troponin I (High Sensitivity)   Result Value Ref Range    Troponin I (High Sensitivity) 9 <=34 ng/L   D-Dimer   Result Value Ref Range     D-Dimer 0.48 <0.69 ug/mL FEU   EKG   Result Value Ref Range    Ventricular rate 83 BPM    Atrial rate 83 BPM    P-R Interval 128 ms    QRS Duration 84 ms    Q-T Interval 390 ms    QTC Calculation (Bezet) 458 ms    P Axis 64 degrees    R Axis 62 degrees    T Axis 48 degrees   RAINBOW DRAW LAVENDER   Result Value Ref Range    Hold Lavender Auto Resulted    RAINBOW DRAW LIGHT GREEN   Result Value Ref Range    Hold Lt Green Auto Resulted    RAINBOW DRAW BLUE   Result Value Ref Range    Hold Blue Auto Resulted    RAINBOW DRAW GOLD   Result Value Ref Range    Hold Gold Auto Resulted        REVIEW OF SYSTEMS:   GENERAL: feels well otherwise  SKIN: denies any unusual skin lesions  LUNGS: denies shortness of breath with exertion  CARDIOVASCULAR: denies chest pain on exertion  GI: denies abdominal pain,? heartburn  MUSCULOSKELETAL: chest wall and back pain  EXTREMITIES:  No pain or numbness    EXAM:   /78 (BP Location: Left arm, Patient Position: Sitting, Cuff Size: adult)   Pulse 74   Resp 14   Ht 5' 4\" (1.626 m)   Wt 114 lb 4.8 oz (51.8 kg)   SpO2 93%   BMI 19.62 kg/m²   GENERAL: well developed, well nourished,in no apparent distress  SKIN: no rashes,no suspicious lesions  HEENT: atraumatic, normocephalic  NECK: supple,no adenopathy,no bruits  LUNGS: clear to auscultation  CARDIO: RRR without murmur  GI: soft, good BS's; tender at epigastric region with light touch  EXTREMITIES: no cyanosis, clubbing or edema    ASSESSMENT AND PLAN:     Encounter Diagnoses   Name Primary?    Hospital discharge follow-up Yes    Chest wall pain     Gastroesophageal reflux disease, unspecified whether esophagitis present     Interstitial lung disease (HCC)     Neuralgia     Medication management     Vaccine counseling        No orders of the defined types were placed in this encounter.      Meds & Refills for this Visit:  Requested Prescriptions      No prescriptions requested or ordered in this encounter       Imaging &  Consults:  OP REFERRAL TO [facility]    Finish medrol.  Stop ibuprofen.  Tylenol prn.  Monitor stomach.  Cont. PT.  Hospital labs reviewed.  Advised COVID and flu.  The patient indicates understanding of these issues and agrees to the plan.  Return in about 4 weeks (around 10/23/2024) for manipulation.

## 2024-09-30 ENCOUNTER — OFFICE VISIT (OUTPATIENT)
Dept: PHYSICAL THERAPY | Age: 69
End: 2024-09-30
Attending: FAMILY MEDICINE
Payer: MEDICARE

## 2024-09-30 ENCOUNTER — TELEPHONE (OUTPATIENT)
Dept: FAMILY MEDICINE CLINIC | Facility: CLINIC | Age: 69
End: 2024-09-30

## 2024-09-30 PROCEDURE — 97140 MANUAL THERAPY 1/> REGIONS: CPT

## 2024-09-30 PROCEDURE — 97112 NEUROMUSCULAR REEDUCATION: CPT

## 2024-09-30 NOTE — TELEPHONE ENCOUNTER
Pt was in ED 9/22 for chest pain, was given prednisone   LOV 9/25/24 Friday evening she felt \"odd,\" took her BP with 's machine and was 182/88, down to 155/92   Didn't check Saturday (finished prednisone Saturday)  Checked again yesterday evening- 167/86, 144/79   This morning 142/86, 152/90     Last BP in office 128/78   Says where her BP was higher her head felt \"weird\"   Advised it may be the prednisone causing her high BP, check BP in the morning and at night and continue to monitor  Advised on how to check BP: feet on floor, no talking, take 2 readings 5 min apart  Ok to continue to monitor at home? Pls advise/any further recs? Thanks!

## 2024-09-30 NOTE — TELEPHONE ENCOUNTER
Patient is calling because she has been experiencing high blood pressure this weekend 157/98  157/92  152/90 patient just finished prednisone on saturday

## 2024-09-30 NOTE — PROGRESS NOTES
Diagnosis:    Trigger point with back pain, buttock pain      Referring Provider: Kathryn Logan  Date of Evaluation:   8/28/2024    Precautions:  Drug Allergy Next MD visit:   none scheduled  Date of Surgery: n/a   Insurance Primary/Secondary: [employer] / [facility] [place]       # Auth Visits: 8        Treatment Number: 8/8    Subjective: Pt reports she finished methylprednisone pack on saturday. Friday she started feeling lightheaded and found her blood pressure was high. On sunday she checked again and it was better but still high. She spoke with her doctor who said to continue monitoring 1x/day. She currently feels ok. Has been doing her exercises. Would like to review to make sure she is doing them correctly.    Pain: 4/10     Assessment: Continued with manual interventions. Continued moderate restrictions at thoracic paraspinals. Improved diaphragmatic mobility. Initiated scapular strengthening. Pt reported feeling \"coolness\" throughout her spine. Felt better by end of session    Objective:    Trunk AROM: WFL with exception of pain into R thoracic SB    Posture assessment: R 3C scoliosis with rotation with associated posture deviations with trunk deviated R, pelvis deviated L, R shoulder rounded and protruded, pelvis deviated fwd and anteriorly tilted    Palpation: decreased diaphragmatic mobility, moderate restrictions at R thoracic paraspinals    Breathing assessment: decreased L lateral expansion, decreased diaphragmatic breathing    Zurita test: (+) R thoracic     LLD assessment: LLE mildly shorter    Treatment:     Date: 9/23/2024  TX#: 7/8 Date:    9/25/24  TX#: 8/8 Date: 9/30/24  Tx#: 9/16   Neuro re-ed: 42mins  Discussed 3D presentation of scoliosis and effect on pt presentation  Posture assessment at grid of sagittal and frontal planes  Breathing assessment diaphragmatic and lateral costal expansion  Seated lateral expansion then focus at L lateral expansion  Seated diaphragmatic breathing  Seated  posture alignment with neutral pelvis   - then added L lateral expansion  Reviewed positioning for sleep, standing, sitting     Neuro re-ed: 33mins  Reviewed 3D presentation of scoliosis and effect on shoulder and pelvic blocks  Supine diaphragmatic expansion  Supine L lateral expansion  Seated posture iwht mirror cue with pelvic corrections, self elongation and L lateral expansion   - then maintenance on exhalation  Instruction on self diaphragmatic release Neuro re-ed: 32mins  Supine diaphragmatic expansion  Supine L lateral expansion and with maintenance on exhalation  Seated posture  Seated expansion axially and L lateral then with maintenance on exhalation  Seated diaphragmatic breathing  Standing wall nazia back to wall sustained hold [id number]   TherEx 12mins  Review of current HEP, symptom presentation  Short hanging at stall bar with lateral expansion  L stretch at sink      Manual: 10mins  Supine diaphragm release  Prone: STM R thoracic paraspinals Manual: 10mins  Prone STM B thoracic paraspinals,     Supine: diaphragmatic release            Goals:  (to be met in 8 visits)   Decrease pain L buttock to enable pt to walk and sit comfortably without pain  Decrease pain in sternum and mid thoracic spine to enable pt to lift daily objects up to 15#  Instruct pt in daily stretches to decrease thoracic spine tightness  Update 9/23  4. Pt will demo normalized diaphragmatic breathing  5. Pt will demo normalized and symmetrical lateral costal expansion  6. Pt will demo full and pain free thoracic mobility      HEP:  Hook lying LTR, bridging, bent leg fallout, s lying hip abd, s lying clam, s lying open book stretch, wall slides sh flexion  9/23: diaphragmatic breathing, L lateral expansion, L stretch at sink   9/25: seated positioning with pelvic correction, self elongation and L lateral expansion with maintenance on exhalation      Plan: Review HEP. Progress to derotation as able. Progress to core  strengthening.    Total Timed Treatment: 42min  Total Treatment time: 42 min   Charges: Neuro re-ed x2, Manx1

## 2024-09-30 NOTE — TELEPHONE ENCOUNTER
Monitor BP.  Only check BP once daily not 2 in a row.  Low salt diet.  Update me in 1 week.

## 2024-10-02 ENCOUNTER — OFFICE VISIT (OUTPATIENT)
Dept: PHYSICAL THERAPY | Age: 69
End: 2024-10-02
Attending: FAMILY MEDICINE
Payer: MEDICARE

## 2024-10-02 PROCEDURE — 97112 NEUROMUSCULAR REEDUCATION: CPT

## 2024-10-02 PROCEDURE — 97140 MANUAL THERAPY 1/> REGIONS: CPT

## 2024-10-02 NOTE — PROGRESS NOTES
Diagnosis:    Trigger point with back pain, buttock pain      Referring Provider: Kathryn Logan  Date of Evaluation:   8/28/2024    Precautions:  Drug Allergy Next MD visit:   none scheduled  Date of Surgery: n/a   Insurance Primary/Secondary: [employer] / [facility]       # Auth Visits: 8        Treatment Number: 8/8 (ok to see per REGIS)    Subjective: Pt reports she will get quick burst of sharp pain into her chest but better than it was. She has been having good periods of symptom relief. Is also no longer getting bad back discomfort or constant pressure. Laying on her back the pulling isn't as bad. She feels significant improvement overall.  Pain: 4/10     Assessment: Continued with manual interventions with added pressure applied at thoracic paraspinals. Improved diaphragmatic mobility. Improved breathing mechanics with pt able to demo good performance in supine and sit. Initiated core stability work. Cueing required to eliminate spinal and costal compensation.     Objective:    Not measured this session:  Trunk AROM: WFL with exception of pain into R thoracic SB    Posture assessment: R 3C scoliosis with rotation with associated posture deviations with trunk deviated R, pelvis deviated L, R shoulder rounded and protruded, pelvis deviated fwd and anteriorly tilted    Palpation: decreased diaphragmatic mobility, moderate restrictions at R thoracic paraspinals    Breathing assessment: decreased L lateral expansion, decreased diaphragmatic breathing    Zurita test: (+) R thoracic     LLD assessment: LLE mildly shorter    Treatment:     Date: 9/23/2024  TX#: 7/8 Date:    9/25/24  TX#: 8/8 Date: 9/30/24  Tx#: 9/16 Date: 10/2/24  Tx#: 10/16   Neuro re-ed: 42mins  Discussed 3D presentation of scoliosis and effect on pt presentation  Posture assessment at grid of sagittal and frontal planes  Breathing assessment diaphragmatic and lateral costal expansion  Seated lateral expansion then focus at L lateral  expansion  Seated diaphragmatic breathing  Seated posture alignment with neutral pelvis   - then added L lateral expansion  Reviewed positioning for sleep, standing, sitting     Neuro re-ed: 33mins  Reviewed 3D presentation of scoliosis and effect on shoulder and pelvic blocks  Supine diaphragmatic expansion  Supine L lateral expansion  Seated posture iwht mirror cue with pelvic corrections, self elongation and L lateral expansion   - then maintenance on exhalation  Instruction on self diaphragmatic release Neuro re-ed: 32mins  Supine diaphragmatic expansion  Supine L lateral expansion and with maintenance on exhalation  Seated posture  Seated expansion axially and L lateral then with maintenance on exhalation  Seated diaphragmatic breathing  Standing wall nazia back to wall sustained hold [id number] Neuro re-ed: 30mins  Supine diaphragmatic breathing  Supine L lateral expansion with maintenance on exhalation  Supine tra activation  Supine isometric core 65cm ball [id number]  Supine tra + hip add with ball [id number]  Standing wall nazia back to wall 3x  Seated posture   TherEx 12mins  Review of current HEP, symptom presentation  Short hanging at stall bar with lateral expansion  L stretch at sink   Therex:    Manual: 10mins  Supine diaphragm release  Prone: STM R thoracic paraspinals Manual: 10mins  Prone STM B thoracic paraspinals,     Supine: diaphragmatic release Manual: 12mins  Prone STM B thoracic paraspinals, gentle PA  Supine diaphragmatic release             Goals:  (to be met in 8 visits)   Decrease pain L buttock to enable pt to walk and sit comfortably without pain  Decrease pain in sternum and mid thoracic spine to enable pt to lift daily objects up to 15#  Instruct pt in daily stretches to decrease thoracic spine tightness  Update 9/23  4. Pt will demo normalized diaphragmatic breathing  5. Pt will demo normalized and symmetrical lateral costal expansion  6. Pt will demo full and pain free thoracic  mobility      HEP:  Hook lying LTR, bridging, bent leg fallout, s lying hip abd, s lying clam, s lying open book stretch, wall slides sh flexion  9/23: diaphragmatic breathing, L lateral expansion, L stretch at sink   9/25: seated positioning with pelvic correction, self elongation and L lateral expansion with maintenance on exhalation  10/2: supine isometric core press downs 65cm ball, supine tra + hip add with ball      Plan: Review HEP. Progress to derotation as able. Progress core and scapular strengthening    Total Timed Treatment: 42min  Total Treatment time: 42 min   Charges: Neuro re-ed x2, Manx1

## 2024-10-07 ENCOUNTER — APPOINTMENT (OUTPATIENT)
Dept: PHYSICAL THERAPY | Age: 69
End: 2024-10-07
Attending: FAMILY MEDICINE
Payer: MEDICARE

## 2024-10-08 ENCOUNTER — TELEPHONE (OUTPATIENT)
Dept: FAMILY MEDICINE CLINIC | Facility: CLINIC | Age: 69
End: 2024-10-08

## 2024-10-08 NOTE — TELEPHONE ENCOUNTER
Today BP was 165/81  10/7 164/82  10/6 148/81   10/3 152/84  10/2 142/86  Highest BP was 191/100 on 9/27    Pt feels a strange feeling behind her eyes, almost like a headache but not   Describes it feeling like she has something in her head   Pt says only new change is prednisone which she finished 9/28   10/4 received COVID and flu shot   Do you want to have her come in the office for appt? Pls advise, thanks!     She also wanted to pass along she started PT and her chest pain is much better

## 2024-10-08 NOTE — TELEPHONE ENCOUNTER
Advise tylenol for the discomfort and monitor BP -- If BP goes over 200/100, to Rody luna and update in the AM  I am so glad the PT is helping her chest wall pain

## 2024-10-09 ENCOUNTER — OFFICE VISIT (OUTPATIENT)
Dept: FAMILY MEDICINE CLINIC | Facility: CLINIC | Age: 69
End: 2024-10-09
Payer: COMMERCIAL

## 2024-10-09 VITALS
RESPIRATION RATE: 18 BRPM | SYSTOLIC BLOOD PRESSURE: 124 MMHG | DIASTOLIC BLOOD PRESSURE: 72 MMHG | HEIGHT: 64 IN | HEART RATE: 72 BPM | OXYGEN SATURATION: 97 % | BODY MASS INDEX: 19.29 KG/M2 | WEIGHT: 113 LBS

## 2024-10-09 DIAGNOSIS — R09.82 POST-NASAL DRIP: ICD-10-CM

## 2024-10-09 DIAGNOSIS — R51.9 PRESSURE IN HEAD: ICD-10-CM

## 2024-10-09 DIAGNOSIS — R03.0 ELEVATED BLOOD PRESSURE READING: Primary | ICD-10-CM

## 2024-10-09 PROCEDURE — 3074F SYST BP LT 130 MM HG: CPT

## 2024-10-09 PROCEDURE — 1170F FXNL STATUS ASSESSED: CPT

## 2024-10-09 PROCEDURE — 99214 OFFICE O/P EST MOD 30 MIN: CPT

## 2024-10-09 PROCEDURE — 3008F BODY MASS INDEX DOCD: CPT

## 2024-10-09 PROCEDURE — 1159F MED LIST DOCD IN RCRD: CPT

## 2024-10-09 PROCEDURE — 3078F DIAST BP <80 MM HG: CPT

## 2024-10-09 PROCEDURE — 1160F RVW MEDS BY RX/DR IN RCRD: CPT

## 2024-10-09 NOTE — TELEPHONE ENCOUNTER
BP at 6:09 am 147/87.    Appointment scheduled today with Angélica.  Will bring home BP machine to appointment.

## 2024-10-09 NOTE — PATIENT INSTRUCTIONS
Record blood pressures for 2 weeks; random times, when you are relaxed. Keep in mind that your cuff is approximately 20-25 points higher than ours in the office.     Continue to monitor the head pain and pressure. If you experience anything abrupt or that would be described as the worst headache ever, go to ER.     Can consider carotid ultrasound scan.     Can take ibuprofen or tylenol for head / chest pain.     Keep appointment with Dr Logan on 10/30/24.     Call or message if you think of anything else.

## 2024-10-09 NOTE — PROGRESS NOTES
Subjective:   Mary Robledo is a 68 year old female who presents for elevated BP.     Pt has been recently noticing high blood pressure. Recently got 2 vaccines and was also on methylprednisolone. Stopped steroid in case it was a contributing factor in elevating BP.     From RN triage notes:   10/9 147/87  10/8 165/81  10/7 164/82  10/6 148/81   10/3 152/84  10/2 142/86  Highest BP was 191/100 on 9/27   Pt feels a strange feeling behind her eyes, almost like a headache but not   Describes it feeling like she has something in her head   Pt says only new change is prednisone which she finished 9/28   10/4 received COVID and flu shot     Weird feeling in head. Pt woke up with this feeling very early today. Feels pain and pressure in back of head, behind eyes, and goes through whole head. Has had lack of sleep lately and is wondering if this could be related.     Has 2 aunts that had brain aneurysms. Another aunt had a stroke. Discussed PV screenings; may opt to do the carotid scan.       History/Other:    Chief Complaint Reviewed and Verified  No Further Nursing Notes to   Review  Tobacco Reviewed  Allergies Reviewed  Medications Reviewed    Problem List Reviewed  Medical History Reviewed  Surgical History   Reviewed  Family History Reviewed         Tobacco:  She has never smoked tobacco.    Current Outpatient Medications   Medication Sig Dispense Refill    acidophilus-pectin Oral Cap Take 1 capsule by mouth daily. Florjen      ATORVASTATIN 10 MG Oral Tab TAKE 1 TABLET(10 MG) BY MOUTH EVERY NIGHT 90 tablet 1    Multiple Vitamins-Minerals (MULTI COMPLETE/IRON) Oral Tab Take by mouth.      Wheat Dextrin (BENEFIBER OR) Take by mouth. Once daily      Flaxseed, Linseed, (GROUND FLAX SEEDS OR) Take by mouth. Every other day.      clobetasol 0.05 % External Cream Apply 1 Application. topically 2 (two) times daily as needed. 30 g 1    Cholecalciferol (VITAMIN D3) 1000 units Oral Cap            Review of  Systems:  Review of Systems   Constitutional: Negative.    HENT:  Positive for postnasal drip.    Eyes: Negative.    Respiratory: Negative.     Cardiovascular: Negative.    Gastrointestinal:         Has been having pain in chest; believed to be caused by esophagus.    Endocrine: Negative.    Genitourinary: Negative.    Musculoskeletal: Negative.    Skin: Negative.    Allergic/Immunologic: Negative.    Neurological:  Positive for headaches.   Hematological: Negative.    Psychiatric/Behavioral: Negative.         Objective:   /72 (BP Location: Left arm, Patient Position: Sitting, Cuff Size: adult)   Pulse 72   Resp 18   Ht 5' 4\" (1.626 m)   Wt 113 lb (51.3 kg)   SpO2 97%   BMI 19.40 kg/m²  Estimated body mass index is 19.4 kg/m² as calculated from the following:    Height as of this encounter: 5' 4\" (1.626 m).    Weight as of this encounter: 113 lb (51.3 kg).    Physical Exam  Vitals reviewed.   Constitutional:       General: She is not in acute distress.     Appearance: Normal appearance. She is normal weight. She is not ill-appearing, toxic-appearing or diaphoretic.   HENT:      Head: Normocephalic and atraumatic.   Eyes:      General: No scleral icterus.        Right eye: No discharge.         Left eye: No discharge.      Conjunctiva/sclera: Conjunctivae normal.   Neck:      Vascular: No carotid bruit.   Cardiovascular:      Rate and Rhythm: Normal rate and regular rhythm.      Pulses: Normal pulses.      Heart sounds: Normal heart sounds. No murmur heard.     No friction rub. No gallop.   Pulmonary:      Effort: Pulmonary effort is normal. No respiratory distress.      Breath sounds: Normal breath sounds. No stridor. No wheezing, rhonchi or rales.   Chest:      Chest wall: No tenderness.   Musculoskeletal:      Cervical back: Normal range of motion.   Skin:     General: Skin is warm and dry.   Neurological:      General: No focal deficit present.      Mental Status: She is alert and oriented to person,  place, and time.   Psychiatric:         Mood and Affect: Mood normal.         Behavior: Behavior normal.         Thought Content: Thought content normal.         Judgment: Judgment normal.       Assessment & Plan:   1. Elevated blood pressure reading (Primary)  2. Post-nasal drip  3. Pressure in head    Record blood pressures for 2 weeks; random times, when you are relaxed. Keep in mind that your cuff is approximately 20-25 points higher than ours in the office.     Continue to monitor the head pain and pressure. If you experience anything abrupt or that would be described as the worst headache ever, go to ER.     Can consider carotid ultrasound scan.     Can take ibuprofen or tylenol for head / chest pain.     Can consider Xyzal/Zyrtec/Claritin for post nasal drip.     Keep appointment with Dr Logan on 10/30/24.     Call or message if you think of anything else.       Return in about 3 weeks (around 10/30/2024).  Will see Dr Logan on 10/30/24    RAQUEL Medrano, 10/9/2024, 12:04 PM

## 2024-10-14 ENCOUNTER — OFFICE VISIT (OUTPATIENT)
Dept: PHYSICAL THERAPY | Age: 69
End: 2024-10-14
Attending: FAMILY MEDICINE
Payer: MEDICARE

## 2024-10-14 PROCEDURE — 97112 NEUROMUSCULAR REEDUCATION: CPT

## 2024-10-14 PROCEDURE — 97140 MANUAL THERAPY 1/> REGIONS: CPT

## 2024-10-14 NOTE — PROGRESS NOTES
Diagnosis:    Trigger point with back pain, buttock pain      Referring Provider: Kathryn Logan  Date of Evaluation:   8/28/2024    Precautions:  Drug Allergy Next MD visit:   none scheduled  Date of Surgery: n/a   Insurance Primary/Secondary: [employer] / [facility] [place]       # Auth Visits: [id number]        Treatment Number: 8/8 + 8 exp 11/13    Subjective: Pt reports she was doing very well up until today. Started feeling sore/discomfort in the front. About an hour ago started feeling a lot of soreness in the front of her chest and into her stomach. Symptoms not as bad as previously, but still noticeable.   Pain: 4/10     Assessment: Pt presents with flareup of symptoms this date. Note mild increase in soft tissue restrictions from previous session which improve wiht manual interventions. Pt with improved posture and breathing mechanics. Improved strength, able to progress stabilization without compensation.     Objective:    Not measured this session:  Trunk AROM: WFL with exception of pain into R thoracic SB    Posture assessment: R 3C scoliosis with rotation with associated posture deviations with trunk deviated R, pelvis deviated L, R shoulder rounded and protruded, pelvis deviated fwd and anteriorly tilted    Palpation: decreased diaphragmatic mobility, moderate restrictions at R thoracic paraspinals    Breathing assessment: decreased L lateral expansion, decreased diaphragmatic breathing    Zurita test: (+) R thoracic     LLD assessment: LLE mildly shorter    Treatment:     Date: 9/23/2024  TX#: 7/8 Date:    9/25/24  TX#: 8/8 Date: 9/30/24  Tx#: 9/16 Date: 10/2/24  Tx#: 10/16 Date: 10/14/24  Tx#: 11/16   Neuro re-ed: 42mins  Discussed 3D presentation of scoliosis and effect on pt presentation  Posture assessment at grid of sagittal and frontal planes  Breathing assessment diaphragmatic and lateral costal expansion  Seated lateral expansion then focus at L lateral expansion  Seated diaphragmatic  breathing  Seated posture alignment with neutral pelvis   - then added L lateral expansion  Reviewed positioning for sleep, standing, sitting     Neuro re-ed: 33mins  Reviewed 3D presentation of scoliosis and effect on shoulder and pelvic blocks  Supine diaphragmatic expansion  Supine L lateral expansion  Seated posture iwht mirror cue with pelvic corrections, self elongation and L lateral expansion   - then maintenance on exhalation  Instruction on self diaphragmatic release Neuro re-ed: 32mins  Supine diaphragmatic expansion  Supine L lateral expansion and with maintenance on exhalation  Seated posture  Seated expansion axially and L lateral then with maintenance on exhalation  Seated diaphragmatic breathing  Standing wall nazia back to wall sustained hold [id number] Neuro re-ed: 30mins  Supine diaphragmatic breathing  Supine L lateral expansion with maintenance on exhalation  Supine tra activation  Supine isometric core 65cm ball [id number]  Supine tra + hip add with ball [id number]  Standing wall nazia back to wall 3x  Seated posture Neuro re-ed: 25mins  Supine diaphragmatic breathing  Seated L lateral expansion with maintenance on exhalation  Standing isometric core press downs 65cm ball 10x  Seated tra + hip add with ball 5x  Standing wall angels then progress to pivot prone sustained holds 8x5sec     TherEx 12mins  Review of current HEP, symptom presentation  Short hanging at stall bar with lateral expansion  L stretch at sink   Therex:     Manual: 10mins  Supine diaphragm release  Prone: STM R thoracic paraspinals Manual: 10mins  Prone STM B thoracic paraspinals,     Supine: diaphragmatic release Manual: 12mins  Prone STM B thoracic paraspinals, gentle PA  Supine diaphragmatic release Manual: 15mins  Supine: diaphragmatic release  Prone: STM B thoracic paraspinals, gentle costal mobilizations              Goals:  (to be met in 8 visits)   Decrease pain L buttock to enable pt to walk and sit comfortably without  pain  Decrease pain in sternum and mid thoracic spine to enable pt to lift daily objects up to 15#  Instruct pt in daily stretches to decrease thoracic spine tightness  Update 9/23  4. Pt will demo normalized diaphragmatic breathing  5. Pt will demo normalized and symmetrical lateral costal expansion  6. Pt will demo full and pain free thoracic mobility      HEP:  Hook lying LTR, bridging, bent leg fallout, s lying hip abd, s lying clam, s lying open book stretch, wall slides sh flexion  9/23: diaphragmatic breathing, L lateral expansion, L stretch at sink   9/25: seated positioning with pelvic correction, self elongation and L lateral expansion with maintenance on exhalation  10/2: supine isometric core press downs 65cm ball, supine tra + hip add with ball  10/14: standing isometric core press down, seated hip add with ball, standing pivot prone holds      Plan: assess effect of HEP progressions. Progress to derotation as able. Progress core and scapular strengthening    Total Timed Treatment: 40min  Total Treatment time: 40 min   Charges: Neuro re-ed x2, Manx1

## 2024-10-16 ENCOUNTER — OFFICE VISIT (OUTPATIENT)
Dept: PHYSICAL THERAPY | Age: 69
End: 2024-10-16
Attending: FAMILY MEDICINE
Payer: MEDICARE

## 2024-10-16 PROCEDURE — 97140 MANUAL THERAPY 1/> REGIONS: CPT

## 2024-10-16 PROCEDURE — 97112 NEUROMUSCULAR REEDUCATION: CPT

## 2024-10-16 NOTE — PROGRESS NOTES
Diagnosis:    Trigger point with back pain, buttock pain      Referring Provider: Kathryn Logan  Date of Evaluation:   8/28/2024    Precautions:  Drug Allergy Next MD visit:   none scheduled  Date of Surgery: n/a   Insurance Primary/Secondary: [employer] / [facility]       # Auth Visits: [id number]        Treatment Number: 8/8 + 8 exp 11/13    Subjective: Pt reports she is feeling better than Monday, but still sore in her abdominal area and across her back. Exercise against the wall is difficult.  Pain: 4/10     Assessment: Continued with manual interventions to address soft tissue restrictions into trunk musculature. Added work into R UT to eliminate compensation for scapular weakness. Pt reported improved overall symptoms following.     Objective:    Not measured this session:  Trunk AROM: WFL with exception of pain into R thoracic SB    Posture assessment: R 3C scoliosis with rotation with associated posture deviations with trunk deviated R, pelvis deviated L, R shoulder rounded and protruded, pelvis deviated fwd and anteriorly tilted    Palpation: decreased diaphragmatic mobility, moderate restrictions at R thoracic paraspinals    Breathing assessment: decreased L lateral expansion, decreased diaphragmatic breathing    Zurita test: (+) R thoracic     LLD assessment: LLE mildly shorter    Treatment:     Date: 10/16/2024  TX#: 12/16 Date:    9/25/24  TX#: 8/8 Date: 9/30/24  Tx#: 9/16 Date: 10/2/24  Tx#: 10/16 Date: 10/14/24  Tx#: 11/16   Neuro re-ed: 10 mins  Supine diaphragmatic breathing  Standing pivot prone sustained hold  Standing isometric core press downs  Review of standing L lateral costal expansion  Instruction on use of tennis ball for self STM at thoracic region Neuro re-ed: 33mins  Reviewed 3D presentation of scoliosis and effect on shoulder and pelvic blocks  Supine diaphragmatic expansion  Supine L lateral expansion  Seated posture iwht mirror cue with pelvic corrections, self elongation and L  lateral expansion   - then maintenance on exhalation  Instruction on self diaphragmatic release Neuro re-ed: 32mins  Supine diaphragmatic expansion  Supine L lateral expansion and with maintenance on exhalation  Seated posture  Seated expansion axially and L lateral then with maintenance on exhalation  Seated diaphragmatic breathing  Standing wall nazia back to wall sustained hold [id number] Neuro re-ed: 30mins  Supine diaphragmatic breathing  Supine L lateral expansion with maintenance on exhalation  Supine tra activation  Supine isometric core 65cm ball [id number]  Supine tra + hip add with ball [id number]  Standing wall nazia back to wall 3x  Seated posture Neuro re-ed: 25mins  Supine diaphragmatic breathing  Seated L lateral expansion with maintenance on exhalation  Standing isometric core press downs 65cm ball 10x  Seated tra + hip add with ball 5x  Standing wall angels then progress to pivot prone sustained holds 8x5sec     TherEx    Therex:    Manual: 35mins  Supine diaphragmatic release  Prone: STM B thoracic/lumbar paraspinals, mid trap/rhomboid on R, R UT  Seated: TP release R UT, supraspinatus Manual: 10mins  Supine diaphragm release  Prone: STM R thoracic paraspinals Manual: 10mins  Prone STM B thoracic paraspinals,     Supine: diaphragmatic release Manual: 12mins  Prone STM B thoracic paraspinals, gentle PA  Supine diaphragmatic release Manual: 15mins  Supine: diaphragmatic release  Prone: STM B thoracic paraspinals, gentle costal mobilizations              Goals:  (to be met in 8 visits)   Decrease pain L buttock to enable pt to walk and sit comfortably without pain  Decrease pain in sternum and mid thoracic spine to enable pt to lift daily objects up to 15#  Instruct pt in daily stretches to decrease thoracic spine tightness  Update 9/23  4. Pt will demo normalized diaphragmatic breathing  5. Pt will demo normalized and symmetrical lateral costal expansion  6. Pt will demo full and pain free thoracic  mobility      HEP:  Hook lying LTR, bridging, bent leg fallout, s lying hip abd, s lying clam, s lying open book stretch, wall slides sh flexion  9/23: diaphragmatic breathing, L lateral expansion, L stretch at sink   9/25: seated positioning with pelvic correction, self elongation and L lateral expansion with maintenance on exhalation  10/2: supine isometric core press downs 65cm ball, supine tra + hip add with ball  10/14: standing isometric core press down, seated hip add with ball, standing pivot prone holds      Plan: Pt on vacation x1week. Will resume PT on pt return. May decrease pivot prone to every other day for muscle recovery    Total Timed Treatment: 45min  Total Treatment time: 45 min   Charges: Neuro re-ed x1, Manx2

## 2024-10-23 ENCOUNTER — TELEPHONE (OUTPATIENT)
Facility: CLINIC | Age: 69
End: 2024-10-23

## 2024-10-23 NOTE — TELEPHONE ENCOUNTER
Received clearance from [facility] for an EGD. Patient will need appointment for clearance.

## 2024-10-23 NOTE — TELEPHONE ENCOUNTER
Patient called. Made aware received fax from [facility] looking for clearance for EGD. Per patient, unsure if will get it done, [place]. Patient advised to call our office once back in the state to further discuss. Made aware unsure if Dr. Head would want to see her before clearing her for the procedure. Patient verbalized understanding.

## 2024-10-30 ENCOUNTER — OFFICE VISIT (OUTPATIENT)
Dept: PHYSICAL THERAPY | Age: 69
End: 2024-10-30
Attending: FAMILY MEDICINE
Payer: MEDICARE

## 2024-10-30 ENCOUNTER — OFFICE VISIT (OUTPATIENT)
Dept: FAMILY MEDICINE CLINIC | Facility: CLINIC | Age: 69
End: 2024-10-30
Payer: COMMERCIAL

## 2024-10-30 VITALS
BODY MASS INDEX: 19.46 KG/M2 | SYSTOLIC BLOOD PRESSURE: 150 MMHG | OXYGEN SATURATION: 96 % | HEART RATE: 77 BPM | HEIGHT: 64 IN | RESPIRATION RATE: 18 BRPM | DIASTOLIC BLOOD PRESSURE: 72 MMHG | WEIGHT: 114 LBS

## 2024-10-30 DIAGNOSIS — Z82.49 FAMILY HISTORY OF HYPERTENSION: ICD-10-CM

## 2024-10-30 DIAGNOSIS — Z79.899 MEDICATION MANAGEMENT: ICD-10-CM

## 2024-10-30 DIAGNOSIS — R03.0 ELEVATED BLOOD PRESSURE READING: Primary | ICD-10-CM

## 2024-10-30 DIAGNOSIS — M94.0 COSTOCHONDRITIS: ICD-10-CM

## 2024-10-30 DIAGNOSIS — K21.9 GASTROESOPHAGEAL REFLUX DISEASE, UNSPECIFIED WHETHER ESOPHAGITIS PRESENT: ICD-10-CM

## 2024-10-30 DIAGNOSIS — J84.9 INTERSTITIAL LUNG DISEASE (HCC): ICD-10-CM

## 2024-10-30 PROCEDURE — 1159F MED LIST DOCD IN RCRD: CPT | Performed by: FAMILY MEDICINE

## 2024-10-30 PROCEDURE — 97112 NEUROMUSCULAR REEDUCATION: CPT

## 2024-10-30 PROCEDURE — 99214 OFFICE O/P EST MOD 30 MIN: CPT | Performed by: FAMILY MEDICINE

## 2024-10-30 PROCEDURE — 3008F BODY MASS INDEX DOCD: CPT | Performed by: FAMILY MEDICINE

## 2024-10-30 PROCEDURE — 97140 MANUAL THERAPY 1/> REGIONS: CPT

## 2024-10-30 PROCEDURE — 3077F SYST BP >= 140 MM HG: CPT | Performed by: FAMILY MEDICINE

## 2024-10-30 PROCEDURE — 1170F FXNL STATUS ASSESSED: CPT | Performed by: FAMILY MEDICINE

## 2024-10-30 PROCEDURE — 3078F DIAST BP <80 MM HG: CPT | Performed by: FAMILY MEDICINE

## 2024-10-30 PROCEDURE — 1160F RVW MEDS BY RX/DR IN RCRD: CPT | Performed by: FAMILY MEDICINE

## 2024-10-30 RX ORDER — LISINOPRIL 10 MG/1
10 TABLET ORAL DAILY
Qty: 30 TABLET | Refills: 3 | Status: SHIPPED | OUTPATIENT
Start: 2024-10-30 | End: 2025-10-25

## 2024-10-30 NOTE — PROGRESS NOTES
Diagnosis:    Trigger point with back pain, buttock pain      Referring Provider: Kathryn Logan  Date of Evaluation:   8/28/2024    Precautions:  Drug Allergy Next MD visit:   none scheduled  Date of Surgery: n/a   Insurance Primary/Secondary: [employer] / [facility] [place]       # Auth Visits: [id number]         Treatment Number: 8/8 + 8 exp 11/13    Subjective: Pt reports she is feeling much better. Still gets some tension into the front of her ribcage. Some discomfort and tightness in her back when she lays flat on her back. Trip to [employer] was good; able to tolerate walking and activity without issue.  Pain: 2/10     Assessment: Pt with remaining soft tissue restrictions along diaphragm and thoracic paraspinals although improved. Reviewed strengthening/stability work and progressed postural breathing. Pt with notable improvement in posture awareness and breathing mechanics.     Objective:      Posture assessment: R 3C scoliosis with rotation with associated posture deviations with trunk deviated R, pelvis deviated L, R shoulder rounded and protruded, pelvis deviated fwd and anteriorly tilted    Palpation: decreased diaphragmatic mobility, moderate restrictions at R thoracic paraspinals    Zurita test: (+) R thoracic     LLD assessment: LLE mildly shorter    Treatment:     Date: 10/16/2024  TX#: 12/16 Date:    10/30/24  TX#: 13/16 Date: 9/30/24  Tx#: 9/16 Date: 10/2/24  Tx#: 10/16 Date: 10/14/24  Tx#: 11/16   Neuro re-ed: 10 mins  Supine diaphragmatic breathing  Standing pivot prone sustained hold  Standing isometric core press downs  Review of standing L lateral costal expansion  Instruction on use of tennis ball for self STM at thoracic region Neuro re-ed: 25mins  Supine isometric core with march   Standing isometric core press downs  Standing pivot prone holds  Standing L lateral costal expansion with maintenance on exhalation  Standing anterior costal expansion then added focus at R anterior expansion Neuro re-ed:  32mins  Supine diaphragmatic expansion  Supine L lateral expansion and with maintenance on exhalation  Seated posture  Seated expansion axially and L lateral then with maintenance on exhalation  Seated diaphragmatic breathing  Standing wall nazia back to wall sustained hold [id number] Neuro re-ed: 30mins  Supine diaphragmatic breathing  Supine L lateral expansion with maintenance on exhalation  Supine tra activation  Supine isometric core 65cm ball [id number]  Supine tra + hip add with ball [id number]  Standing wall nazia back to wall 3x  Seated posture Neuro re-ed: 25mins  Supine diaphragmatic breathing  Seated L lateral expansion with maintenance on exhalation  Standing isometric core press downs 65cm ball 10x  Seated tra + hip add with ball 5x  Standing wall angels then progress to pivot prone sustained holds 8x5sec     TherEx  Therex:3mins  Sidely thoracic rotations  Review of HEP  Therex:    Manual: 35mins  Supine diaphragmatic release  Prone: STM B thoracic/lumbar paraspinals, mid trap/rhomboid on R, R UT  Seated: TP release R UT, supraspinatus Manual: 15mins  Supine diaphragm release  Prone: STM R thoracic paraspinals Manual: 10mins  Prone STM B thoracic paraspinals,     Supine: diaphragmatic release Manual: 12mins  Prone STM B thoracic paraspinals, gentle PA  Supine diaphragmatic release Manual: 15mins  Supine: diaphragmatic release  Prone: STM B thoracic paraspinals, gentle costal mobilizations              Goals:  (to be met in 8 visits)   Decrease pain L buttock to enable pt to walk and sit comfortably without pain  Decrease pain in sternum and mid thoracic spine to enable pt to lift daily objects up to 15#  Instruct pt in daily stretches to decrease thoracic spine tightness  Update 9/23  4. Pt will demo normalized diaphragmatic breathing  5. Pt will demo normalized and symmetrical lateral costal expansion  6. Pt will demo full and pain free thoracic mobility      HEP:  Hook lying LTR, bridging, bent leg  fallout, s lying hip abd, s lying clam, s lying open book stretch, wall slides sh flexion  9/23: diaphragmatic breathing, L lateral expansion, L stretch at sink   9/25: seated positioning with pelvic correction, self elongation and L lateral expansion with maintenance on exhalation  10/2: supine isometric core press downs 65cm ball, supine tra + hip add with ball  10/14: standing isometric core press down, seated hip add with ball, standing pivot prone holds  10/30: R anterior costal expansion, supine isometric core with march, sidely thoracic rotations      Plan: continue with manual interventions as indicated. Review breathing mechanics and progress to combined breathing technique.    Total Timed Treatment: 43min  Total Treatment time: 43min   Charges: Neuro re-ed x2, Manx1
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
## 2024-11-05 ENCOUNTER — APPOINTMENT (OUTPATIENT)
Dept: PHYSICAL THERAPY | Facility: HOSPITAL | Age: 69
End: 2024-11-05
Attending: FAMILY MEDICINE
Payer: MEDICARE

## 2024-11-06 ENCOUNTER — TELEPHONE (OUTPATIENT)
Dept: FAMILY MEDICINE CLINIC | Facility: CLINIC | Age: 69
End: 2024-11-06

## 2024-11-06 ENCOUNTER — OFFICE VISIT (OUTPATIENT)
Dept: PHYSICAL THERAPY | Age: 69
End: 2024-11-06
Attending: FAMILY MEDICINE
Payer: MEDICARE

## 2024-11-06 PROCEDURE — 97140 MANUAL THERAPY 1/> REGIONS: CPT

## 2024-11-06 PROCEDURE — 97112 NEUROMUSCULAR REEDUCATION: CPT

## 2024-11-06 PROCEDURE — 97110 THERAPEUTIC EXERCISES: CPT

## 2024-11-06 NOTE — TELEPHONE ENCOUNTER
Feels \"woozy\" but not dizzy   Since starting Rx  /73, 106/65  Advised body might be adjusting   Can try to space out, if it will help   She's agreeable   Continue to monitor BP

## 2024-11-06 NOTE — PROGRESS NOTES
Diagnosis:    Trigger point with back pain, buttock pain      Referring Provider: Kathryn Logan  Date of Evaluation:   8/28/2024    Precautions:  Drug Allergy Next MD visit:   none scheduled  Date of Surgery: n/a   Insurance Primary/Secondary: [employer] / [facility]       # Auth Visits: [id number]         Treatment Number: 8/8 + 8 exp 11/13    Subjective: Pt reports not feeling great today. After last session started feeling tightening and discomfort into the front. This morning before she got out of bed she felt pretty bad. Reports was prescribed pills on Monday for high blood pressure. Has been feeling some light headedness with the new medication. Her blood pressure has come down. Reached out to MD to discuss if symptoms are normal   Pain: 4/10     Assessment: Focused on thoracic mobility and flexibility this session. Greatest restrictions noted along R thoracic paraspinals. Pt reported significant improvement in overall symptoms following manual interventions. Instructed pt on additional stretches to facilitate good thoracic mobility. Reviewed standing breathing corrections with pt demonstrating good mechanics.    Objective:      Posture assessment: R 3C scoliosis with rotation with associated posture deviations with trunk deviated R, pelvis deviated L, R shoulder rounded and protruded, pelvis deviated fwd and anteriorly tilted    Palpation: decreased diaphragmatic mobility, moderate restrictions at R thoracic paraspinals    Zurita test: (+) R thoracic     LLD assessment: LLE mildly shorter    Treatment:     Date: 10/16/2024  TX#: 12/16 Date:    10/30/24  TX#: 13/16 Date: 11/6/24  Tx#: 14/16 Date: 10/2/24  Tx#: 10/16 Date: 10/14/24  Tx#: 11/16   Neuro re-ed: 10 mins  Supine diaphragmatic breathing  Standing pivot prone sustained hold  Standing isometric core press downs  Review of standing L lateral costal expansion  Instruction on use of tennis ball for self STM at thoracic region Neuro re-ed:  25mins  Supine isometric core with march   Standing isometric core press downs  Standing pivot prone holds  Standing L lateral costal expansion with maintenance on exhalation  Standing anterior costal expansion then added focus at R anterior expansion Neuro re-ed: 10mins  Standing R anterior costal expansion  Standing L lateral costal expansion  Standing scapular retraction/posture awareness with breathing corrections Neuro re-ed: 30mins  Supine diaphragmatic breathing  Supine L lateral expansion with maintenance on exhalation  Supine tra activation  Supine isometric core 65cm ball [id number]  Supine tra + hip add with ball [id number]  Standing wall nazia back to wall 3x  Seated posture Neuro re-ed: 25mins  Supine diaphragmatic breathing  Seated L lateral expansion with maintenance on exhalation  Standing isometric core press downs 65cm ball 10x  Seated tra + hip add with ball 5x  Standing wall angels then progress to pivot prone sustained holds 8x5sec     TherEx  Therex:3mins  Sidely thoracic rotations  Review of HEP Therex: 14mins  Standing pivot prone sustained hold then with rolled towel at lumbar  Self STM with FR horizontally across thoracic at wall  Lorraine pose diagonals with lateral expansion  Quadruped cat/cow Therex:    Manual: 35mins  Supine diaphragmatic release  Prone: STM B thoracic/lumbar paraspinals, mid trap/rhomboid on R, R UT  Seated: TP release R UT, supraspinatus Manual: 15mins  Supine diaphragm release  Prone: STM R thoracic paraspinals Manual: 16mins  Prone STM B thoracic paraspinals, gentle thoracic PA and thoracolumbar and segmental lumbar distraction Manual: 12mins  Prone STM B thoracic paraspinals, gentle PA  Supine diaphragmatic release Manual: 15mins  Supine: diaphragmatic release  Prone: STM B thoracic paraspinals, gentle costal mobilizations              Goals:  (to be met in 8 visits)   Decrease pain L buttock to enable pt to walk and sit comfortably without pain  Decrease pain in sternum  and mid thoracic spine to enable pt to lift daily objects up to 15#  Instruct pt in daily stretches to decrease thoracic spine tightness  Update 9/23  4. Pt will demo normalized diaphragmatic breathing  5. Pt will demo normalized and symmetrical lateral costal expansion  6. Pt will demo full and pain free thoracic mobility      HEP:  Hook lying LTR, bridging, bent leg fallout, s lying hip abd, s lying clam, s lying open book stretch, wall slides sh flexion  9/23: diaphragmatic breathing, L lateral expansion, L stretch at sink   9/25: seated positioning with pelvic correction, self elongation and L lateral expansion with maintenance on exhalation  10/2: supine isometric core press downs 65cm ball, supine tra + hip add with ball  10/14: standing isometric core press down, seated hip add with ball, standing pivot prone holds  10/30: R anterior costal expansion, supine isometric core with march, sidely thoracic rotations  11/6: quadruped cat/cow, flor pose lateral, self STM with FR      Plan: reassess symptoms. Progress core strengthening as tolerated    Total Timed Treatment: 40min  Total Treatment time: 40min   Charges: Neuro re-ed x1, Manx1, therex x1

## 2024-11-06 NOTE — TELEPHONE ENCOUNTER
Patient has been taking BP medication for 3 days now. She stated she is feeling a little off in the head.    Is this normal?

## 2024-11-08 ENCOUNTER — TELEPHONE (OUTPATIENT)
Dept: FAMILY MEDICINE CLINIC | Facility: CLINIC | Age: 69
End: 2024-11-08

## 2024-11-08 ENCOUNTER — OFFICE VISIT (OUTPATIENT)
Dept: FAMILY MEDICINE CLINIC | Facility: CLINIC | Age: 69
End: 2024-11-08
Payer: COMMERCIAL

## 2024-11-08 VITALS
HEART RATE: 76 BPM | BODY MASS INDEX: 19.46 KG/M2 | RESPIRATION RATE: 18 BRPM | WEIGHT: 114 LBS | DIASTOLIC BLOOD PRESSURE: 80 MMHG | SYSTOLIC BLOOD PRESSURE: 134 MMHG | HEIGHT: 64 IN

## 2024-11-08 DIAGNOSIS — R55 NEAR SYNCOPE: ICD-10-CM

## 2024-11-08 DIAGNOSIS — I10 PRIMARY HYPERTENSION: Primary | ICD-10-CM

## 2024-11-08 DIAGNOSIS — R42 DIZZINESS: ICD-10-CM

## 2024-11-08 DIAGNOSIS — R42 LIGHTHEADED: ICD-10-CM

## 2024-11-08 DIAGNOSIS — Z79.899 MEDICATION MANAGEMENT: ICD-10-CM

## 2024-11-08 LAB
ALBUMIN SERPL-MCNC: 4.3 G/DL (ref 3.2–4.8)
ALBUMIN/GLOB SERPL: 1.4 {RATIO} (ref 1–2)
ALP LIVER SERPL-CCNC: 73 U/L
ALT SERPL-CCNC: 19 U/L
ANION GAP SERPL CALC-SCNC: 3 MMOL/L (ref 0–18)
AST SERPL-CCNC: 42 U/L (ref ?–34)
BILIRUB SERPL-MCNC: 0.7 MG/DL (ref 0.2–1.1)
BUN BLD-MCNC: 14 MG/DL (ref 9–23)
CALCIUM BLD-MCNC: 10.6 MG/DL (ref 8.7–10.4)
CHLORIDE SERPL-SCNC: 98 MMOL/L (ref 98–112)
CO2 SERPL-SCNC: 31 MMOL/L (ref 21–32)
CREAT BLD-MCNC: 0.82 MG/DL
EGFRCR SERPLBLD CKD-EPI 2021: 77 ML/MIN/1.73M2 (ref 60–?)
FASTING STATUS PATIENT QL REPORTED: NO
GLOBULIN PLAS-MCNC: 3.1 G/DL (ref 2–3.5)
GLUCOSE BLD-MCNC: 105 MG/DL (ref 70–99)
OSMOLALITY SERPL CALC.SUM OF ELEC: 275 MOSM/KG (ref 275–295)
POTASSIUM SERPL-SCNC: 4.7 MMOL/L (ref 3.5–5.1)
PROT SERPL-MCNC: 7.4 G/DL (ref 5.7–8.2)
SODIUM SERPL-SCNC: 132 MMOL/L (ref 136–145)

## 2024-11-08 PROCEDURE — 1159F MED LIST DOCD IN RCRD: CPT

## 2024-11-08 PROCEDURE — 80053 COMPREHEN METABOLIC PANEL: CPT

## 2024-11-08 PROCEDURE — 3079F DIAST BP 80-89 MM HG: CPT

## 2024-11-08 PROCEDURE — 1160F RVW MEDS BY RX/DR IN RCRD: CPT

## 2024-11-08 PROCEDURE — 3008F BODY MASS INDEX DOCD: CPT

## 2024-11-08 PROCEDURE — 99214 OFFICE O/P EST MOD 30 MIN: CPT

## 2024-11-08 PROCEDURE — 1170F FXNL STATUS ASSESSED: CPT

## 2024-11-08 PROCEDURE — 3075F SYST BP GE 130 - 139MM HG: CPT

## 2024-11-08 RX ORDER — LOSARTAN POTASSIUM 25 MG/1
25 TABLET ORAL DAILY
Qty: 30 TABLET | Refills: 0 | Status: SHIPPED | OUTPATIENT
Start: 2024-11-08

## 2024-11-08 RX ORDER — LOSARTAN POTASSIUM 25 MG/1
25 TABLET ORAL DAILY
Qty: 90 TABLET | Refills: 0 | OUTPATIENT
Start: 2024-11-08

## 2024-11-08 NOTE — PATIENT INSTRUCTIONS
Discussed that the feelings she is having can be a side effect of these medications and that they should dissipate with time as her body adjusts to them.     Will discontinue lisinopril.   Will start losartan 25mg. Ok to cut and do 1/2 in am and 1/2 in pm if she prefers. Ok to wait about one week unless you have home BP readings over 150/90.     Checked blood in office. Will get ahold of you with results.     Take 200mg ibuprofen today to see if it helps with symptoms.     Continue to stay hydrated.   Continue home monitoring of BP. Check 1-2 times daily at random times.   Follow up in 4 weeks.

## 2024-11-08 NOTE — TELEPHONE ENCOUNTER
Got a page from [facility] on 11/7/@ 8:41pm.   Pt was not feeling well still on lisinopril 10mg.   She had been cutting pills in half and taking 5mg in am and 5mg in pm the past few days, but feelings of \"woozy\" have continued. She does not like the way she feels on the medication. She reviewed her home BPs with me:   121/74  165/77  144/82  166/79  178/86  157/79 (right before she called)  She states she is staying hydrated.   She does not have a way to check BG.   I told her she can not take the lisinopril this am and come into the office to be seen.   I added to my schedule.

## 2024-11-08 NOTE — PROGRESS NOTES
Subjective:   Mary Robledo is a 69 year old female who presents for Medication Problem (Pt doesn't like how her BP meds make her feel. She sates they make her woozy.  )     I spoke to pt last night on phone. She has been having ongoing issues with feeling \"woozy\" on the lisinopril. She has tried taking half of her dose in am and half in pm and this did not help. At dinner last night, she felt faint and had to lie down. Feels that she is staying hydrated. Does not have a glucometer at home to rule out blood sugar issues. Below are the BP readings pt gave me over the phone from the past few days:   121/74  165/77  144/82  166/79  178/86  157/79 (right before she called service last night)    This morning, reading was @ 5:53 am 138/76  Woozy feeling is still present. Was able to do 4.0mph for 3miles on treadmill this am.   Had oatmeal with flaxseed and brown sugar this am. Was around 7:15am.   Has slight headache today.     Denies N/V/D/C     History/Other:    Chief Complaint Reviewed and Verified  Nursing Notes Reviewed and   Verified  Tobacco Reviewed  Allergies Reviewed  Medications Reviewed    Medical History Reviewed  Surgical History Reviewed  Family History   Reviewed  Social History Reviewed         Tobacco:  She has never smoked tobacco.    Current Outpatient Medications   Medication Sig Dispense Refill    losartan 25 MG Oral Tab Take 1 tablet (25 mg total) by mouth daily. 30 tablet 0    acidophilus-pectin Oral Cap Take 1 capsule by mouth daily. Florjen      ATORVASTATIN 10 MG Oral Tab TAKE 1 TABLET(10 MG) BY MOUTH EVERY NIGHT 90 tablet 1    Multiple Vitamins-Minerals (MULTI COMPLETE/IRON) Oral Tab Take by mouth.      Wheat Dextrin (BENEFIBER OR) Take by mouth. Once daily      Flaxseed, Linseed, (GROUND FLAX SEEDS OR) Take by mouth. Every other day.      clobetasol 0.05 % External Cream Apply 1 Application. topically 2 (two) times daily as needed. 30 g 1    Cholecalciferol (VITAMIN D3) 1000  units Oral Cap            Review of Systems:  Review of Systems   Constitutional: Negative.    HENT: Negative.     Eyes: Negative.    Respiratory:  Negative for cough and shortness of breath.    Cardiovascular:  Negative for chest pain.   Gastrointestinal: Negative.    Endocrine: Negative.    Genitourinary: Negative.    Musculoskeletal: Negative.    Skin: Negative.    Allergic/Immunologic: Negative.    Neurological:  Positive for dizziness, light-headedness and headaches.        Pt describes head sensation as woozy and tired. She said she has a slight headache today as well.    Hematological: Negative.    Psychiatric/Behavioral: Negative.         Objective:   /80 (BP Location: Right arm, Patient Position: Sitting, Cuff Size: adult)   Pulse 76   Resp 18   Ht 5' 4\" (1.626 m)   Wt 114 lb (51.7 kg)   BMI 19.57 kg/m²  Estimated body mass index is 19.57 kg/m² as calculated from the following:    Height as of this encounter: 5' 4\" (1.626 m).    Weight as of this encounter: 114 lb (51.7 kg).  Physical Exam  Vitals reviewed.   Constitutional:       General: She is not in acute distress.     Appearance: Normal appearance. She is normal weight. She is not ill-appearing, toxic-appearing or diaphoretic.   HENT:      Head: Normocephalic and atraumatic.   Eyes:      General: No scleral icterus.        Right eye: No discharge.         Left eye: No discharge.      Conjunctiva/sclera: Conjunctivae normal.   Cardiovascular:      Rate and Rhythm: Normal rate and regular rhythm.      Pulses: Normal pulses.      Heart sounds: Normal heart sounds. No murmur heard.     No friction rub. No gallop.   Pulmonary:      Effort: Pulmonary effort is normal. No respiratory distress.      Breath sounds: Normal breath sounds. No stridor. No wheezing, rhonchi or rales.   Chest:      Chest wall: No tenderness.   Musculoskeletal:      Cervical back: Normal range of motion.   Skin:     General: Skin is warm and dry.   Neurological:       General: No focal deficit present.      Mental Status: She is alert and oriented to person, place, and time.   Psychiatric:         Mood and Affect: Mood normal.         Behavior: Behavior normal.         Thought Content: Thought content normal.         Judgment: Judgment normal.         Assessment & Plan:   1. Primary hypertension (Primary)  -     Comp Metabolic Panel (14); Future; Expected date: 11/08/2024  -     Comp Metabolic Panel (14)  2. Dizziness  -     Cancel: HemoCue Glucose 201 (Glucose blood test)  -     Comp Metabolic Panel (14); Future; Expected date: 11/08/2024  -     Comp Metabolic Panel (14)  3. Lightheaded  -     Cancel: HemoCue Glucose 201 (Glucose blood test)  -     Comp Metabolic Panel (14); Future; Expected date: 11/08/2024  -     Comp Metabolic Panel (14)  4. Near syncope  -     Cancel: HemoCue Glucose 201 (Glucose blood test)  -     Comp Metabolic Panel (14); Future; Expected date: 11/08/2024  -     Comp Metabolic Panel (14)  5. Medication management  Other orders  -     Losartan Potassium; Take 1 tablet (25 mg total) by mouth daily.  Dispense: 30 tablet; Refill: 0    Discussed that the feelings she is having can be a side effect of these medications and that they should dissipate with time as her body adjusts to them.     Will discontinue lisinopril.   Will start losartan 25mg. Ok to cut and do 1/2 in am and 1/2 in pm if she prefers. Ok to wait about one week unless you have home BP readings over 150/90. (Pt would like to wait because she wants to hold off due to how she's feeling).     Checked CMP in office. Will get ahold of you with results.     Take 200mg ibuprofen today to see if it helps with symptoms.     Continue to stay hydrated.   Continue home monitoring of BP. Check 1-2 times daily at random times.   Follow up in 4 weeks.         Return in about 31 days (around 12/9/2024) for keep appointment with Dr Logan.    Elissa Jimenez, [email or web address], 11/8/2024, 9:22 AM

## 2024-11-09 ENCOUNTER — PATIENT MESSAGE (OUTPATIENT)
Dept: FAMILY MEDICINE CLINIC | Facility: CLINIC | Age: 69
End: 2024-11-09

## 2024-11-11 ENCOUNTER — OFFICE VISIT (OUTPATIENT)
Dept: PHYSICAL THERAPY | Age: 69
End: 2024-11-11
Attending: FAMILY MEDICINE
Payer: MEDICARE

## 2024-11-11 PROCEDURE — 97140 MANUAL THERAPY 1/> REGIONS: CPT

## 2024-11-11 PROCEDURE — 97110 THERAPEUTIC EXERCISES: CPT

## 2024-11-11 NOTE — PROGRESS NOTES
Progress Summary  Pt has attended 15 visits in Physical Therapy.     Diagnosis:    Trigger point with back pain, buttock pain      Referring Provider: Kathryn Logan  Date of Evaluation:   8/28/2024    Precautions:  Drug Allergy Next MD visit:   none scheduled  Date of Surgery: n/a   Insurance Primary/Secondary: [employer] / [facility]       # Auth Visits: [id number]         Treatment Number: 8/8 + 8 exp 11/13    Subjective: Pt reports feeling better today. Thinks the stretches have helped. Still getting symptoms on and off but stretches seem to help alleviate and has not had a strong flareup the past week. Last week was prescribed a different medication for her blood pressure. Seems to be doing better with this.   Pain: 4/10     Assessment: Mary has been seen for 15 sessions in PT. She has been compliant with attendance and HEP performance and motivated to improve. She is responding well to PT with improved postural awareness, strength and mobility. Demonstrates good correctional breathing although difficulty with combined full 3D correction. Improved spinal mobility and soft tissue extensibility. Remaining restrictions at thoracic paraspinals and QL may contribute to remaining restrictions. Improved overall strength and stability with decreased spinal compensation. Overall, pt is improving well towards goals. Recommend additional 4-6sessions to further decrease pain and independently manage symptoms with HEP.    Objective:      Posture assessment: R 3C scoliosis with rotation with associated posture deviations with trunk deviated R, pelvis deviated L, R shoulder rounded and protruded, pelvis deviated fwd and anteriorly tilted    Palpation: mild-moderate restrictions at R thoracic paraspinals greatest caudally    Zurita test: (+) R thoracic     LLD assessment: LLE mildly shorter    Scapular strength: grossly: 4-/5 (improved postural control with decreased cervical compensation)      Plan: Continue skilled  Physical Therapy 1 x/week, decreasing as indicated, or a total of 4-6 visits over a 90 day period. Treatment will include: therapeutic exercises, therapeutic activities, neuro re-ed, manual therapy, HEP       Patient was advised of these findings, precautions, and treatment options and has agreed to actively participate in planning and for this course of care.    Thank you for your referral. If you have any questions, please contact me at Dept: [phone].    Sincerely,  Electronically signed by therapist: Eduarda Angel, PT ,DPT,BSPTS-C2    Physician's certification required:  Yes  Please co-sign or sign and return this letter via fax as soon as possible to [phone].   I certify the need for these services furnished under this plan of treatment and while under my care.    X___________________________________________________ Date____________________    Certification From: 11/11/2024  To:2/9/2025       Treatment:     Date: 10/16/2024  TX#: 12/16 Date:    10/30/24  TX#: 13/16 Date: 11/6/24  Tx#: 14/16 Date: 11/11/24  Tx#: 15/16 Date: 10/14/24  Tx#: 11/16   Neuro re-ed: 10 mins  Supine diaphragmatic breathing  Standing pivot prone sustained hold  Standing isometric core press downs  Review of standing L lateral costal expansion  Instruction on use of tennis ball for self STM at thoracic region Neuro re-ed: 25mins  Supine isometric core with march   Standing isometric core press downs  Standing pivot prone holds  Standing L lateral costal expansion with maintenance on exhalation  Standing anterior costal expansion then added focus at R anterior expansion Neuro re-ed: 10mins  Standing R anterior costal expansion  Standing L lateral costal expansion  Standing scapular retraction/posture awareness with breathing corrections Neuro re-ed: 1mins  Review of correctional breathing techniques Neuro re-ed: 25mins  Supine diaphragmatic breathing  Seated L lateral expansion with maintenance on exhalation  Standing isometric  core press downs 65cm ball 10x  Seated tra + hip add with ball 5x  Standing wall angels then progress to pivot prone sustained holds 8x5sec     TherEx  Therex:3mins  Sidely thoracic rotations  Review of HEP Therex: 14mins  Standing pivot prone sustained hold then with rolled towel at lumbar  Self STM with FR horizontally across thoracic at wall  Lorraine pose diagonals with lateral expansion  Quadruped cat/cow Therex: 23mins  Lorraine pose diagonals with lateral expansion  Quadruped cat/cow  FR self roll out with FR  Standing against FR pec stretch/scap retract  Supine tra march with ball   Review of current HEP routine    Manual: 35mins  Supine diaphragmatic release  Prone: STM B thoracic/lumbar paraspinals, mid trap/rhomboid on R, R UT  Seated: TP release R UT, supraspinatus Manual: 15mins  Supine diaphragm release  Prone: STM R thoracic paraspinals Manual: 16mins  Prone STM B thoracic paraspinals, gentle thoracic PA and thoracolumbar and segmental lumbar distraction Manual: 18mins  Prone STM B thoracic paraspinals, gentle PA and thoracic/lumbar distraction   Manual: 15mins  Supine: diaphragmatic release  Prone: STM B thoracic paraspinals, gentle costal mobilizations              Goals:  (to be met in 8 visits)   Decrease pain L buttock to enable pt to walk and sit comfortably without pain  Decrease pain in sternum and mid thoracic spine to enable pt to lift daily objects up to 15#  Instruct pt in daily stretches to decrease thoracic spine tightness  Update 9/23  4. Pt will demo normalized diaphragmatic breathing  5. Pt will demo normalized and symmetrical lateral costal expansion  6. Pt will demo full and pain free thoracic mobility      HEP:  Hook lying LTR, bridging, bent leg fallout, s lying hip abd, s lying clam, s lying open book stretch, wall slides sh flexion  9/23: diaphragmatic breathing, L lateral expansion, L stretch at sink   9/25: seated positioning with pelvic correction, self elongation and L lateral  expansion with maintenance on exhalation  10/2: supine isometric core press downs 65cm ball, supine tra + hip add with ball  10/14: standing isometric core press down, seated hip add with ball, standing pivot prone holds  10/30: R anterior costal expansion, supine isometric core with march, sidely thoracic rotations  11/6: quadruped cat/cow, flor pose lateral, self STM with FR      Plan:Assess effect of HEP changes. Continue with manual interventions as indicated. Progress stability work.    Total Timed Treatment: 42min  Total Treatment time: 42min   Charges: , Manx1, therex x2

## 2024-11-11 NOTE — PROGRESS NOTES
Diagnosis:    Trigger point with back pain, buttock pain      Referring Provider: Kathryn Logan  Date of Evaluation:   8/28/2024    Precautions:  Drug Allergy Next MD visit:   none scheduled  Date of Surgery: n/a   Insurance Primary/Secondary: [employer] / [facility]       # Auth Visits: [id number]         Treatment Number: 8/8 + 8 exp 11/13    Subjective: Pt reports feeling better today. Thinks the stretches have helped. Last week was prescribed a different medication for her blood pressure. Seems to be doing better with this.   Pain: 4/10     Assessment: continued with manual interventions. Reviewed HEP and progressed mobility/stability work. Pt progressing well towards goals with improving independence with home program to manage symptoms.    Objective:      Posture assessment: R 3C scoliosis with rotation with associated posture deviations with trunk deviated R, pelvis deviated L, R shoulder rounded and protruded, pelvis deviated fwd and anteriorly tilted    Palpation: decreased diaphragmatic mobility, moderate restrictions at R thoracic paraspinals    Zurita test: (+) R thoracic     LLD assessment: LLE mildly shorter    Treatment:     Date: 10/16/2024  TX#: 12/16 Date:    10/30/24  TX#: 13/16 Date: 11/6/24  Tx#: 14/16 Date: 11/11/24  Tx#: 15/16 Date: 10/14/24  Tx#: 11/16   Neuro re-ed: 10 mins  Supine diaphragmatic breathing  Standing pivot prone sustained hold  Standing isometric core press downs  Review of standing L lateral costal expansion  Instruction on use of tennis ball for self STM at thoracic region Neuro re-ed: 25mins  Supine isometric core with march   Standing isometric core press downs  Standing pivot prone holds  Standing L lateral costal expansion with maintenance on exhalation  Standing anterior costal expansion then added focus at R anterior expansion Neuro re-ed: 10mins  Standing R anterior costal expansion  Standing L lateral costal expansion  Standing scapular retraction/posture  awareness with breathing corrections Neuro re-ed: 1mins  Review of correctional breathing techniques Neuro re-ed: 25mins  Supine diaphragmatic breathing  Seated L lateral expansion with maintenance on exhalation  Standing isometric core press downs 65cm ball 10x  Seated tra + hip add with ball 5x  Standing wall angels then progress to pivot prone sustained holds 8x5sec     TherEx  Therex:3mins  Sidely thoracic rotations  Review of HEP Therex: 14mins  Standing pivot prone sustained hold then with rolled towel at lumbar  Self STM with FR horizontally across thoracic at wall  Lorraine pose diagonals with lateral expansion  Quadruped cat/cow Therex: 23mins  Lorraine pose diagonals with lateral expansion  Quadruped cat/cow  FR self roll out with FR  Standing against FR pec stretch/scap retract  Supine tra march with ball   Review of current HEP routine    Manual: 35mins  Supine diaphragmatic release  Prone: STM B thoracic/lumbar paraspinals, mid trap/rhomboid on R, R UT  Seated: TP release R UT, supraspinatus Manual: 15mins  Supine diaphragm release  Prone: STM R thoracic paraspinals Manual: 16mins  Prone STM B thoracic paraspinals, gentle thoracic PA and thoracolumbar and segmental lumbar distraction Manual: 18mins  Prone STM B thoracic paraspinals, gentle PA   Manual: 15mins  Supine: diaphragmatic release  Prone: STM B thoracic paraspinals, gentle costal mobilizations              Goals:  (to be met in 8 visits)   Decrease pain L buttock to enable pt to walk and sit comfortably without pain  Decrease pain in sternum and mid thoracic spine to enable pt to lift daily objects up to 15#  Instruct pt in daily stretches to decrease thoracic spine tightness  Update 9/23  4. Pt will demo normalized diaphragmatic breathing  5. Pt will demo normalized and symmetrical lateral costal expansion  6. Pt will demo full and pain free thoracic mobility      HEP:  Hook lying LTR, bridging, bent leg fallout, s lying hip abd, s lying clam, s  lying open book stretch, wall slides sh flexion  9/23: diaphragmatic breathing, L lateral expansion, L stretch at sink   9/25: seated positioning with pelvic correction, self elongation and L lateral expansion with maintenance on exhalation  10/2: supine isometric core press downs 65cm ball, supine tra + hip add with ball  10/14: standing isometric core press down, seated hip add with ball, standing pivot prone holds  10/30: R anterior costal expansion, supine isometric core with march, sidely thoracic rotations  11/6: quadruped cat/cow, flor pose lateral, self STM with FR      Plan:Assess effect of HEP changes. Continue with manual interventions as indicated. Progress stability work.    Total Timed Treatment: 42min  Total Treatment time: 42min   Charges: , Manx1, therex x2

## 2024-11-12 ENCOUNTER — APPOINTMENT (OUTPATIENT)
Dept: PHYSICAL THERAPY | Facility: HOSPITAL | Age: 69
End: 2024-11-12
Attending: FAMILY MEDICINE
Payer: MEDICARE

## 2024-11-14 ENCOUNTER — TELEPHONE (OUTPATIENT)
Dept: FAMILY MEDICINE CLINIC | Facility: CLINIC | Age: 69
End: 2024-11-14

## 2024-11-14 NOTE — TELEPHONE ENCOUNTER
Spoke with pt, advised of Angélica's recommendations   Pt verbalized understanding, will follow up next week if still having symptoms

## 2024-11-14 NOTE — TELEPHONE ENCOUNTER
Since all BP meds can potentially cause dizziness and we changed her med last week, I'd like her to try to continue with the losartan for now if it's not too bothersome.     Please remind her to drink enough water and to rise and sit slowly until her body adjusts to the medication.     Has she tried a 200mg ibuprofen to see if the dizziness goes away or feels better? I had wanted her to try this just to see if it helps at all.     If this continues into next week, please have her let us know and not wait until the appt on 12/9.     Thank you.

## 2024-11-14 NOTE — TELEPHONE ENCOUNTER
Has been taking losartan 1/2 tab twice daily, started Saturday night   BP has been up and down, taking at random times   Was 126/80 this morning, BP ranging from 120-170s/80s this past week   Feels good for most of the day but still having random bouts of dizziness/woozy/lightheadedness - felt it when she woke up this morning and again around 12:30 while eating lunch   Pt hasn't noticed any correlation between time of day or what she's doing when symptoms come on   She did check it one time on Tuesday when she felt really bad and it was 161/81  Pt is wondering if she should continue to monitor symptoms for now?

## 2024-11-18 ENCOUNTER — OFFICE VISIT (OUTPATIENT)
Dept: PHYSICAL THERAPY | Age: 69
End: 2024-11-18
Attending: FAMILY MEDICINE
Payer: MEDICARE

## 2024-11-18 PROCEDURE — 97110 THERAPEUTIC EXERCISES: CPT

## 2024-11-18 PROCEDURE — 97140 MANUAL THERAPY 1/> REGIONS: CPT

## 2024-11-18 PROCEDURE — 97112 NEUROMUSCULAR REEDUCATION: CPT

## 2024-11-18 NOTE — PROGRESS NOTES
Diagnosis:    Trigger point with back pain, buttock pain      Referring Provider: Kathryn Logan  Date of Evaluation:   8/28/2024    Precautions:  Drug Allergy Next MD visit:   none scheduled  Date of Surgery: n/a   Insurance Primary/Secondary: [employer] / [facility]       # Auth Visits: [id number]         Treatment Number: 8/8 + 8 exp 11/13    Subjective: Pt reports feeling much better. Has occasional mild cramping in the front of her ribcage. Back feels tight at different times but overall much better.  Pain: 4/10     Assessment: Pt with improved overall soft tissue mobility. Greatest restriction into R thoracic paraspinals. Reviewed pt's previous exercise routine. Instructed on proper positioning/posture mechanics and advised pt she may re-initiate.    Objective:      Posture assessment: R 3C scoliosis with rotation with associated posture deviations with trunk deviated R, pelvis deviated L, R shoulder rounded and protruded, pelvis deviated fwd and anteriorly tilted    Palpation: mild-moderate restrictions at R thoracic paraspinals greatest caudally    Zurita test: (+) R thoracic     LLD assessment: LLE mildly shorter    Scapular strength: grossly: 4-/5 (improved postural control with decreased cervical compensation)      Treatment:     Date: 10/16/2024  TX#: 12/16 Date:    10/30/24  TX#: 13/16 Date: 11/6/24  Tx#: 14/16 Date: 11/11/24  Tx#: 15/16 Date: 11/18/24  Tx#: 16/21   Neuro re-ed: 10 mins  Supine diaphragmatic breathing  Standing pivot prone sustained hold  Standing isometric core press downs  Review of standing L lateral costal expansion  Instruction on use of tennis ball for self STM at thoracic region Neuro re-ed: 25mins  Supine isometric core with march   Standing isometric core press downs  Standing pivot prone holds  Standing L lateral costal expansion with maintenance on exhalation  Standing anterior costal expansion then added focus at R anterior expansion Neuro re-ed: 10mins  Standing R  anterior costal expansion  Standing L lateral costal expansion  Standing scapular retraction/posture awareness with breathing corrections Neuro re-ed: 1mins  Review of correctional breathing techniques Neuro re-ed: 11mins  Standing diaphragmatic breathing  Standing L lateral costal expansion  Standing R anterior expansion  Standing posture mechanics with ex's     TherEx  Therex:3mins  Sidely thoracic rotations  Review of HEP Therex: 14mins  Standing pivot prone sustained hold then with rolled towel at lumbar  Self STM with FR horizontally across thoracic at wall  Lorraine pose diagonals with lateral expansion  Quadruped cat/cow Therex: 23mins  Lorraine pose diagonals with lateral expansion  Quadruped cat/cow  FR self roll out with FR  Standing against FR pec stretch/scap retract  Supine tra march with ball   Review of current HEP routine Therex: 14mins  Lorraine pose fwd/diagonal with expansion  Quadruped cat/cow  Standing self STM with FR  Standing FR pec stretch/scap retract  Review of previous exercise routine: modified push up, plank elbows and feet, weighted bicep curls and overhead press   Manual: 35mins  Supine diaphragmatic release  Prone: STM B thoracic/lumbar paraspinals, mid trap/rhomboid on R, R UT  Seated: TP release R UT, supraspinatus Manual: 15mins  Supine diaphragm release  Prone: STM R thoracic paraspinals Manual: 16mins  Prone STM B thoracic paraspinals, gentle thoracic PA and thoracolumbar and segmental lumbar distraction Manual: 18mins  Prone STM B thoracic paraspinals, gentle PA and thoracic/lumbar distraction   Manual: 15mins  Supine: diaphragmatic release  Prone: STM B thoracic paraspinals, gentle costal mobilizations              Goals:  (to be met in 8 visits)   Decrease pain L buttock to enable pt to walk and sit comfortably without pain  Decrease pain in sternum and mid thoracic spine to enable pt to lift daily objects up to 15#  Instruct pt in daily stretches to decrease thoracic spine  tightness  Update 9/23  4. Pt will demo normalized diaphragmatic breathing  5. Pt will demo normalized and symmetrical lateral costal expansion  6. Pt will demo full and pain free thoracic mobility      HEP:  Hook lying LTR, bridging, bent leg fallout, s lying hip abd, s lying clam, s lying open book stretch, wall slides sh flexion  9/23: diaphragmatic breathing, L lateral expansion, L stretch at sink   9/25: seated positioning with pelvic correction, self elongation and L lateral expansion with maintenance on exhalation  10/2: supine isometric core press downs 65cm ball, supine tra + hip add with ball  10/14: standing isometric core press down, seated hip add with ball, standing pivot prone holds  10/30: R anterior costal expansion, supine isometric core with march, sidely thoracic rotations  11/6: quadruped cat/cow, flor pose lateral, self STM with FR  11/18: pt to return to home routine (weights, planks, modified push ups)      Plan:Assess effect of pt return to previous exercise routine. May dec frequency if symptoms controlled    Total Timed Treatment: 40min  Total Treatment time: 40min   Charges: , Manx1, therex x1, neuro re-ed x1

## 2024-11-19 ENCOUNTER — APPOINTMENT (OUTPATIENT)
Dept: PHYSICAL THERAPY | Facility: HOSPITAL | Age: 69
End: 2024-11-19
Attending: FAMILY MEDICINE
Payer: MEDICARE

## 2024-11-19 ENCOUNTER — TELEPHONE (OUTPATIENT)
Dept: FAMILY MEDICINE CLINIC | Facility: CLINIC | Age: 69
End: 2024-11-19

## 2024-11-19 NOTE — TELEPHONE ENCOUNTER
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

## 2024-11-19 NOTE — TELEPHONE ENCOUNTER
Pt started on losartan 25 mg 11/9, taking 1/2 tab BID  Still having bouts of dizziness - almost feels like she's going to pass out but hasn't actually  Felt like it was improving each day but still getting high BP readings  BP today at 2 pm was 142/79  Yesterday at 4:30 pm 152/77  Was advised by pharmacist to try full tab at night instead of splitting dose but also advised to f/u with PCP before making changes  Pt has not tried ibuprofen as advised by Angélica last week  Advised to try ibuprofen and let us know how that helps  Otherwise, Angélica pls advise next steps, thanks!

## 2024-11-21 NOTE — TELEPHONE ENCOUNTER
Let's wait to change med. Have her check only once daily at least one hour after medication and report the next 5 days of BPs to us next week.

## 2024-11-21 NOTE — TELEPHONE ENCOUNTER
Advised pt of Angélica's recs, verbalized understanding  Her  went and got her [employer], she will try drinking one every morning and monitor symptoms  Will update us next week with symptoms/BPs

## 2024-11-21 NOTE — TELEPHONE ENCOUNTER
Pt reports yesterday morning dizziness was really bad but got better through the day   Doesn't matter if she's standing or sitting but gets better when she walks around  BP this morning was 144/81 before meds this morning   Pt states she drinks 8 glasses of water per day  Advised to swap one of those glasses of water for something with electrolytes   Pt will try that, do you still want to change to new medication or wait and see if this helps first?

## 2024-11-21 NOTE — TELEPHONE ENCOUNTER
Please check with pt; she told me she was going to wait in between switching the lisinopril to the losartan. I'd like to know how she felt off of BP med.     IF she was still dizzy, likely not from the med.     If she was NOT dizzy and the dizziness restarted with losartan, then she can discontinue the losartan and switch to amlodipine 2.5mg daily #30. Please let her know this can cause leg swelling, but at a low-dose, I don't expect it to.     Thank you.

## 2024-11-26 ENCOUNTER — TELEPHONE (OUTPATIENT)
Dept: FAMILY MEDICINE CLINIC | Facility: CLINIC | Age: 69
End: 2024-11-26

## 2024-11-26 ENCOUNTER — APPOINTMENT (OUTPATIENT)
Dept: PHYSICAL THERAPY | Facility: HOSPITAL | Age: 69
End: 2024-11-26
Attending: FAMILY MEDICINE
Payer: MEDICARE

## 2024-11-26 DIAGNOSIS — R42 LIGHTHEADEDNESS: ICD-10-CM

## 2024-11-26 DIAGNOSIS — R03.0 ELEVATED BLOOD PRESSURE READING: ICD-10-CM

## 2024-11-26 DIAGNOSIS — R42 DIZZINESS: Primary | ICD-10-CM

## 2024-11-26 NOTE — TELEPHONE ENCOUNTER
Pt was advised on 11/21 to add electrolyte and monitor symptoms/BP   BP still elevated at 145/76 and still woozy

## 2024-11-26 NOTE — TELEPHONE ENCOUNTER
Please let pt know I ordered a 24 hour blood pressure monitor, kidney ultrasound, and lab work for her.     If she feels that the blood pressure medication is contributing to the feelings in her head, she can stop the losartan and do the blood pressure monitor I ordered, without it.     Please have her do those and we'll get back to her with results.     Make sure she is staying hydrated.     Also, we know her home cuff was high compared to our office manual cuff. Is she taking this into account when reporting values to us? Hers is 20-25 points higher.

## 2024-11-26 NOTE — TELEPHONE ENCOUNTER
Spoke with pt, advised of recommendations  She says the feeling is not constant, comes and goes, changes day to day  Today felt like she had longer periods of wooziness but yesterday had no episodes   Pt will call central scheduling to get these tests done ASAP

## 2024-11-26 NOTE — TELEPHONE ENCOUNTER
Patient is calling that her blood pressure is still high 145/76 and she is feeling woozy and she would like to know if she should continue tasking this blood pressure medicine or try something different

## 2024-11-27 ENCOUNTER — OFFICE VISIT (OUTPATIENT)
Dept: PHYSICAL THERAPY | Age: 69
End: 2024-11-27
Attending: FAMILY MEDICINE
Payer: MEDICARE

## 2024-11-27 PROCEDURE — 97112 NEUROMUSCULAR REEDUCATION: CPT

## 2024-11-27 PROCEDURE — 97140 MANUAL THERAPY 1/> REGIONS: CPT

## 2024-11-27 PROCEDURE — 97110 THERAPEUTIC EXERCISES: CPT

## 2024-11-27 NOTE — PROGRESS NOTES
Diagnosis:    Trigger point with back pain, buttock pain      Referring Provider: Kathryn Logan  Date of Evaluation:   8/28/2024    Precautions:  Drug Allergy Next MD visit:   none scheduled  Date of Surgery: n/a   Insurance Primary/Secondary: [employer] / [facility]       # Auth Visits: [id number]        Treatment Number: 8/8 + 8+6 exp 2/11    Subjective: Pt reports doing really well overall. Just this morning noticed some twinges in the right side ribcage and into her back  Pain: 4/10     Assessment: Pt with continued improvement in soft tissue mobility although remaining restrictions remain primarily at R thoracic paraspinals. Reviewed HEP and instructed pt on modifications to stretches and progressions for strengthening. Overall, pt progressing well towards goals.    Objective:    Not assessed  Posture assessment: R 3C scoliosis with rotation with associated posture deviations with trunk deviated R, pelvis deviated L, R shoulder rounded and protruded, pelvis deviated fwd and anteriorly tilted    Palpation: mild-moderate restrictions at R thoracic paraspinals greatest caudally    Zurita test: (+) R thoracic     LLD assessment: LLE mildly shorter    Scapular strength: grossly: 4-/5 (improved postural control with decreased cervical compensation)      Treatment:     Date: 11/27/24  TX#: 17/21 Date:    10/30/24  TX#: 13/16 Date: 11/6/24  Tx#: 14/16 Date: 11/11/24  Tx#: 15/16 Date: 11/18/24  Tx#: 16/21   Neuro re-ed: 10mins  Review of breathing techniques  Review of sitting/standing posture  Review of positioning/mechanics with additional home exercises Neuro re-ed: 25mins  Supine isometric core with march   Standing isometric core press downs  Standing pivot prone holds  Standing L lateral costal expansion with maintenance on exhalation  Standing anterior costal expansion then added focus at R anterior expansion Neuro re-ed: 10mins  Standing R anterior costal expansion  Standing L lateral costal  expansion  Standing scapular retraction/posture awareness with breathing corrections Neuro re-ed: 1mins  Review of correctional breathing techniques Neuro re-ed: 11mins  Standing diaphragmatic breathing  Standing L lateral costal expansion  Standing R anterior expansion  Standing posture mechanics with ex's     TherEx: 15mins  Review of HEP  Lateral stretch at wall with 5 breaths  Wall nazia back to foam roller - instructed on modification from pivot prone position  Supine tra march  Seated tra march  Review of additional home exercises Therex:3mins  Sidely thoracic rotations  Review of HEP Therex: 14mins  Standing pivot prone sustained hold then with rolled towel at lumbar  Self STM with FR horizontally across thoracic at wall  Lorraine pose diagonals with lateral expansion  Quadruped cat/cow Therex: 23mins  Lorraine pose diagonals with lateral expansion  Quadruped cat/cow  FR self roll out with FR  Standing against FR pec stretch/scap retract  Supine tra march with ball   Review of current HEP routine Therex: 14mins  Lorraine pose fwd/diagonal with expansion  Quadruped cat/cow  Standing self STM with FR  Standing FR pec stretch/scap retract  Review of previous exercise routine: modified push up, plank elbows and feet, weighted bicep curls and overhead press   Manual: 15mins  Prone: STM R thoracic paraspinals, gentle costal mobilizations on R into L rotation Manual: 15mins  Supine diaphragm release  Prone: STM R thoracic paraspinals Manual: 16mins  Prone STM B thoracic paraspinals, gentle thoracic PA and thoracolumbar and segmental lumbar distraction Manual: 18mins  Prone STM B thoracic paraspinals, gentle PA and thoracic/lumbar distraction   Manual: 15mins  Supine: diaphragmatic release  Prone: STM B thoracic paraspinals, gentle costal mobilizations              Goals:  (to be met in 8 visits)   Decrease pain L buttock to enable pt to walk and sit comfortably without pain  Decrease pain in sternum and mid thoracic spine  to enable pt to lift daily objects up to 15#  Instruct pt in daily stretches to decrease thoracic spine tightness  Update 9/23  4. Pt will demo normalized diaphragmatic breathing  5. Pt will demo normalized and symmetrical lateral costal expansion  6. Pt will demo full and pain free thoracic mobility      HEP:  Hook lying LTR, bridging, bent leg fallout, s lying hip abd, s lying clam, s lying open book stretch, wall slides sh flexion  9/23: diaphragmatic breathing, L lateral expansion, L stretch at sink   9/25: seated positioning with pelvic correction, self elongation and L lateral expansion with maintenance on exhalation  10/2: supine isometric core press downs 65cm ball, supine tra + hip add with ball  10/14: standing isometric core press down, seated hip add with ball, standing pivot prone holds  10/30: R anterior costal expansion, supine isometric core with march, sidely thoracic rotations  11/6: quadruped cat/cow, flor pose lateral, self STM with FR  11/18: pt to return to home routine (weights, planks, modified push ups)  11/27: d/c lfor pose and replace wiht standing lateral stretch; progress to seated tra march      Plan:Hold x2weeks for pt to assess independence with program. May discharge if symptoms remain controlled    Total Timed Treatment: 40min  Total Treatment time: 40min   Charges: , Manx1, therex x1, neuro re-ed x1

## 2024-12-03 ENCOUNTER — TELEPHONE (OUTPATIENT)
Dept: FAMILY MEDICINE CLINIC | Facility: CLINIC | Age: 69
End: 2024-12-03

## 2024-12-03 ENCOUNTER — APPOINTMENT (OUTPATIENT)
Dept: PHYSICAL THERAPY | Facility: HOSPITAL | Age: 69
End: 2024-12-03
Attending: FAMILY MEDICINE
Payer: MEDICARE

## 2024-12-03 NOTE — TELEPHONE ENCOUNTER
Pt saw podiatry on 11/19 for toe infection, used Epsom salt soaks and neosporin   She spoke with podiatry today because toes still look infected and they prescribed Augmentin  Pt asking if she should wait until after her 24 hr BP monitor is finished to start abx, advised pt not to wait to start abx  Pt also asking if she is fasting for lab draw tomorrow when she should take losartan - advised to take when she gets home from lab appt (appt is at 8:45 am)

## 2024-12-03 NOTE — TELEPHONE ENCOUNTER
Patient calling [facility] she is going for labs tomorrow and a blood pressure test but her toes are infected and she wants to make sure its ok to start the antibiotic while doing these and she has questions abot taking her losatan

## 2024-12-04 ENCOUNTER — LAB ENCOUNTER (OUTPATIENT)
Dept: LAB | Age: 69
End: 2024-12-04
Attending: FAMILY MEDICINE
Payer: MEDICARE

## 2024-12-04 DIAGNOSIS — E78.00 PURE HYPERCHOLESTEROLEMIA: ICD-10-CM

## 2024-12-04 DIAGNOSIS — R42 DIZZINESS: ICD-10-CM

## 2024-12-04 DIAGNOSIS — R42 LIGHTHEADEDNESS: ICD-10-CM

## 2024-12-04 DIAGNOSIS — Z00.00 LABORATORY EXAMINATION ORDERED AS PART OF A ROUTINE GENERAL MEDICAL EXAMINATION: ICD-10-CM

## 2024-12-04 DIAGNOSIS — Z79.899 MEDICATION MANAGEMENT: ICD-10-CM

## 2024-12-04 DIAGNOSIS — R03.0 ELEVATED BLOOD PRESSURE READING: ICD-10-CM

## 2024-12-04 LAB
ALBUMIN SERPL-MCNC: 4.3 G/DL (ref 3.2–4.8)
ALBUMIN/GLOB SERPL: 1.5 {RATIO} (ref 1–2)
ALP LIVER SERPL-CCNC: 78 U/L
ALT SERPL-CCNC: 20 U/L
ANION GAP SERPL CALC-SCNC: 9 MMOL/L (ref 0–18)
AST SERPL-CCNC: 40 U/L (ref ?–34)
BILIRUB SERPL-MCNC: 0.6 MG/DL (ref 0.2–1.1)
BUN BLD-MCNC: 17 MG/DL (ref 9–23)
CALCIUM BLD-MCNC: 10.3 MG/DL (ref 8.7–10.4)
CHLORIDE SERPL-SCNC: 98 MMOL/L (ref 98–112)
CHOLEST SERPL-MCNC: 169 MG/DL (ref ?–200)
CO2 SERPL-SCNC: 27 MMOL/L (ref 21–32)
CREAT BLD-MCNC: 0.8 MG/DL
EGFRCR SERPLBLD CKD-EPI 2021: 79 ML/MIN/1.73M2 (ref 60–?)
FASTING PATIENT LIPID ANSWER: YES
FASTING STATUS PATIENT QL REPORTED: YES
GLOBULIN PLAS-MCNC: 2.8 G/DL (ref 2–3.5)
GLUCOSE BLD-MCNC: 90 MG/DL (ref 70–99)
HDLC SERPL-MCNC: 80 MG/DL (ref 40–59)
LDLC SERPL CALC-MCNC: 80 MG/DL (ref ?–100)
NONHDLC SERPL-MCNC: 89 MG/DL (ref ?–130)
OSMOLALITY SERPL CALC.SUM OF ELEC: 279 MOSM/KG (ref 275–295)
POTASSIUM SERPL-SCNC: 4.1 MMOL/L (ref 3.5–5.1)
PROT SERPL-MCNC: 7.1 G/DL (ref 5.7–8.2)
SODIUM SERPL-SCNC: 134 MMOL/L (ref 136–145)
TRIGL SERPL-MCNC: 42 MG/DL (ref 30–149)
VLDLC SERPL CALC-MCNC: 7 MG/DL (ref 0–30)

## 2024-12-04 PROCEDURE — 80061 LIPID PANEL: CPT

## 2024-12-04 PROCEDURE — 80053 COMPREHEN METABOLIC PANEL: CPT

## 2024-12-04 PROCEDURE — 36415 COLL VENOUS BLD VENIPUNCTURE: CPT

## 2024-12-04 PROCEDURE — 83835 ASSAY OF METANEPHRINES: CPT

## 2024-12-05 ENCOUNTER — HOSPITAL ENCOUNTER (OUTPATIENT)
Dept: CV DIAGNOSTICS | Facility: HOSPITAL | Age: 69
Discharge: HOME | End: 2024-12-05
Payer: MEDICARE

## 2024-12-05 DIAGNOSIS — R03.0 ELEVATED BLOOD PRESSURE READING: ICD-10-CM

## 2024-12-05 DIAGNOSIS — R42 DIZZINESS: ICD-10-CM

## 2024-12-05 DIAGNOSIS — R42 LIGHTHEADEDNESS: ICD-10-CM

## 2024-12-05 PROCEDURE — 93788 AMBL BP MNTR W/SW A/R: CPT

## 2024-12-05 PROCEDURE — 93786 AMBL BP MNTR W/SW REC ONLY: CPT

## 2024-12-05 PROCEDURE — 93790 AMBL BP MNTR W/SW I&R: CPT

## 2024-12-06 RX ORDER — AMOXICILLIN AND CLAVULANATE POTASSIUM 500; 125 MG/1; MG/1
1 TABLET, FILM COATED ORAL 2 TIMES DAILY
COMMUNITY
Start: 2024-12-03

## 2024-12-09 ENCOUNTER — OFFICE VISIT (OUTPATIENT)
Dept: FAMILY MEDICINE CLINIC | Facility: CLINIC | Age: 69
End: 2024-12-09
Payer: COMMERCIAL

## 2024-12-09 VITALS
HEART RATE: 93 BPM | RESPIRATION RATE: 18 BRPM | WEIGHT: 112.13 LBS | OXYGEN SATURATION: 98 % | HEIGHT: 64 IN | BODY MASS INDEX: 19.14 KG/M2 | DIASTOLIC BLOOD PRESSURE: 64 MMHG | SYSTOLIC BLOOD PRESSURE: 112 MMHG

## 2024-12-09 DIAGNOSIS — R79.89 ELEVATED LFTS: ICD-10-CM

## 2024-12-09 DIAGNOSIS — E87.1 HYPONATREMIA: ICD-10-CM

## 2024-12-09 DIAGNOSIS — F41.9 ANXIETY: ICD-10-CM

## 2024-12-09 DIAGNOSIS — I10 PRIMARY HYPERTENSION: Primary | ICD-10-CM

## 2024-12-09 DIAGNOSIS — Z79.899 MEDICATION MANAGEMENT: ICD-10-CM

## 2024-12-09 DIAGNOSIS — R42 LIGHTHEADED: ICD-10-CM

## 2024-12-09 LAB
METANEPHRINE: 74.8 PG/ML
NORMETANEPHRINE: 247.3 PG/ML

## 2024-12-09 PROCEDURE — 99214 OFFICE O/P EST MOD 30 MIN: CPT | Performed by: FAMILY MEDICINE

## 2024-12-09 PROCEDURE — 3008F BODY MASS INDEX DOCD: CPT | Performed by: FAMILY MEDICINE

## 2024-12-09 PROCEDURE — 3078F DIAST BP <80 MM HG: CPT | Performed by: FAMILY MEDICINE

## 2024-12-09 PROCEDURE — 1159F MED LIST DOCD IN RCRD: CPT | Performed by: FAMILY MEDICINE

## 2024-12-09 PROCEDURE — 1170F FXNL STATUS ASSESSED: CPT | Performed by: FAMILY MEDICINE

## 2024-12-09 PROCEDURE — 1160F RVW MEDS BY RX/DR IN RCRD: CPT | Performed by: FAMILY MEDICINE

## 2024-12-09 PROCEDURE — 3074F SYST BP LT 130 MM HG: CPT | Performed by: FAMILY MEDICINE

## 2024-12-09 RX ORDER — PROPRANOLOL HYDROCHLORIDE 10 MG/1
10 TABLET ORAL DAILY
Qty: 90 TABLET | Refills: 0 | OUTPATIENT
Start: 2024-12-09

## 2024-12-09 RX ORDER — PROPRANOLOL HYDROCHLORIDE 10 MG/1
10 TABLET ORAL DAILY
Qty: 30 TABLET | Refills: 1 | Status: SHIPPED | OUTPATIENT
Start: 2024-12-09

## 2024-12-09 RX ORDER — LOSARTAN POTASSIUM 25 MG/1
25 TABLET ORAL DAILY
Qty: 30 TABLET | Refills: 0 | Status: SHIPPED | OUTPATIENT
Start: 2024-12-09

## 2024-12-09 NOTE — PROGRESS NOTES
Mary Robledo is a 69 year old female.  HPI:   Pt. Is here for med check.  Had the BP monitor.  Not sure if losartan is giving her lightheadedness.  BP was stable.  Not check BP at home.  Sister takes amlodipine 2.5 and propranolol 10 mg BID.  Dyslipidemia -- stable on atorvastatin; no side effects  LFT mildly elevated.  Does not drink or take tylenol.  Notes she is more anxious when checking her BP.  Notes she may be more anxious in the office.    ILD -- per pulmonary; Dr. Head    Meds reviewed.    Current Outpatient Medications   Medication Sig Dispense Refill    LOSARTAN 25 MG Oral Tab TAKE 1 TABLET(25 MG) BY MOUTH DAILY 30 tablet 0    amoxicillin clavulanate 500-125 MG Oral Tab Take 1 tablet by mouth 2 (two) times daily.      acidophilus-pectin Oral Cap Take 1 capsule by mouth daily. Florjen      ATORVASTATIN 10 MG Oral Tab TAKE 1 TABLET(10 MG) BY MOUTH EVERY NIGHT 90 tablet 1    Multiple Vitamins-Minerals (MULTI COMPLETE/IRON) Oral Tab Take by mouth.      Wheat Dextrin (BENEFIBER OR) Take by mouth. Once daily      Flaxseed, Linseed, (GROUND FLAX SEEDS OR) Take by mouth. Every other day.      clobetasol 0.05 % External Cream Apply 1 Application. topically 2 (two) times daily as needed. 30 g 1    Cholecalciferol (VITAMIN D3) 1000 units Oral Cap        No current facility-administered medications for this visit.      Allergies[1]   Past Medical History:    Amenorrhea    Anemia    Anxiety    Arthritis    Back pain    I currently am experiencing upper back pain.  In 2018 I was told I have spinal stenosis    Belching    Occasionally    Bloating    Occasionally    Blood in urine    Broken leg    Constipation    Currently take Benefiber daily    Diarrhea, unspecified    Occasionally    Diverticulosis    Dysmenorrhea    Dyspareunia    Flatulence/gas pain/belching    I don't think I have an abnormal amount    Frequent urination    Depends on the day and what I drink    Frozen shoulder    H/O bone scan     osteopenia    H1N1 influenza    Headache disorder    Rare    Heartburn    Infrequent    High cholesterol    Started taking statin drug possibly 3 years ago    History of pneumonia as a child    Hyperlipidemia    Irregular bowel habits    More regular in the last 2 years    Lichen sclerosus    Osteopenia    Pain in joints    Not sure, but if I've sat in a car for a long time I feel very stiff/sore until I walk around    Painful swallowing    Eating raw broccoli.  Don't eat it now so no painful swallowing    Pap smear for cervical cancer screening    wnl neg hpv 2014    Pneumonia due to organism    Problems with swallowing    I have trouble swallowing large vitamins/pills    Raynaud's disease    Sleep disturbance    Currently pain disturbs my sleep    Stress    I would term it mild    Urinary incontinence    Vestibulitis, vulvar    Vulvodynia    Wears glasses    Needed a prescription for distance      Social History:  Social History     Socioeconomic History    Marital status:    Tobacco Use    Smoking status: Never    Smokeless tobacco: Never   Vaping Use    Vaping status: Never Used   Substance and Sexual Activity    Alcohol use: No    Drug use: Never    Sexual activity: Never     Partners: Male   Other Topics Concern    Caffeine Concern Yes     Comment: occas green tea, occas chocolate, occas soda     Exercise Yes     Comment: treadmill daily    Seat Belt Yes        Results for orders placed or performed in visit on 12/04/24   Comp Metabolic Panel (14)    Collection Time: 12/04/24  8:43 AM   Result Value Ref Range    Glucose 90 70 - 99 mg/dL    Sodium 134 (L) 136 - 145 mmol/L    Potassium 4.1 3.5 - 5.1 mmol/L    Chloride 98 98 - 112 mmol/L    CO2 27.0 21.0 - 32.0 mmol/L    Anion Gap 9 0 - 18 mmol/L    BUN 17 9 - 23 mg/dL    Creatinine 0.80 0.55 - 1.02 mg/dL    Calcium, Total 10.3 8.7 - 10.4 mg/dL    Calculated Osmolality 279 275 - 295 mOsm/kg    eGFR-Cr 79 >=60 mL/min/1.73m2    AST 40 (H) <34 U/L    ALT 20  10 - 49 U/L    Alkaline Phosphatase 78 55 - 142 U/L    Bilirubin, Total 0.6 0.2 - 1.1 mg/dL    Total Protein 7.1 5.7 - 8.2 g/dL    Albumin 4.3 3.2 - 4.8 g/dL    Globulin  2.8 2.0 - 3.5 g/dL    A/G Ratio 1.5 1.0 - 2.0    Patient Fasting for CMP? Yes    Lipid Panel    Collection Time: 12/04/24  8:43 AM   Result Value Ref Range    Cholesterol, Total 169 <200 mg/dL    HDL Cholesterol 80 (H) 40 - 59 mg/dL    Triglycerides 42 30 - 149 mg/dL    LDL Cholesterol 80 <100 mg/dL    VLDL 7 0 - 30 mg/dL    Non HDL Chol 89 <130 mg/dL    Patient Fasting for Lipid? Yes        REVIEW OF SYSTEMS:   GENERAL: feels well otherwise  SKIN: denies any unusual skin lesions  LUNGS: denies shortness of breath with exertion  CARDIOVASCULAR: denies chest pain on exertion  GI: denies abdominal pain,denies heartburn  MUSCULOSKELETAL: denies back pain  EXTREMITIES:  No pain or numbness    EXAM:   /64   Pulse 93   Resp 18   Ht 5' 4\" (1.626 m)   Wt 112 lb 2 oz (50.9 kg)   SpO2 98%   BMI 19.25 kg/m²   GENERAL: well developed, well nourished,in no apparent distress  SKIN: no rashes,no suspicious lesions  HEENT: atraumatic, normocephalic,ears and throat are clear  NECK: supple,no adenopathy,no bruits  LUNGS: clear to auscultation  CARDIO: RRR without murmur  GI: soft, good BS's  EXTREMITIES: no cyanosis, clubbing or edema    ASSESSMENT AND PLAN:     Encounter Diagnoses   Name Primary?    Primary hypertension Yes    Lightheaded     Anxiety     Hyponatremia     Elevated LFTs     Medication management        Orders Placed This Encounter   Procedures    Comp Metabolic Panel (14) [E]       Meds & Refills for this Visit:  Requested Prescriptions     Signed Prescriptions Disp Refills    propranolol 10 MG Oral Tab 30 tablet 1     Sig: Take 1 tablet (10 mg total) by mouth daily.       Imaging & Consults:  None      Stop losartan.  Start propranolol 10 mg daily or 5 mg BID.  Monitor BP.  Await BP monitor results.  Repeat blood work in 2  weeks.  Consider changing atorvastatin if LFT remains elevated.  Advised electrolytes daily.  Monitor weight.  Monitor salt.  Advised Tdap -- new grand child in 5/2024.      The patient indicates understanding of these issues and agrees to the plan.  Return in about 3 weeks (around 12/30/2024) for med check 30.         [1]   Allergies  Allergen Reactions    Sulfa Drugs Cross Reactors      Many years ago, thinks was rash

## 2024-12-09 NOTE — TELEPHONE ENCOUNTER
Last office visit: 11/8/24   Protocol: pass  Requested medication(s) are due for refill today: yes  Requested medication(s) are on the active medication list same strength, form, dose/ sig: yes  Requested medication(s) are managed by provider: yes  Patient has already received a courtsey refill: no    NOV: 12/9/24    Asked to Return: 12/9/24

## 2024-12-10 ENCOUNTER — APPOINTMENT (OUTPATIENT)
Dept: PHYSICAL THERAPY | Facility: HOSPITAL | Age: 69
End: 2024-12-10
Attending: FAMILY MEDICINE
Payer: MEDICARE

## 2024-12-17 ENCOUNTER — APPOINTMENT (OUTPATIENT)
Dept: PHYSICAL THERAPY | Facility: HOSPITAL | Age: 69
End: 2024-12-17
Attending: FAMILY MEDICINE
Payer: MEDICARE

## 2024-12-18 ENCOUNTER — OFFICE VISIT (OUTPATIENT)
Dept: PHYSICAL THERAPY | Age: 69
End: 2024-12-18
Attending: FAMILY MEDICINE
Payer: MEDICARE

## 2024-12-18 PROCEDURE — 97140 MANUAL THERAPY 1/> REGIONS: CPT

## 2024-12-18 PROCEDURE — 97110 THERAPEUTIC EXERCISES: CPT

## 2024-12-18 NOTE — PROGRESS NOTES
Discharge Summary  Pt has attended 18 visits in Physical Therapy.     Diagnosis:    Trigger point with back pain, buttock pain      Referring Provider: Kathryn Logan  Date of Evaluation:   8/28/2024    Precautions:  Drug Allergy Next MD visit:   none scheduled  Date of Surgery: n/a   Insurance Primary/Secondary: [employer] / [facility]       # Auth Visits: 21        Treatment Number: 8/8 + 8+6 exp 2/11    Subjective: Pt feeling much better overall. If she lays on her side she can occasionally feel a little tightnes into her chest, but thinks its how she sleeps. No longer getting pain in her ribs. Once in a while gets some discomfort into her right shoulder blade area. Exercises continue to help. Overall feels much better and can do all normal daily activity, although is cautious with heavier activity.  Pain: 0/10     Assessment: Mary has been seen for 18 sessions in PT. She has been compliant with attendance and HEP performance. She presents with significant improvement in overall posture awareness and stability. Good trunk mobility. Good overall strength on resisted testing, significantly improved from initial. At this time, pt has improved well towards goals. Recommend discharge to home program. Advised pt to call with any future questions or concerns. Pt in agreement with plan.     Objective:    Zurita test: (+) R thoracic     Lumbar spine AROM: (* denotes performed with pain)  Flexion: reach to floor, R rib hump (scoliosis thoracic spine)  Extension: no loss  Sidebending: R loss; L no loss  Rotation: R no loss; L no loss     Palpation: Ttp mid thoracic paraspinals R>L with restrictions (improved from initial)     Strength: Upper extremity 4/5                   Lower extremity 5/5   Scapular: 4/5     Gait: pt ambulates on level ground with normal mechanics.     Post Oswestry Disability Index Score  Post Score: 2 % (12/18/2024 10:52 AM)    22 % improvement    Plan: Discharge to home program    Patient  was advised of these findings, precautions, and treatment options and has agreed to actively participate in planning and for this course of care.    Thank you for your referral. If you have any questions, please contact me at Dept: [phone].    Sincerely,  Electronically signed by therapist: Eduarda Angel, PT ,DPT,BSPTS-C2    Physician's certification required:  No  Please co-sign or sign and return this letter via fax as soon as possible to [phone].   I certify the need for these services furnished under this plan of treatment and while under my care.    X___________________________________________________ Date____________________    Certification From: 12/18/2024  To:3/18/2025       Treatment:     Date: 11/27/24  TX#: 17/21 Date:    12/18/24  TX#: 18/21 Date: 11/6/24  Tx#: 14/16 Date: 11/11/24  Tx#: 15/16 Date: 11/18/24  Tx#: 16/21   Neuro re-ed: 10mins  Review of breathing techniques  Review of sitting/standing posture  Review of positioning/mechanics with additional home exercises Neuro re-ed:2mins  Review of posture/breathing mechanics Neuro re-ed: 10mins  Standing R anterior costal expansion  Standing L lateral costal expansion  Standing scapular retraction/posture awareness with breathing corrections Neuro re-ed: 1mins  Review of correctional breathing techniques Neuro re-ed: 11mins  Standing diaphragmatic breathing  Standing L lateral costal expansion  Standing R anterior expansion  Standing posture mechanics with ex's     TherEx: 15mins  Review of HEP  Lateral stretch at wall with 5 breaths  Wall nazia back to foam roller - instructed on modification from pivot prone position  Supine tra march  Seated tra march  Review of additional home exercises Therex: 25mins  Reassessment testing  Review of current HEP  Wall sits with tra and scapular activation  Fwd plank elbows and feet with neutral spine  Fwd plank progression on 65cm SB  Supine tra march with small ball with progression to tabletop  position Therex: 14mins  Standing pivot prone sustained hold then with rolled towel at lumbar  Self STM with FR horizontally across thoracic at wall  Lorraine pose diagonals with lateral expansion  Quadruped cat/cow Therex: 23mins  Lorraine pose diagonals with lateral expansion  Quadruped cat/cow  FR self roll out with FR  Standing against FR pec stretch/scap retract  Supine tra march with ball   Review of current HEP routine Therex: 14mins  Lorraine pose fwd/diagonal with expansion  Quadruped cat/cow  Standing self STM with FR  Standing FR pec stretch/scap retract  Review of previous exercise routine: modified push up, plank elbows and feet, weighted bicep curls and overhead press   Manual: 15mins  Prone: STM R thoracic paraspinals, gentle costal mobilizations on R into L rotation Manual: 11mins  Prone: STM B thoracic paraspinals, gentle costal mobilizations/assessment of mobility Manual: 16mins  Prone STM B thoracic paraspinals, gentle thoracic PA and thoracolumbar and segmental lumbar distraction Manual: 18mins  Prone STM B thoracic paraspinals, gentle PA and thoracic/lumbar distraction   Manual: 15mins  Supine: diaphragmatic release  Prone: STM B thoracic paraspinals, gentle costal mobilizations              Goals:  (to be met in 8 visits)   Decrease pain L buttock to enable pt to walk and sit comfortably without pain  Decrease pain in sternum and mid thoracic spine to enable pt to lift daily objects up to 15#  Instruct pt in daily stretches to decrease thoracic spine tightness  Update 9/23  4. Pt will demo normalized diaphragmatic breathing  5. Pt will demo normalized and symmetrical lateral costal expansion  6. Pt will demo full and pain free thoracic mobility      HEP:  Hook lying LTR, bridging, bent leg fallout, s lying hip abd, s lying clam, s lying open book stretch, wall slides sh flexion  9/23: diaphragmatic breathing, L lateral expansion, L stretch at sink   9/25: seated positioning with pelvic correction,  self elongation and L lateral expansion with maintenance on exhalation  10/2: supine isometric core press downs 65cm ball, supine tra + hip add with ball  10/14: standing isometric core press down, seated hip add with ball, standing pivot prone holds  10/30: R anterior costal expansion, supine isometric core with march, sidely thoracic rotations  11/6: quadruped cat/cow, flor pose lateral, self STM with FR  11/18: pt to return to home routine (weights, planks, modified push ups)  11/27: d/c flor pose and replace wiht standing lateral stretch; progress to seated tra march    Total Timed Treatment: 38min  Total Treatment time: 38min   Charges: , Manx1, therex x2

## 2024-12-23 ENCOUNTER — LAB ENCOUNTER (OUTPATIENT)
Dept: LAB | Age: 69
End: 2024-12-23
Attending: FAMILY MEDICINE
Payer: MEDICARE

## 2024-12-23 DIAGNOSIS — R79.89 ELEVATED LFTS: ICD-10-CM

## 2024-12-23 DIAGNOSIS — E87.1 HYPONATREMIA: ICD-10-CM

## 2024-12-23 LAB
ALBUMIN SERPL-MCNC: 4.2 G/DL (ref 3.2–4.8)
ALBUMIN/GLOB SERPL: 1.4 {RATIO} (ref 1–2)
ALP LIVER SERPL-CCNC: 77 U/L
ALT SERPL-CCNC: 21 U/L
ANION GAP SERPL CALC-SCNC: 8 MMOL/L (ref 0–18)
AST SERPL-CCNC: 41 U/L (ref ?–34)
BILIRUB SERPL-MCNC: 0.6 MG/DL (ref 0.2–1.1)
BUN BLD-MCNC: 19 MG/DL (ref 9–23)
CALCIUM BLD-MCNC: 9.6 MG/DL (ref 8.7–10.4)
CHLORIDE SERPL-SCNC: 102 MMOL/L (ref 98–112)
CO2 SERPL-SCNC: 28 MMOL/L (ref 21–32)
CREAT BLD-MCNC: 0.84 MG/DL
EGFRCR SERPLBLD CKD-EPI 2021: 75 ML/MIN/1.73M2 (ref 60–?)
FASTING STATUS PATIENT QL REPORTED: YES
GLOBULIN PLAS-MCNC: 3.1 G/DL (ref 2–3.5)
GLUCOSE BLD-MCNC: 81 MG/DL (ref 70–99)
OSMOLALITY SERPL CALC.SUM OF ELEC: 287 MOSM/KG (ref 275–295)
POTASSIUM SERPL-SCNC: 4.6 MMOL/L (ref 3.5–5.1)
PROT SERPL-MCNC: 7.3 G/DL (ref 5.7–8.2)
SODIUM SERPL-SCNC: 138 MMOL/L (ref 136–145)

## 2024-12-23 PROCEDURE — 80053 COMPREHEN METABOLIC PANEL: CPT

## 2024-12-23 PROCEDURE — 36415 COLL VENOUS BLD VENIPUNCTURE: CPT

## 2024-12-31 ENCOUNTER — HOSPITAL ENCOUNTER (OUTPATIENT)
Dept: ULTRASOUND IMAGING | Age: 69
Discharge: HOME | End: 2024-12-31
Payer: MEDICARE

## 2024-12-31 ENCOUNTER — APPOINTMENT (OUTPATIENT)
Dept: PHYSICAL THERAPY | Facility: HOSPITAL | Age: 69
End: 2024-12-31
Attending: FAMILY MEDICINE
Payer: MEDICARE

## 2024-12-31 ENCOUNTER — OFFICE VISIT (OUTPATIENT)
Dept: FAMILY MEDICINE CLINIC | Facility: CLINIC | Age: 69
End: 2024-12-31
Payer: COMMERCIAL

## 2024-12-31 VITALS
HEIGHT: 64 IN | HEART RATE: 70 BPM | BODY MASS INDEX: 19.29 KG/M2 | DIASTOLIC BLOOD PRESSURE: 70 MMHG | RESPIRATION RATE: 16 BRPM | WEIGHT: 113 LBS | OXYGEN SATURATION: 96 % | SYSTOLIC BLOOD PRESSURE: 122 MMHG

## 2024-12-31 DIAGNOSIS — M43.16 SPONDYLOLISTHESIS OF LUMBAR REGION: ICD-10-CM

## 2024-12-31 DIAGNOSIS — Z78.0 MENOPAUSE: ICD-10-CM

## 2024-12-31 DIAGNOSIS — M85.88 OSTEOPENIA OF SPINE: ICD-10-CM

## 2024-12-31 DIAGNOSIS — R91.8 MULTIPLE NODULES OF LUNG: ICD-10-CM

## 2024-12-31 DIAGNOSIS — Z79.899 MEDICATION MANAGEMENT: ICD-10-CM

## 2024-12-31 DIAGNOSIS — R42 DIZZINESS: ICD-10-CM

## 2024-12-31 DIAGNOSIS — Z01.818 PREOP EXAMINATION: Primary | ICD-10-CM

## 2024-12-31 DIAGNOSIS — I10 PRIMARY HYPERTENSION: ICD-10-CM

## 2024-12-31 DIAGNOSIS — J84.9 INTERSTITIAL LUNG DISEASE (HCC): ICD-10-CM

## 2024-12-31 DIAGNOSIS — R03.0 ELEVATED BLOOD PRESSURE READING: ICD-10-CM

## 2024-12-31 DIAGNOSIS — Z13.89 SCREENING FOR GENITOURINARY CONDITION: ICD-10-CM

## 2024-12-31 DIAGNOSIS — E55.9 VITAMIN D DEFICIENCY: ICD-10-CM

## 2024-12-31 DIAGNOSIS — R42 LIGHTHEADEDNESS: ICD-10-CM

## 2024-12-31 DIAGNOSIS — F41.9 ANXIETY: ICD-10-CM

## 2024-12-31 DIAGNOSIS — K21.9 GASTROESOPHAGEAL REFLUX DISEASE, UNSPECIFIED WHETHER ESOPHAGITIS PRESENT: ICD-10-CM

## 2024-12-31 DIAGNOSIS — E78.00 PURE HYPERCHOLESTEROLEMIA: ICD-10-CM

## 2024-12-31 DIAGNOSIS — Z00.00 LABORATORY EXAMINATION ORDERED AS PART OF A ROUTINE GENERAL MEDICAL EXAMINATION: ICD-10-CM

## 2024-12-31 PROCEDURE — 3008F BODY MASS INDEX DOCD: CPT | Performed by: FAMILY MEDICINE

## 2024-12-31 PROCEDURE — 1160F RVW MEDS BY RX/DR IN RCRD: CPT | Performed by: FAMILY MEDICINE

## 2024-12-31 PROCEDURE — 1170F FXNL STATUS ASSESSED: CPT | Performed by: FAMILY MEDICINE

## 2024-12-31 PROCEDURE — 99499 UNLISTED E&M SERVICE: CPT | Performed by: FAMILY MEDICINE

## 2024-12-31 PROCEDURE — 76775 US EXAM ABDO BACK WALL LIM: CPT

## 2024-12-31 PROCEDURE — 3078F DIAST BP <80 MM HG: CPT | Performed by: FAMILY MEDICINE

## 2024-12-31 PROCEDURE — 99214 OFFICE O/P EST MOD 30 MIN: CPT | Performed by: FAMILY MEDICINE

## 2024-12-31 PROCEDURE — 3074F SYST BP LT 130 MM HG: CPT | Performed by: FAMILY MEDICINE

## 2024-12-31 PROCEDURE — 93975 VASCULAR STUDY: CPT

## 2024-12-31 PROCEDURE — 1159F MED LIST DOCD IN RCRD: CPT | Performed by: FAMILY MEDICINE

## 2024-12-31 NOTE — H&P
Mary Robledo is a 69 year old female who presents for a pre-operative physical exam. Patient is to have total excision of nail and matrix to the right great toe, right second toe, and right fourth toe, to be done by Dr. Whitten at [facility] on January 16, 2025.      HPI:   Pt complains of ingrown toenails.    Current Outpatient Medications   Medication Sig Dispense Refill    propranolol 10 MG Oral Tab Take 1 tablet (10 mg total) by mouth daily. 30 tablet 1    acidophilus-pectin Oral Cap Take 1 capsule by mouth daily. Florjen      ATORVASTATIN 10 MG Oral Tab TAKE 1 TABLET(10 MG) BY MOUTH EVERY NIGHT 90 tablet 1    Multiple Vitamins-Minerals (MULTI COMPLETE/IRON) Oral Tab Take by mouth.      Wheat Dextrin (BENEFIBER OR) Take by mouth. Once daily      Flaxseed, Linseed, (GROUND FLAX SEEDS OR) Take by mouth. Every other day.      Cholecalciferol (VITAMIN D3) 1000 units Oral Cap         Allergies: Allergies[1]   Past Medical History:    Amenorrhea    Anemia    Anxiety    Arthritis    Back pain    I currently am experiencing upper back pain.  In 2018 I was told I have spinal stenosis    Belching    Occasionally    Bloating    Occasionally    Blood in urine    Broken leg    Constipation    Currently take Benefiber daily    Diarrhea, unspecified    Occasionally    Diverticulosis    Dysmenorrhea    Dyspareunia    Flatulence/gas pain/belching    I don't think I have an abnormal amount    Frequent urination    Depends on the day and what I drink    Frozen shoulder    H/O bone scan    osteopenia    H1N1 influenza    Headache disorder    Rare    Heartburn    Infrequent    High cholesterol    Started taking statin drug possibly 3 years ago    History of pneumonia as a child    Hyperlipidemia    Irregular bowel habits    More regular in the last 2 years    Lichen sclerosus    Osteopenia    Pain in joints    Not sure, but if I've sat in a car for a long time I feel very stiff/sore until I walk around     Painful swallowing    Eating raw broccoli.  Don't eat it now so no painful swallowing    Pap smear for cervical cancer screening    wnl neg hpv     Pneumonia due to organism    Problems with swallowing    I have trouble swallowing large vitamins/pills    Raynaud's disease    Sleep disturbance    Currently pain disturbs my sleep    Stress    I would term it mild    Urinary incontinence    Vestibulitis, vulvar    Vulvodynia    Wears glasses    Needed a prescription for distance      Past Surgical History:   Procedure Laterality Date    Biopsy of breast, needle core  2002    Colonoscopy   &     Colposcopy, cervix w/upper adjacent vagina; w/biopsy(s), cervix   &     Cryocautery of cervix   &     Kirill biopsy stereo nodule 1 site left (cpt=19081)      Kirill biopsy stereo nodule 2 site bilat (cpt=19081/92537)       benign    Nail removal  2014      1984, ,     Other surgical history      cryosurgeries x 2    Other surgical history      stereotactic biopsy      Family History   Problem Relation Age of Onset    Heart Disorder Father         cardiac stent    Hypertension Father     Lipids Father     Other (Other) Father         nephrolithiasis    Dementia Father     Hypertension Mother     Other (Other) Mother         dementia    Dementia Mother     Diabetes Maternal Grandmother         No    Heart Disorder Maternal Grandmother         No    Hypertension Maternal Grandmother         No    Other (Other) Maternal Grandmother         leukemia    Diabetes Maternal Grandfather     Heart Disorder Maternal Grandfather     Hypertension Maternal Grandfather     Stroke Maternal Grandfather     Heart Disorder Maternal Grandfather         No    Hypertension Maternal Grandfather         Not known    Diabetes Paternal Grandmother     Other (Other) Paternal Grandmother         dementia, glaucoma    Diabetes Paternal Grandfather         No    Heart Disorder Paternal Grandfather          MI?    Hypertension Sister     Other (Other) Brother         parkinsons    Asthma Brother     Depression Brother     Colon Cancer Maternal Aunt         Probably got it in her 60's, but was cured    Stroke Maternal Aunt         She survived it and passed away at age 90    Stroke Maternal Aunt     Breast Cancer Maternal Cousin Female         her  50's    Breast Cancer Paternal Cousin Female         her40's    Hypertension Maternal Cousin     Other (Other) Other         leukemia      Social History:   Social History     Socioeconomic History    Marital status:    Tobacco Use    Smoking status: Never    Smokeless tobacco: Never   Vaping Use    Vaping status: Never Used   Substance and Sexual Activity    Alcohol use: No    Drug use: Never    Sexual activity: Never     Partners: Male   Other Topics Concern    Caffeine Concern Yes     Comment: occas green tea, occas chocolate, occas soda     Exercise Yes     Comment: treadmill daily    Seat Belt Yes      Occ: retired. : y. Children: y.   Exercise: walking.  Diet: watches fats closely, watches sugar closely, and watches calories closely     REVIEW OF SYSTEMS:   GENERAL: feels well otherwise  SKIN: denies any unusual skin lesions  EYES:denies blurred vision or double vision  HEENT: denies nasal congestion, sinus pain or ST  LUNGS: denies shortness of breath with exertion  CARDIOVASCULAR: denies chest pain on exertion  GI: denies abdominal pain,denies heartburn  : denies dysuria, vaginal discharge or itching,periods -- menoapause  MUSCULOSKELETAL: denies back pain  NEURO: denies headaches  PSYCHE: denies depression; anxiety  HEMATOLOGIC: denies hx of anemia  ENDOCRINE: thyroid history  ALL/ASTHMA: denies hx of allergy or asthma    EXAM:   /70 (BP Location: Left arm, Patient Position: Sitting, Cuff Size: adult)   Pulse 70   Resp 16   Ht 5' 4\" (1.626 m)   Wt 113 lb (51.3 kg)   SpO2 96%   BMI 19.40 kg/m²   GENERAL: well developed, well  nourished,in no apparent distress  SKIN: no rashes,no suspicious lesions  HEENT: atraumatic, normocephalic,ears and throat are clear  EYES:PERRLA, EOMI, conjunctiva are clear  NECK: supple,no adenopathy,no bruits; thyroid nodule  CHEST: no chest tenderness  BREAST: DEFERRED  LUNGS: clear to auscultation  CARDIO: RRR without murmur  GI: good BS's,no masses, HSM or tenderness  : DEFERRED  MUSCULOSKELETAL: back is not tender,FROM of the back  EXTREMITIES: no cyanosis, clubbing or edema; toenail fungus noted on digits 1 2 and 4  NEURO: Oriented times three,cranial nerves are intact,motor and sensory are grossly intact    ASSESSMENT AND PLAN:   Mary Robledo is a 69 year old female who presents for a pre-operative physical exam. Patient is to have total excision of nail and matrix to the right great toe, right second toe, and right fourth toe, to be done by Dr. Rachel at [facility] on January 16, 2025. Pt has the following conditions:   Encounter Diagnoses   Name Primary?    Preop examination Yes    Pure hypercholesterolemia     Primary hypertension     Interstitial lung disease (HCC)     Anxiety     Gastroesophageal reflux disease, unspecified whether esophagitis present     Multiple nodules of lung     Osteopenia of spine     Spondylolisthesis of lumbar region     Vitamin D deficiency     Medication management     Menopause     Screening for genitourinary condition     Laboratory examination ordered as part of a routine general medical examination        Orders Placed This Encounter   Procedures    CBC With Differential With Platelet    Comp Metabolic Panel (14)    TSH W Reflex To Free T4    Lipid Panel    Vitamin D, 25-Hydroxy    Urinalysis with Culture Reflex       Meds & Refills for this Visit:  Requested Prescriptions      No prescriptions requested or ordered in this encounter       Imaging & Consults:  XR DEXA BONE DENSITOMETRY (CPT=77080)      Pt has no significant history of cardiac  or pulmonary conditions. Pt is a good surgical candidate. This consult was sent back the referring physician, Dr. Whitten.        [1]   Allergies  Allergen Reactions    Sulfa Drugs Cross Reactors      Many years ago, thinks was rash

## 2025-01-03 ENCOUNTER — TELEPHONE (OUTPATIENT)
Dept: FAMILY MEDICINE CLINIC | Facility: CLINIC | Age: 70
End: 2025-01-03

## 2025-01-07 ENCOUNTER — TELEPHONE (OUTPATIENT)
Dept: FAMILY MEDICINE CLINIC | Facility: CLINIC | Age: 70
End: 2025-01-07

## 2025-02-03 RX ORDER — PROPRANOLOL HYDROCHLORIDE 10 MG/1
10 TABLET ORAL DAILY
Qty: 30 TABLET | Refills: 1 | Status: SHIPPED | OUTPATIENT
Start: 2025-02-03 | End: 2025-02-03

## 2025-02-03 RX ORDER — PROPRANOLOL HYDROCHLORIDE 10 MG/1
10 TABLET ORAL DAILY
Qty: 90 TABLET | Refills: 1 | Status: SHIPPED | OUTPATIENT
Start: 2025-02-03

## 2025-02-03 RX ORDER — PROPRANOLOL HYDROCHLORIDE 10 MG/1
10 TABLET ORAL DAILY
Qty: 30 TABLET | Refills: 1 | OUTPATIENT
Start: 2025-02-03

## 2025-02-03 NOTE — TELEPHONE ENCOUNTER
LOV 12/31/24  NOV 6/9/25    30 day supply with 1 refill was sent this morning but patient is requesting 90 day supply  Rx pended with updated qty for approval, thanks!

## 2025-02-03 NOTE — TELEPHONE ENCOUNTER
Patient calling to ask if we can send in a script for PROPRANOLOL 10 MG Oral Tab for 90 day supply to [facility]

## 2025-02-03 NOTE — TELEPHONE ENCOUNTER
Last office visit: 12/31/24   Protocol: pass  Requested medication(s) are due for refill today: yes  Requested medication(s) are on the active medication list same strength, form, dose/ sig: yes  Requested medication(s) are managed by provider: yes  Patient has already received a courtsey refill: no    NOV: 6/9/25    Asked to Return: 6/30/25

## 2025-03-22 DIAGNOSIS — E78.00 PURE HYPERCHOLESTEROLEMIA: ICD-10-CM

## 2025-03-22 DIAGNOSIS — Z79.899 ON STATIN THERAPY: ICD-10-CM

## 2025-03-24 RX ORDER — ATORVASTATIN CALCIUM 10 MG/1
10 TABLET, FILM COATED ORAL NIGHTLY
Qty: 90 TABLET | Refills: 1 | Status: SHIPPED | OUTPATIENT
Start: 2025-03-24

## 2025-03-24 NOTE — TELEPHONE ENCOUNTER
Last office visit: 12/31/2024   Protocol: PASS  Requested medication(s) are due for refill today: YES  Requested medication(s) are on the active medication list same strength, form, dose/ sig: YES  Requested medication(s) are managed by provider: YES  Patient has already received a courtsey refill: NO    NOV: 6/9/2025  Last Labs: 12/23/2024  Asked to Return: 6/30/2025

## 2025-04-07 ENCOUNTER — RT VISIT (OUTPATIENT)
Dept: RESPIRATORY THERAPY | Facility: HOSPITAL | Age: 70
End: 2025-04-07
Attending: INTERNAL MEDICINE
Payer: MEDICARE

## 2025-04-07 DIAGNOSIS — J84.9 ILD (INTERSTITIAL LUNG DISEASE) (HCC): ICD-10-CM

## 2025-04-07 PROCEDURE — 94726 PLETHYSMOGRAPHY LUNG VOLUMES: CPT

## 2025-04-07 PROCEDURE — 94010 BREATHING CAPACITY TEST: CPT

## 2025-04-07 PROCEDURE — 94729 DIFFUSING CAPACITY: CPT

## 2025-04-09 ENCOUNTER — TELEMEDICINE (OUTPATIENT)
Facility: CLINIC | Age: 70
End: 2025-04-09
Payer: COMMERCIAL

## 2025-04-09 DIAGNOSIS — J84.9 ILD (INTERSTITIAL LUNG DISEASE) (HCC): ICD-10-CM

## 2025-04-09 DIAGNOSIS — J47.9 BRONCHIECTASIS WITHOUT COMPLICATION (HCC): Primary | ICD-10-CM

## 2025-04-09 PROCEDURE — 1159F MED LIST DOCD IN RCRD: CPT

## 2025-04-09 PROCEDURE — 99214 OFFICE O/P EST MOD 30 MIN: CPT

## 2025-04-09 PROCEDURE — 1160F RVW MEDS BY RX/DR IN RCRD: CPT

## 2025-04-09 NOTE — PATIENT INSTRUCTIONS
Call office with any questions or concerns  Call office if develop any new or worsening respiratory symptoms.   Call central scheduling at [phone] to schedule the PFT

## 2025-04-09 NOTE — PROGRESS NOTES
[facility] General Pulmonary Progress Note    History of Present Illness:  Mary Robledo is a 69 year old female never smoker with significant PMH raynauds who presents today for follow up of PFT.   Overall feels much better since last visit. Last year was suffering from costochondritis. Went to acupuncture and breathing exercises and feels much better. Reports/ Denies acute concerns. Denies no cough, dyspnea, chest pain/pressure, wheezing, no fevers, chills, fatigue.      Previously 5/2024 Dr Head  Mary Robledo is a 68 year old female never smoker with PMH raynauds who was seen today for follow up of possible ILD and bronchiectasis. Since last visit she feels well. Denies any cough, dyspnea or pain. No fevers  Previous mid chest pain has resolved.      September 2023 previously   She denies acute concerns. She does c/o ongoing chest pain with palpation; has been ongoing for several months, being treated for costochondritis and planning accupuncture soon.   No dyspnea, cough, wheeze. No f/c/s     March 2023 previously  She had previously been evaluated for possible myeloma and had a CT chest showing lung nodules. She was first evaluated at [facility] with Dr. Vera then at [place] with Dr. Fitzgerald and obtained a follow up CT chest. The repeat CT chest demonstrated concern for possible ILD and bronchiectasis leading to her evaluation here. She denies new concerns today. No cough, dyspnea. No fevers. No weight changes.   Does have a hx of raynauds and previously seen by Dr. Abbott with abnormal YOLANDA but repeat YOLANDA was unrevealing so did not follow up.   Exercises daily  Retired, worked as , no exposures to dust, chemicals, fumes, asbestos or TB  Dog at home. No other pets    Past Medical History:   Past Medical History[1]     Past Surgical History: Past Surgical History[2]      Family Medical History: Family History[3]     Social History:   Social History     Socioeconomic History    Marital status:      Spouse name: Not  on file    Number of children: Not on file    Years of education: Not on file    Highest education level: Not on file   Occupational History    Not on file   Tobacco Use    Smoking status: Never    Smokeless tobacco: Never   Vaping Use    Vaping status: Never Used   Substance and Sexual Activity    Alcohol use: No    Drug use: Never    Sexual activity: Never     Partners: Male   Other Topics Concern     Service Not Asked    Blood Transfusions Not Asked    Caffeine Concern Yes     Comment: occas green tea, occas chocolate, occas soda     Occupational Exposure Not Asked    Hobby Hazards Not Asked    Sleep Concern Not Asked    Stress Concern Not Asked    Weight Concern Not Asked    Special Diet Not Asked    Back Care Not Asked    Exercise Yes     Comment: treadmill daily    Bike Helmet Not Asked    Seat Belt Yes    Self-Exams Not Asked   Social History Narrative    Not on file     Social Drivers of Health     Food Insecurity: Not on file   Transportation Needs: Not on file   Stress: Not on file   Housing Stability: Not on file        Medications: Current Medications[4]    Review of Systems: Review of Systems   Constitutional: Negative.    HENT: Negative.     Respiratory: Negative.     Cardiovascular: Negative.    Gastrointestinal: Negative.         Physical Exam:  There were no vitals taken for this visit.     Constitutional: alert, cooperative. No acute distress.  HEENT: Head NC/AT.   Respiratory: Thorax symmetrical with no labored breathing.   Neurologic: A&Ox3. Psych: Calm, cooperative. Pleasant affect.    Results:  Personally reviewed    WBC: 6.5, done on 9/22/2024.  HGB: 13.4, done on 9/22/2024.  PLT: 221, done on 9/22/2024.     Glucose: 81, done on 12/23/2024.  Cr: 0.84, done on 12/23/2024.  Last eGFR was 75 on 12/23/2024.  CA: 9.6, done on 12/23/2024.  Na: 138, done on 12/23/2024.  K: 4.6, done on 12/23/2024.  Cl: 102, done on 12/23/2024.  CO2: 28, done on 12/23/2024.  Last ALB was 4.2% done on  12/23/2024.     No results found.     Assessment/Plan:  #1. ILD  Findings noted incidentally on CT chest in 2018  9/2021 CT chest with minimal peripheral reticular opacities mainly in BUL. +bronchiectasis  8/2022 CT with no change  8/2023 CT chest with no change   8/2023 PFTs with stable FVC, TLC and DLCO; however FEV1 down 500ml (1.9 to 1.4L); she denies any respiratory symptoms but does have costochodritis which is the likely culprit for her reduced FEV1 but other measures being stable  4/2024 PFTs with slight decrease in FVC, TLC and DLCO - also with new machine so changes may be related to different calibration  Negative previous autoimmune eval  5/2024 HRCT no changes from previous imaging, recommend PFT in March 2025 4/2025 PFT better, FVC 2.12L, FEV1 1.65L, FEV1/FVC 78%, TLC 3.72L, DLCO 80%. Denies any breathing complaints.   Reviewed results in detail with the patient, all questions answered  Recommend annual PFT to monitor lung function, patient is agreeable, next PFT March 2026     #2. bronchiectasis  As above  Asymptomatic, advised to call if develops symptoms to start airway clearance/ flutter valve    Marybeth Stewart [facility]  Pulmonary Medicine   4/9/2025    This visit is conducted using Telemedicine with live, interactive video and audio.    Patient has been referred to the [facility] website at [email or web address] to review the yearly Consent to Treat document.    Patient understands and accepts financial responsibility for any deductible, co-insurance and/or co-pays associated with this service.        [1]   Past Medical History:   Amenorrhea    Anemia    Anxiety    Arthritis    Back pain    I currently am experiencing upper back pain.  In 2018 I was told I have spinal stenosis    Belching    Occasionally    Bloating    Occasionally    Blood in urine    Broken leg    Constipation    Currently take Benefiber daily    Diarrhea, unspecified    Occasionally    Diverticulosis    Dysmenorrhea    Dyspareunia     Flatulence/gas pain/belching    I don't think I have an abnormal amount    Frequent urination    Depends on the day and what I drink    Frozen shoulder    H/O bone scan    osteopenia    H1N1 influenza    Headache disorder    Rare    Heartburn    Infrequent    High cholesterol    Started taking statin drug possibly 3 years ago    History of pneumonia as a child    Hyperlipidemia    Irregular bowel habits    More regular in the last 2 years    Lichen sclerosus    Osteopenia    Pain in joints    Not sure, but if I've sat in a car for a long time I feel very stiff/sore until I walk around    Painful swallowing    Eating raw broccoli.  Don't eat it now so no painful swallowing    Pap smear for cervical cancer screening    wnl neg hpv     Pneumonia due to organism    Problems with swallowing    I have trouble swallowing large vitamins/pills    Raynaud's disease    Sleep disturbance    Currently pain disturbs my sleep    Stress    I would term it mild    Urinary incontinence    Vestibulitis, vulvar    Vulvodynia    Wears glasses    Needed a prescription for distance   [2]   Past Surgical History:  Procedure Laterality Date    Biopsy of breast, needle core  2002    Colonoscopy   &     Colposcopy, cervix w/upper adjacent vagina; w/biopsy(s), cervix   &     Cryocautery of cervix   &     Kirill biopsy stereo nodule 1 site left (cpt=19081)      Kirill biopsy stereo nodule 2 site bilat (cpt=19081/62787)       benign    Nail removal  ,       1984, ,     Other surgical history      cryosurgeries x 2    Other surgical history      stereotactic biopsy   [3]   Family History  Problem Relation Age of Onset    Heart Disorder Father         cardiac stent    Hypertension Father     Lipids Father     Other (Other) Father         nephrolithiasis    Dementia Father     Hypertension Mother     Other (Other) Mother         dementia    Dementia Mother     Diabetes Maternal  Grandmother         No    Heart Disorder Maternal Grandmother         No    Hypertension Maternal Grandmother         No    Other (Other) Maternal Grandmother         leukemia    Diabetes Maternal Grandfather     Heart Disorder Maternal Grandfather     Hypertension Maternal Grandfather     Stroke Maternal Grandfather     Heart Disorder Maternal Grandfather         No    Hypertension Maternal Grandfather         Not known    Diabetes Paternal Grandmother     Other (Other) Paternal Grandmother         dementia, glaucoma    Diabetes Paternal Grandfather         No    Heart Disorder Paternal Grandfather         MI?    Hypertension Sister     Other (Other) Brother         parkinsons    Asthma Brother     Depression Brother     Colon Cancer Maternal Aunt         Probably got it in her 60's, but was cured    Stroke Maternal Aunt         She survived it and passed away at age 90    Stroke Maternal Aunt     Breast Cancer Maternal Cousin Female         her  50's    Breast Cancer Paternal Cousin Female         her40's    Hypertension Maternal Cousin     Other (Other) Other         leukemia   [4]   Current Outpatient Medications   Medication Sig Dispense Refill    ATORVASTATIN 10 MG Oral Tab TAKE ONE TABLET BY MOUTH EVERY NIGHT. 90 tablet 1    propranolol 10 MG Oral Tab Take 1 tablet (10 mg total) by mouth daily. 90 tablet 1    acidophilus-pectin Oral Cap Take 1 capsule by mouth daily. Florjen      Multiple Vitamins-Minerals (MULTI COMPLETE/IRON) Oral Tab Take by mouth.      Wheat Dextrin (BENEFIBER OR) Take by mouth. Once daily      Flaxseed, Linseed, (GROUND FLAX SEEDS OR) Take by mouth. Every other day.      Cholecalciferol (VITAMIN D3) 1000 units Oral Cap

## 2025-04-14 ENCOUNTER — HOSPITAL ENCOUNTER (OUTPATIENT)
Dept: BONE DENSITY | Age: 70
Discharge: HOME | End: 2025-04-14
Attending: FAMILY MEDICINE
Payer: MEDICARE

## 2025-04-14 DIAGNOSIS — Z78.0 MENOPAUSE: ICD-10-CM

## 2025-04-14 PROCEDURE — 77080 DXA BONE DENSITY AXIAL: CPT | Performed by: FAMILY MEDICINE

## 2025-04-14 NOTE — PROCEDURES
Patient is a 69-year-old female being assessed with a diagnosis of interstitial lung disease.    Results    FEV1 1.65 L within the normal range at -1.24  FVC 2.12 L normal at -1.27  Ratio was normal at 78%  MVV is preserved at 83% of predicted  Flow-volume loop without demonstrable abnormality    Total lung capacity 3.72 L mildly reduced at -2.03  Residual volume 1.56 L normal at -0.78  Diffusion capacity is normal at -1.26.  When corrected for alveolar volume it further improves to 0.5, 108% of predicted.    Impression --no evidence of airways obstruction.  There is a mild restrictive defect with reduced total lung capacity.  Diffusion capacity remains preserved.      When compared to a study from 3/20/2024, total lung capacity has decreased minimally- 180 cc and residual volume has decreased but similar amount.-There has been an improvement in diffusion capacity(had been 72% of predicted corrects to 98% of predicted).  Total lung capacity had been 4.4 L 89% of predicted on 9/26/2022.

## 2025-04-15 NOTE — PROGRESS NOTES
Dear Mary Robledo,    Your bone density/dexa shows osteopenia.  Focus on weight bearing exercises, adequate vitamin D and calcium intake. I recommend repeating your dexa in 2 years.    Sincerely,  Dr. Logan

## 2025-05-28 ENCOUNTER — LAB ENCOUNTER (OUTPATIENT)
Dept: LAB | Age: 70
End: 2025-05-28
Attending: FAMILY MEDICINE
Payer: MEDICARE

## 2025-05-28 DIAGNOSIS — Z00.00 LABORATORY EXAMINATION ORDERED AS PART OF A ROUTINE GENERAL MEDICAL EXAMINATION: ICD-10-CM

## 2025-05-28 DIAGNOSIS — E78.00 PURE HYPERCHOLESTEROLEMIA: ICD-10-CM

## 2025-05-28 DIAGNOSIS — E55.9 VITAMIN D DEFICIENCY: ICD-10-CM

## 2025-05-28 DIAGNOSIS — Z13.89 SCREENING FOR GENITOURINARY CONDITION: ICD-10-CM

## 2025-05-28 DIAGNOSIS — I10 PRIMARY HYPERTENSION: ICD-10-CM

## 2025-05-28 LAB
ALBUMIN SERPL-MCNC: 4.6 G/DL (ref 3.2–4.8)
ALBUMIN/GLOB SERPL: 1.6 {RATIO} (ref 1–2)
ALP LIVER SERPL-CCNC: 77 U/L (ref 55–142)
ALT SERPL-CCNC: 18 U/L (ref 10–49)
ANION GAP SERPL CALC-SCNC: 8 MMOL/L (ref 0–18)
AST SERPL-CCNC: 40 U/L (ref ?–34)
BASOPHILS # BLD AUTO: 0.06 X10(3) UL (ref 0–0.2)
BASOPHILS NFR BLD AUTO: 1.1 %
BILIRUB SERPL-MCNC: 0.6 MG/DL (ref 0.2–1.1)
BILIRUB UR QL STRIP.AUTO: NEGATIVE
BUN BLD-MCNC: 19 MG/DL (ref 9–23)
CALCIUM BLD-MCNC: 9.9 MG/DL (ref 8.7–10.6)
CHLORIDE SERPL-SCNC: 101 MMOL/L (ref 98–112)
CHOLEST SERPL-MCNC: 172 MG/DL (ref ?–200)
CLARITY UR REFRACT.AUTO: CLEAR
CO2 SERPL-SCNC: 26 MMOL/L (ref 21–32)
CREAT BLD-MCNC: 0.9 MG/DL (ref 0.55–1.02)
EGFRCR SERPLBLD CKD-EPI 2021: 69 ML/MIN/1.73M2 (ref 60–?)
EOSINOPHIL # BLD AUTO: 0.59 X10(3) UL (ref 0–0.7)
EOSINOPHIL NFR BLD AUTO: 11.1 %
ERYTHROCYTE [DISTWIDTH] IN BLOOD BY AUTOMATED COUNT: 12.9 %
FASTING PATIENT LIPID ANSWER: YES
FASTING STATUS PATIENT QL REPORTED: YES
GLOBULIN PLAS-MCNC: 2.9 G/DL (ref 2–3.5)
GLUCOSE BLD-MCNC: 89 MG/DL (ref 70–99)
GLUCOSE UR STRIP.AUTO-MCNC: NORMAL MG/DL
HCT VFR BLD AUTO: 39.7 % (ref 35–48)
HDLC SERPL-MCNC: 79 MG/DL (ref 40–59)
HGB BLD-MCNC: 13.4 G/DL (ref 12–16)
IMM GRANULOCYTES # BLD AUTO: 0.01 X10(3) UL (ref 0–1)
IMM GRANULOCYTES NFR BLD: 0.2 %
KETONES UR STRIP.AUTO-MCNC: NEGATIVE MG/DL
LDLC SERPL CALC-MCNC: 82 MG/DL (ref ?–100)
LEUKOCYTE ESTERASE UR QL STRIP.AUTO: 25
LYMPHOCYTES # BLD AUTO: 1.92 X10(3) UL (ref 1–4)
LYMPHOCYTES NFR BLD AUTO: 36.2 %
MCH RBC QN AUTO: 33.4 PG (ref 26–34)
MCHC RBC AUTO-ENTMCNC: 33.8 G/DL (ref 31–37)
MCV RBC AUTO: 99 FL (ref 80–100)
MONOCYTES # BLD AUTO: 0.54 X10(3) UL (ref 0.1–1)
MONOCYTES NFR BLD AUTO: 10.2 %
NEUTROPHILS # BLD AUTO: 2.18 X10 (3) UL (ref 1.5–7.7)
NEUTROPHILS # BLD AUTO: 2.18 X10(3) UL (ref 1.5–7.7)
NEUTROPHILS NFR BLD AUTO: 41.2 %
NITRITE UR QL STRIP.AUTO: NEGATIVE
NONHDLC SERPL-MCNC: 93 MG/DL (ref ?–130)
OSMOLALITY SERPL CALC.SUM OF ELEC: 282 MOSM/KG (ref 275–295)
PH UR STRIP.AUTO: 5 [PH] (ref 5–8)
PLATELET # BLD AUTO: 227 10(3)UL (ref 150–450)
POTASSIUM SERPL-SCNC: 4.1 MMOL/L (ref 3.5–5.1)
PROT SERPL-MCNC: 7.5 G/DL (ref 5.7–8.2)
PROT UR STRIP.AUTO-MCNC: NEGATIVE MG/DL
RBC # BLD AUTO: 4.01 X10(6)UL (ref 3.8–5.3)
SODIUM SERPL-SCNC: 135 MMOL/L (ref 136–145)
SP GR UR STRIP.AUTO: 1.02 (ref 1–1.03)
TRIGL SERPL-MCNC: 56 MG/DL (ref 30–149)
TSI SER-ACNC: 2.15 UIU/ML (ref 0.55–4.78)
UROBILINOGEN UR STRIP.AUTO-MCNC: NORMAL MG/DL
VIT D+METAB SERPL-MCNC: 64.2 NG/ML (ref 30–100)
VLDLC SERPL CALC-MCNC: 9 MG/DL (ref 0–30)
WBC # BLD AUTO: 5.3 X10(3) UL (ref 4–11)

## 2025-05-28 PROCEDURE — 85025 COMPLETE CBC W/AUTO DIFF WBC: CPT

## 2025-05-28 PROCEDURE — 80061 LIPID PANEL: CPT

## 2025-05-28 PROCEDURE — 80053 COMPREHEN METABOLIC PANEL: CPT

## 2025-05-28 PROCEDURE — 87086 URINE CULTURE/COLONY COUNT: CPT

## 2025-05-28 PROCEDURE — 84443 ASSAY THYROID STIM HORMONE: CPT

## 2025-05-28 PROCEDURE — 81001 URINALYSIS AUTO W/SCOPE: CPT

## 2025-05-28 PROCEDURE — 36415 COLL VENOUS BLD VENIPUNCTURE: CPT

## 2025-05-28 PROCEDURE — 82306 VITAMIN D 25 HYDROXY: CPT

## 2025-06-09 ENCOUNTER — OFFICE VISIT (OUTPATIENT)
Dept: FAMILY MEDICINE CLINIC | Facility: CLINIC | Age: 70
End: 2025-06-09
Payer: COMMERCIAL

## 2025-06-09 VITALS
RESPIRATION RATE: 16 BRPM | DIASTOLIC BLOOD PRESSURE: 72 MMHG | BODY MASS INDEX: 19.65 KG/M2 | HEIGHT: 64 IN | WEIGHT: 115.13 LBS | OXYGEN SATURATION: 100 % | SYSTOLIC BLOOD PRESSURE: 118 MMHG | HEART RATE: 64 BPM

## 2025-06-09 DIAGNOSIS — E55.9 VITAMIN D DEFICIENCY: ICD-10-CM

## 2025-06-09 DIAGNOSIS — Z91.09 ENVIRONMENTAL ALLERGIES: ICD-10-CM

## 2025-06-09 DIAGNOSIS — F41.9 ANXIETY: ICD-10-CM

## 2025-06-09 DIAGNOSIS — I10 PRIMARY HYPERTENSION: ICD-10-CM

## 2025-06-09 DIAGNOSIS — M25.561 CHRONIC PAIN OF RIGHT KNEE: ICD-10-CM

## 2025-06-09 DIAGNOSIS — M43.16 SPONDYLOLISTHESIS OF LUMBAR REGION: ICD-10-CM

## 2025-06-09 DIAGNOSIS — G89.29 CHRONIC PAIN OF RIGHT KNEE: ICD-10-CM

## 2025-06-09 DIAGNOSIS — K21.9 GASTROESOPHAGEAL REFLUX DISEASE, UNSPECIFIED WHETHER ESOPHAGITIS PRESENT: ICD-10-CM

## 2025-06-09 DIAGNOSIS — G47.00 INSOMNIA, UNSPECIFIED TYPE: ICD-10-CM

## 2025-06-09 DIAGNOSIS — R91.8 MULTIPLE NODULES OF LUNG: ICD-10-CM

## 2025-06-09 DIAGNOSIS — M85.88 OSTEOPENIA OF SPINE: ICD-10-CM

## 2025-06-09 DIAGNOSIS — E78.00 PURE HYPERCHOLESTEROLEMIA: ICD-10-CM

## 2025-06-09 DIAGNOSIS — Z71.85 VACCINE COUNSELING: ICD-10-CM

## 2025-06-09 DIAGNOSIS — Z79.899 ON STATIN THERAPY: ICD-10-CM

## 2025-06-09 DIAGNOSIS — Z79.899 MEDICATION MANAGEMENT: ICD-10-CM

## 2025-06-09 DIAGNOSIS — N02.9 BENIGN HEMATURIA: ICD-10-CM

## 2025-06-09 DIAGNOSIS — Z23 NEED FOR VACCINATION: ICD-10-CM

## 2025-06-09 DIAGNOSIS — Z00.00 ENCOUNTER FOR ANNUAL HEALTH EXAMINATION: Primary | ICD-10-CM

## 2025-06-09 DIAGNOSIS — Z13.31 DEPRESSION SCREENING: ICD-10-CM

## 2025-06-09 DIAGNOSIS — J84.9 INTERSTITIAL LUNG DISEASE (HCC): ICD-10-CM

## 2025-06-09 DIAGNOSIS — Z78.0 MENOPAUSE: ICD-10-CM

## 2025-06-09 DIAGNOSIS — Z12.31 SCREENING MAMMOGRAM FOR BREAST CANCER: ICD-10-CM

## 2025-06-09 NOTE — PROGRESS NOTES
The following individual(s) verbally consented to be recorded using ambient AI listening technology and understand that they can each withdraw their consent to this listening technology at any point by asking the clinician to turn off or pause the recording:    Patient name: Mary Mar Meena

## 2025-06-09 NOTE — H&P
Subjective:   Mary Robledo is a 69 year old female who presents for a MA AHA (Medicare Advantage Annual Health Assessment) and Subsequent Annual Wellness visit (Pt already had Initial Annual Wellness) and scheduled follow up of multiple significant but stable problems.     History of Present Illness  Mary Robledo is a 69 year old female with coronary artery disease who presents for a Medicare annual exam and breast exam.    She feels much better after dealing with a debilitating condition for over a year and a half, which has now resolved. She experiences occasional aches and pains but nothing as severe as before.    She received the Prevnar 20 vaccine in 2022 and the Pneumovax 23 in 2020. She is due for a mammogram in August and recently had a bone density test on April 14, 2025, which showed improvement in her back but some loss in her hip. She experiences pain in her left hip, which she stretches daily, and reports that the pain comes and goes.    Recent lab work from May 28, 2025, showed a slightly low sodium level, but other electrolytes were normal. Her liver enzymes are slightly elevated but stable, and her kidney function is normal. Her HDL cholesterol is high, which is beneficial, but her LDL cholesterol is slightly above the target. She has a history of coronary artery disease and is currently on 10 mg of cholesterol medication.    She has a history of benign hematuria, which was previously evaluated by a urologist and deemed non-concerning. Her blood pressure and weight are stable, and her BMI is within normal range.    She has a history of arthritis diagnosed in 2016, which has recently started bothering her, particularly in the right knee since April 1, 2025. She experiences pain when turning and is considering physical therapy for her knees.    She has a new grandchild born on Mother's Day and has been helping with her two and a half year old grandchild. She received a Tdap vaccine on  January 3, 2025, and a COVID-19 vaccine on May 2, 2025.    Last eye exam -- 10/2024  Last dental exam -- 1/2025    History/Other:   Fall Risk Assessment:   She has been screened for Falls and is low risk.      Cognitive Assessment:   She had a completely normal cognitive assessment - see flowsheet entries       Functional Ability/Status:   Mary Robledo has some abnormal functions as listed below:  She has Vision problems based on screening of functional status.       Depression Screening (PHQ):  PHQ-2 SCORE: 0  , done 6/9/2025   If you checked off any problems, how difficult have these problems made it for you to do your work, take care of things at home, or get along with other people?: Not difficult at all    Last [place] Suicide Screening on 6/9/2025 was No Risk.     5 minutes spent screening and counseling for depression    Advanced Directives:   She has a Living Will on file in [employer]; reviewed and discussed documents with patient (and family/surrogate if present).  She does have a POA but we do NOT have it on file in [employer].    Patient has Advance Care Planning documents present in EMR. Reviewed documents with patient (and family/surrogate if present).      Problem List[1]  Allergies:  She is allergic to sulfa drugs cross reactors.    Current Medications:  Active Meds, Sig Only[2]    Medical History:  She  has a past medical history of Amenorrhea (2004), Anemia, Anxiety, Arthritis, Back pain (1999), Belching (2014 (guess)), Bloating (?), Blood in urine (Age 39?), Broken leg (1999), Constipation (1960 (guess)), Diarrhea, unspecified (1960 (guess)), Diverticulosis, Dysmenorrhea (Until menopause 2004), Dyspareunia (Menopause 2004), Flatulence/gas pain/belching (1980 (guess)), Frequent urination (Age 49?), Frozen shoulder, H/O bone scan (05/12/2014), H1N1 influenza (2009), Headache disorder (1967 (guess)), Heartburn (1994 (guess)), High cholesterol (Age 39?), History of pneumonia as a child, Hyperlipidemia,  Irregular bowel habits ( (guess)), Lichen sclerosus, Osteopenia, Pain in joints ( (guess)), Painful swallowing (), Pap smear for cervical cancer screening (2010,2011, 2012,2014,6-3-2015), Pneumonia due to organism (Age 10), Problems with swallowing ( (guess)), Raynaud's disease, Sleep disturbance ( (after first child)), Stress ( (guess)), Urinary incontinence, Vestibulitis, vulvar (), Vulvodynia (), and Wears glasses ().  Surgical History:  She  has a past surgical history that includes biopsy of breast, needle core (2002); madelaine biopsy stereo nodule 2 site bilat (cpt=19081/57627); colonoscopy ( & ); cryocautery of cervix ( & ); colposcopy, cervix w/upper adjacent vagina; w/biopsy(s), cervix ( & );  (, , ); other surgical history (); other surgical history (); madelaine biopsy stereo nodule 1 site left (cpt=19081); and Nail Care (, ).   Family History:  Her family history includes Asthma in her brother; Breast Cancer in her maternal cousin female and paternal cousin female; Colon Cancer in her maternal aunt; Dementia in her father and mother; Depression in her brother; Diabetes in her maternal grandfather, maternal grandmother, paternal grandfather, and paternal grandmother; Heart Disorder in her father, maternal grandfather, maternal grandfather, maternal grandmother, and paternal grandfather; Hypertension in her father, maternal cousin, maternal grandfather, maternal grandfather, maternal grandmother, mother, and sister; Lipids in her father; Other in her brother, father, maternal grandmother, mother, paternal grandmother, and another family member; Stroke in her maternal aunt, maternal aunt, and maternal grandfather.  Social History:  She  reports that she has never smoked. She has never used smokeless tobacco. She reports that she does not drink alcohol and does not use drugs.    Tobacco:  She has never  smoked tobacco.    CAGE Alcohol Screen:   CAGE screening score of 0 on 6/2/2025, showing low risk of alcohol abuse.      Patient Care Team:  Kathryn Logan DO as PCP - General (Family Practice)  Staci Silverio MD (OBSTETRICS & GYNECOLOGY)  Gita Sherwood, PT as Physical Therapist  Eduarda Angel PT as Physical Therapist (Physical Therapy)  Rc Baker MD (GASTROENTEROLOGY)    Review of Systems  GENERAL: feels well otherwise  SKIN: denies any unusual skin lesions  EYES: denies blurred vision or double vision  HEENT: denies nasal congestion, sinus pain or ST  LUNGS: denies shortness of breath with exertion  CARDIOVASCULAR: denies chest pain on exertion  GI: denies abdominal pain, denies heartburn  : denies dysuria, vaginal discharge or itching, no complaint of urinary incontinence   MUSCULOSKELETAL: denies back pain  NEURO: denies headaches  PSYCHE: denies depression or anxiety  HEMATOLOGIC: denies hx of anemia  ENDOCRINE: denies thyroid history  ALL/ASTHMA: denies hx of allergy or asthma    Objective:   Physical Exam  General Appearance:  Alert, cooperative, no distress, appears stated age   Head:  Normocephalic, without obvious abnormality, atraumatic   Eyes:  Conjunctiva/corneas clear, EOM's intact both eyes   Ears:  Normal TM's and external ear canals, both ears   Nose: Nares normal, septum midline,mucosa normal, no drainage or sinus tenderness   Throat: Lips, mucosa, and tongue normal; teeth and gums normal   Neck: Supple, symmetrical, trachea midline, no adenopathy;  thyroid: not enlarged, symmetric, no tenderness/mass/nodules; no carotid bruit or JVD   Back:   Symmetric, no curvature, ROM normal, no CVA tenderness   Lungs:   Clear to auscultation bilaterally, respirations unlabored   Heart:  Regular rate and rhythm, S1 and S2 normal, no murmur, rub, or gallop   Abdomen:   Soft, non-tender, bowel sounds active all four quadrants,  no masses, no organomegaly   Pelvic: DEFERRED   Extremities:  Extremities normal, atraumatic, no cyanosis or edema   Pulses: 2+ and symmetric   Skin: Skin color, texture, turgor normal, no rashes or lesions   Lymph nodes: Cervical, supraclavicular, and axillary nodes normal   Neurologic: Normal   , Breasts:  normal appearance, no masses or tenderness,      /72 (BP Location: Left arm, Patient Position: Sitting, Cuff Size: adult)   Pulse 64   Resp 16   Ht 5' 4\" (1.626 m)   Wt 115 lb 2 oz (52.2 kg)   SpO2 100%   BMI 19.76 kg/m²  Estimated body mass index is 19.76 kg/m² as calculated from the following:    Height as of this encounter: 5' 4\" (1.626 m).    Weight as of this encounter: 115 lb 2 oz (52.2 kg).    Medicare Hearing Assessment:   Hearing Screening    Time taken: 6/9/2025  8:40 AM  Entry User: Bel English CMA  Screening Method: Finger Rub  Finger Rub Result: Pass         Visual Acuity:   Right Eye Visual Acuity: Corrected Right Eye Chart Acuity: 20/30   Left Eye Visual Acuity: Corrected Left Eye Chart Acuity: 20/30   Both Eyes Visual Acuity: Corrected Both Eyes Chart Acuity: 20/30   Able To Tolerate Visual Acuity: Yes        Assessment & Plan:   Mary Robledo is a 69 year old female who presents for a Medicare Assessment.     1. Encounter for annual health examination (Primary)  2. Depression screening  -     Depression Screening (Medicare, Annual) []  3. Pure hypercholesterolemia  -     Cancel: Comp Metabolic Panel (14); Future; Expected date: 11/20/2025  -     Cancel: Lipid Panel; Future; Expected date: 11/20/2025  -     Detailed, Mod Complex (90901)  -     Comp Metabolic Panel (14); Future; Expected date: 09/01/2025  -     Lipid Panel; Future; Expected date: 09/01/2025  4. On statin therapy  -     Cancel: Comp Metabolic Panel (14); Future; Expected date: 11/20/2025  -     Detailed, Mod Complex (22124)  -     Comp Metabolic Panel (14); Future; Expected date: 09/01/2025  5. Interstitial lung disease (HCC)  -     Detailed, Mod Complex (20665)  6.  Primary hypertension  -     Detailed, Mod Complex (56130)  7. Anxiety  -     Detailed, Mod Complex (95987)  8. Gastroesophageal reflux disease, unspecified whether esophagitis present  -     Detailed, Mod Complex (51190)  9. Multiple nodules of lung  -     Detailed, Mod Complex (70112)  10. Osteopenia of spine  -     Detailed, Mod Complex (56238)  11. Spondylolisthesis of lumbar region  Overview:  L4-L5  Orders:  -     Detailed, Mod Complex (84108)  12. Vitamin D deficiency  -     Detailed, Mod Complex (36634)  13. Environmental allergies  -     Detailed, Mod Complex (01479)  14. Insomnia, unspecified type  -     Detailed, Mod Complex (52097)  15. Menopause  -     Detailed, Mod Complex (15186)  16. Medication management  -     Detailed, Mod Complex (20746)  17. Screening mammogram for breast cancer  -     [facility] 2D+3D SCREENING BILAT (CPT=77067/78845); Future; Expected date: 08/13/2025  -     Detailed, Mod Complex (07606)  18. Vaccine counseling  -     Detailed, Mod Complex (53168)  19. Need for vaccination  -     Cancel: Prevnar 20 (PCV20) [91766]  -     Detailed, Mod Complex (16880)  20. Chronic pain of right knee  -     Detailed, Mod Complex (09546)  -     Physical Therapy Referral - [facility]  -     XR KNEE (3 VIEWS), RIGHT (CPT=73562); Future; Expected date: 06/09/2025  21. Benign hematuria  -     Detailed, Mod Complex (09598)    Assessment & Plan  Osteoarthritis of the Knee  Increased right knee pain since April 1st, with mild arthritis diagnosed in 2016. Occasional left knee discomfort.  - Order x-ray of the right knee.  - Refer to physical therapy for both knees.    Hyperlipidemia  LDL cholesterol at 82 mg/dL, above target for coronary artery disease. HDL is beneficially high. Discussed medication adjustment; decided to monitor.  - Re-evaluate cholesterol levels in three months.  - Focus on dietary modifications.    Benign Hematuria  Chronic benign hematuria with negative cystoscopy. Not concerning  if blood in urine remains under two.    General Health Maintenance  Health maintenance up to date. Tdap booster in January 2025, COVID-19 booster in May 2025. Mammogram due August 2025. Bone density improved in back, some hip loss. Eye and dental exams current.  - Schedule mammogram for August 2025.  - Continue routine dental exams, next due in July 2025.  The patient indicates understanding of these issues and agrees to the plan.  Reinforced healthy diet, lifestyle, and exercise.      1. Encounter for annual health examination  Done today.    2. Depression screening  Done today.  - Depression Screening (Medicare, Annual) []    3. Pure hypercholesterolemia  Lipids ordered.  - Omega-3 Fatty Acids (FISH OIL) 300 MG Oral Cap; Take by mouth.  - Detailed, Mod Complex (99214)  - Comp Metabolic Panel (14); Future  - Lipid Panel; Future    4. On statin therapy  On statin.  Goal LDL less than 50.  Patient would like to focus on diet and exercise.  - Detailed, Mod Complex (99214)  - Comp Metabolic Panel (14); Future    5. Interstitial lung disease (HCC)  Co-managed with pulmonary.  Doing well.  - Detailed, Mod Complex (99214)    6. Primary hypertension  Stable on propranolol.  - Detailed, Mod Complex (99214)    7. Anxiety  Stable; CPM  - Detailed, Mod Complex (99214)    8. Gastroesophageal reflux disease, unspecified whether esophagitis present  Stable; CPM  - Detailed, Mod Complex (99214)    9. Multiple nodules of lung  Stable; CPM  - Detailed, Mod Complex (44630)    10. Osteopenia of spine  Stable; CPM  - Detailed, Mod Complex (59531)    11. Spondylolisthesis of lumbar region  Stable; CPM  - Detailed, Mod Complex (32111)    12. Vitamin D deficiency  Stable; CPM  - Detailed, Mod Complex (78548)    13. Environmental allergies  Stable; CPM  - Detailed, Mod Complex (03950)    14. Insomnia, unspecified type  Stable; CPM  - Detailed, Mod Complex (18929)    15. Menopause  Stable; CPM  - Detailed, Mod Complex (54110)    16.  Medication management  Reviewed.  - Detailed, Mod Complex (79350)    17. Screening mammogram for breast cancer  Mammo orered.  - [facility] 2D+3D SCREENING BILAT (CPT=77067/37706); Future  - Detailed, Mod Complex (90101)    18. Vaccine counseling  Reviewed.  - Detailed, Mod Complex (36785)    19. Need for vaccination  Reviewed.  - Detailed, Mod Complex (28583)    20. Chronic pain of right knee  Advised xr and PHYSICAL THERAPY.  - Detailed, Mod Complex (86163)  - Physical Therapy Referral - [facility]  - XR KNEE (3 VIEWS), RIGHT (CPT=73562); Future    21. Benign Hematuria  Stable ;CPM      Return in 3 months (on 9/9/2025) for med check 30.     Kathryn Logan DO, 6/9/2025     Supplementary Documentation:   General Health:  In the past six months, have you lost more than 10 pounds without trying?: (Patient-Rptd) 2 - No  Has your appetite been poor?: (Patient-Rptd) No  Type of Diet: (Patient-Rptd) Balanced  How does the patient maintain a good energy level?: (Patient-Rptd) Appropriate Exercise, Daily Walks  How would you describe your daily physical activity?: (Patient-Rptd) Moderate  How would you describe your current health state?: (Patient-Rptd) Good  How do you maintain positive mental well-being?: (Patient-Rptd) Social Interaction, Puzzles, Visiting Family  On a scale of 0 to 10, with 0 being no pain and 10 being severe pain, what is your pain level?: (Patient-Rptd) 5 - (Moderate)  In the past six months, have you experienced urine leakage?: (Patient-Rptd) 0-No  At any time do you feel concerned for the safety/well-being of yourself and/or your children, in your home or elsewhere?: (Patient-Rptd) No  Have you had any immunizations at another office such as Influenza, Hepatitis B, Tetanus, or Pneumococcal?: (Patient-Rptd) No    Health Maintenance   Topic Date Due    Annual Well Visit  01/01/2025    Annual Depression Screening  01/01/2025    COVID-19 Vaccine (8 - 2024-25 season) 04/04/2025    Mammogram  08/12/2025     Colorectal Cancer Screening  12/19/2030    Influenza Vaccine  Completed    DEXA Scan  Completed    Fall Risk Screening (Annual)  Completed    Pneumococcal Vaccine: 50+ Years  Completed    Zoster Vaccines  Completed    Meningococcal B Vaccine  Aged Out            [1]   Patient Active Problem List  Diagnosis    Hematuria, unspecified    Pure hypercholesterolemia    Insomnia, unspecified    Hypersomnolence    Environmental allergies    Snoring    Dermatophytosis, nail    Irritable colon    Osteopenia of spine    Spondylolisthesis of lumbar region    Radiculitis    Chondromalacia of patella    Osteoarthritis, hand, primary localized, right    Calcified granuloma of lung    Interstitial lung disease (HCC)    Bronchiectasis without acute exacerbation (HCC)    Multiple nodules of lung    ILD (interstitial lung disease) (HCC)   [2]   Outpatient Medications Marked as Taking for the 6/9/25 encounter (Office Visit) with Kathryn Logan, DO   Medication Sig    Omega-3 Fatty Acids (FISH OIL) 300 MG Oral Cap Take by mouth.    ATORVASTATIN 10 MG Oral Tab TAKE ONE TABLET BY MOUTH EVERY NIGHT.    propranolol 10 MG Oral Tab Take 1 tablet (10 mg total) by mouth daily.    acidophilus-pectin Oral Cap Take 1 capsule by mouth daily. Florjen    Multiple Vitamins-Minerals (MULTI COMPLETE/IRON) Oral Tab Take by mouth.    Wheat Dextrin (BENEFIBER OR) Take by mouth. Once daily    Flaxseed, Linseed, (GROUND FLAX SEEDS OR) Take by mouth. Every other day.    Cholecalciferol (VITAMIN D3) 1000 units Oral Cap

## 2025-06-10 ENCOUNTER — HOSPITAL ENCOUNTER (OUTPATIENT)
Dept: GENERAL RADIOLOGY | Age: 70
Discharge: HOME | End: 2025-06-10
Attending: FAMILY MEDICINE
Payer: MEDICARE

## 2025-06-10 DIAGNOSIS — M25.561 CHRONIC PAIN OF RIGHT KNEE: ICD-10-CM

## 2025-06-10 DIAGNOSIS — G89.29 CHRONIC PAIN OF RIGHT KNEE: ICD-10-CM

## 2025-06-10 PROCEDURE — 73562 X-RAY EXAM OF KNEE 3: CPT | Performed by: FAMILY MEDICINE

## 2025-06-11 NOTE — PROGRESS NOTES
Mary,    Your right knee x-ray essentially shows mild arthritis.  Please proceed with physical therapy as we discussed.    Sincerely,  Dr. Logan

## 2025-06-22 DIAGNOSIS — Z79.899 ON STATIN THERAPY: ICD-10-CM

## 2025-06-22 DIAGNOSIS — E78.00 PURE HYPERCHOLESTEROLEMIA: ICD-10-CM

## 2025-06-23 RX ORDER — ATORVASTATIN CALCIUM 10 MG/1
10 TABLET, FILM COATED ORAL NIGHTLY
Qty: 90 TABLET | Refills: 1 | Status: SHIPPED | OUTPATIENT
Start: 2025-06-23

## 2025-06-23 NOTE — TELEPHONE ENCOUNTER
Last office visit: 06/09/2025   Protocol: PASS    Requested medication(s) are due for refill today: Yes    Requested medication(s) are on the active medication list same strength, form, dose/ sig: Yes    Requested medication(s) are managed by provider: Yes    Patient has already received a courtsey refill: No    NOV: 9/8/2025  Asked to Return: 9/9/2025

## 2025-07-10 ENCOUNTER — TELEPHONE (OUTPATIENT)
Dept: PHYSICAL THERAPY | Facility: HOSPITAL | Age: 70
End: 2025-07-10

## 2025-07-10 ENCOUNTER — OFFICE VISIT (OUTPATIENT)
Dept: PHYSICAL THERAPY | Age: 70
End: 2025-07-10
Attending: FAMILY MEDICINE
Payer: MEDICARE

## 2025-07-10 DIAGNOSIS — M25.561 CHRONIC PAIN OF RIGHT KNEE: Primary | ICD-10-CM

## 2025-07-10 DIAGNOSIS — G89.29 CHRONIC PAIN OF RIGHT KNEE: Primary | ICD-10-CM

## 2025-07-10 PROCEDURE — 97161 PT EVAL LOW COMPLEX 20 MIN: CPT

## 2025-07-10 PROCEDURE — 97110 THERAPEUTIC EXERCISES: CPT

## 2025-07-10 NOTE — PROGRESS NOTES
LOWER EXTREMITY EVALUATION:     Diagnosis:   Chronic R knee pain Patient:  Mary Robledo (69 year old, female)        Referring Provider: Kathryn Logan  Today's Date   7/10/2025    Precautions:  None   Date of Evaluation: 07/10/25  Next MD visit: No data recorded  Date of Surgery: n/a     PATIENT SUMMARY     Summary of chief complaints: R ant knee pain  History of current condition: Notes onset of  R knee arthritic pain approximately 2016  . April 2025 R knee started bothering her, with occasional painful catch . Has pain above patella at times at rest. Takes Tylenol as a last resort.   Pain level: current 1 /10, at best 0 /10, at worst 4 /10  Description of symptoms: Ache at ant knee, knee pain when twisting on knee   Occupation: retired   Leisure activities/Hobbies: Treadmill : walking 3 miles / 4 mph   Prior level of function: was able to kneel without pain .  Current limitations: Limited in squatting, normal house work or heavy lifting, trouble going down stairs at times.  Pt goals: Decrease knee pain  Past medical history was reviewed with Mary.  Significant findings include: Lumbar spondylosis, OA, osteopenia  Imaging/Tests: X rat : mild knee OA   Mary  has a past medical history of Amenorrhea (2004), Anemia, Anxiety, Arthritis, Back pain (1999), Belching (2014 (guess)), Bloating (?), Blood in urine (Age 39?), Broken leg (1999), Constipation (1960 (guess)), Diarrhea, unspecified (1960 (guess)), Diverticulosis, Dysmenorrhea (Until menopause 2004), Dyspareunia (Menopause 2004), Flatulence/gas pain/belching (1980 (guess)), Frequent urination (Age 49?), Frozen shoulder, H/O bone scan (05/12/2014), H1N1 influenza (2009), Headache disorder (1967 (guess)), Heartburn (1994 (guess)), High cholesterol (Age 39?), History of pneumonia as a child, Hyperlipidemia, Irregular bowel habits (1960 (guess)), Lichen sclerosus, Osteopenia, Pain in joints (1974 (guess)), Painful swallowing (2022), Pap smear for cervical  cancer screening (2010,2011, 2012,2014,6-3-2015), Pneumonia due to organism (Age 10), Problems with swallowing ( (guess)), Raynaud's disease, Sleep disturbance ( (after first child)), Stress ( (guess)), Urinary incontinence, Vestibulitis, vulvar (), Vulvodynia (), and Wears glasses ().  She  has a past surgical history that includes biopsy of breast, needle core (2002); [facility] biopsy stereo nodule 2 site bilat (cpt=19081/19674); colonoscopy ( & ); cryocautery of cervix ( & ); colposcopy, cervix w/upper adjacent vagina; w/biopsy(s), cervix ( & );  (, , ); other surgical history (); other surgical history (); madelaine biopsy stereo nodule 1 site left (cpt=19081); and [facility] (, ).    ASSESSMENT  Mary presents to physical therapy evaluation with primary c/o R ant knee pain. The results of the objective tests and measures show + R anterior knee pain with squats and step downs, + tightness at bilateral quadriceps, + L hamstring tendon insertional pain , decreased strength at bilateral quads  andhip abductors.. Functional deficits include but are not limited to Limited in squatting, normal house work or heavy lifting, trouble going down stairs at times.. Signs and symptoms are consistent with diagnosis of Chronic R knee pain. Pt and PT discussed evaluation findings, pathology, POC and HEP.  Pt voiced understanding and performs HEP correctly without reported pain. Skilled Physical Therapy is medically necessary to address the above impairments and reach functional goals.    OBJECTIVE:      Musculoskeletal  Observation: WDWF  Palpation: TTP L hamstring insertion   Accessory Motion: decreased R medial patellar glide     Edema: none   Special Tests:- Jerman Mike   + R knee pain with ASD 6\" step, + pain with squat     ROM and Strength: (* denotes performed with pain)  Hip   ROM MMT (-/5)    R L R L     Flex (L2) WNL WNL 5 5     Ext       4+ 4+     Abd     4+ 4+     ER     4 4     IR     4+ 4+    ,   Knee   ROM MMT (-/5)    R L R L     Flex 135 135 4+ 4+     Ext (L3) 0 0 4 4+       Flexibility:    LE Flexibility R L     Hip Flexor min restricted min restricted     Hamstrings min restricted min restricted     ITB WNL WNL     Piriformis WNL WNL     Quads mod restricted mod restricted     Gastroc-soleus min restricted min restricted       Neurological:  Sensation: intact     Balance and Functional Mobility:  Gait: pt ambulates on level ground with normal mechanics   Tandem Stance: R back: 20 sec; L back: 20 sec  SLS: R 20 sec,  L 20 sec  Functional Mobility:  5x sit to stand test: 11 sec   TUG:     Stairs:+ R knee pain descending     Today's Treatment and Response:   Pt education was provided on exam findings, treatment diagnosis, treatment plan, expectations, and prognosis.    Today's Treatment       7/10/2025   LE Treatment   Therapeutic Exercise Prone quad stretch with strap 30 sec x3  - SLR R /L x20  QS 10 sec x20   Bilateral heel raises x20      Therapeutic Exercise Minutes 12   Evaluation Minutes 40   Total Time Of Timed Procedures 12   Total Time Of Service-Based Procedures 40   Total Treatment Time 52   HEP SLR x20 R/L  Quad stretch with strap 30 sec x3 R/L  DHR x20   QS x20          Patient was instructed in and issued a HEP for: SLR x20 R/L  Quad stretch with strap 30 sec x3 R/L  DHR x20   QS x20      Charges:  PT EVAL: Low Complexity, eval ,ex  In agreement with evaluation findings and clinical rationale, this evaluation involved LOW COMPLEXITY decision making due to no personal factors/comorbidities, 1-2 body structures involved/activity limitations, and stable symptoms as documented in the evaluation.                                                                                                                PLAN OF CARE:      Goals: (to be met in 8 visits    Not Met Progress Toward Partially Met Met   Pt will improve eccentric  hamstring control bilaterally to decrease L hamstring insertional pain  [] [] [] []   Pt will improve quad strength to 5/5 to ascend 1 flight of stairs reciprocally without UE assist with min to no pain  [] [] [] []   Pt will increase hip and knee strength to 5/5 to be able to get up and down from the floor safely. [] [] [] []   Pt will demonstrate increased hip ER/ABD strength to 5/5 to perform stepping and squatting activities without excessive femoral IR/ADD. [] [] [] []   Pt will be independent and compliant with comprehensive HEP to maintain progress achieved in PT. [] [] [] []           Frequency / Duration: Patient will be seen 2x/week or a total of 8  visits over a 90 day period. Treatment will include: Neuromuscular Re-education; Gait training; Manual Therapy; Therapeutic Exercise    Education or treatment limitation: None   Rehab Potential:  GOOD     LEFS Score  LEFS Score: (Patient-Rptd) 85 % (7/3/2025  9:53 AM)      Patient/Family/Caregiver was advised of these findings, precautions, and treatment options and has agreed to actively participate in planning and for this course of care.    Thank you for your referral. Please co-sign or sign and return this letter via fax as soon as possible to [phone]. If you have any questions, please contact me at Dept: [phone]    Sincerely,  Electronically signed by therapist: Mak Rowell, PT  Physician's certification required: Yes  I certify the need for these services furnished under this plan of treatment and while under my care.    X___________________________________________________ Date____________________    Certification From: 7/10/2025  To: 10/8/2025

## 2025-07-17 ENCOUNTER — OFFICE VISIT (OUTPATIENT)
Dept: PHYSICAL THERAPY | Age: 70
End: 2025-07-17
Attending: FAMILY MEDICINE
Payer: MEDICARE

## 2025-07-17 PROCEDURE — 97110 THERAPEUTIC EXERCISES: CPT

## 2025-07-17 PROCEDURE — 97140 MANUAL THERAPY 1/> REGIONS: CPT

## 2025-07-17 NOTE — PROGRESS NOTES
Patient: Mary Robledo (69 year old, female) Referring Provider:  Insurance:   Diagnosis: Chronic R knee pain [employer]iam [place] [employer]   Date of Surgery: n/a Next MD visit:     Precautions:  None No data recorded Referral Information:    Date of Evaluation: Req: 8, Auth: 8, Exp: 8/14/2025    07/10/25 POC Auth Visits:          Today's Date   7/17/2025    Subjective  R knee hurts if she twists it. L hamstring insertion is still sore       Pain: 3/10     Objective  + R anterior knee pain with squats and step downs, + tightness at bilateral quadriceps, + L hamstring tendon insertional pain , decreased strength at bilateral quads  andhip abductors.              Assessment  Compliant with HEP and tolerating R knee strengthening and flexibility well. Able to tolerate 4\" ASU but not 6\" ASU due to knee pain. Challenged by supine R hip flexor stretch off mat    Goals (to be met in 8 visits)      Not Met Progress Toward Partially Met Met   Pt will improve eccentric hamstring control bilaterally to decrease L hamstring insertional pain  [] [] [] []   Pt will improve quad strength to 5/5 to ascend 1 flight of stairs reciprocally without UE assist with min to no pain  [] [] [] []   Pt will increase hip and knee strength to 5/5 to be able to get up and down from the floor safely. [] [] [] []   Pt will demonstrate increased hip ER/ABD strength to 5/5 to perform stepping and squatting activities without excessive femoral IR/ADD. [] [] [] []   Pt will be independent and compliant with comprehensive HEP to maintain progress achieved in PT. [] [] [] []               Plan  Cont with R LE flexibility ex, strengthening , pain management Has 6 total avni.  Cont 7/24     Treatment Last 4 Visits  Treatment Day: 2       7/10/2025 7/17/2025   LE Treatment   Therapeutic Exercise Prone quad stretch with strap 30 sec x3  - SLR R /L x20  QS 10 sec x20   Bilateral heel raises x20    Nustep warm up   Prone quad stretch  with strap 30 sec x3  - SLR R /L x20  QS 10 sec x20   Bilateral heel raises x20     Manual Therapy  Manual ham string, piriformis and hip flexor stretches 30 sec x3    Therapeutic Exercise Minutes 12    Evaluation Minutes 40    Total Time Of Timed Procedures 12 0   Total Time Of Service-Based Procedures 40 0   Total Treatment Time 52 0   HEP SLR x20 R/L  Quad stretch with strap 30 sec x3 R/L  DHR x20   QS x20           HEP  SLR x20 R/L  Quad stretch with strap 30 sec x3 R/L  DHR x20   QS x20      Charges      M, ex 2

## 2025-07-24 ENCOUNTER — OFFICE VISIT (OUTPATIENT)
Dept: PHYSICAL THERAPY | Age: 70
End: 2025-07-24
Attending: FAMILY MEDICINE
Payer: MEDICARE

## 2025-07-24 PROCEDURE — 97110 THERAPEUTIC EXERCISES: CPT

## 2025-07-24 NOTE — PROGRESS NOTES
Patient: Mary Robledo (69 year old, female) Referring Provider:  Insurance:   Diagnosis: Chronic R knee pain [employer]iam [place] [employer]   Date of Surgery: n/a Next MD visit:  NONI   Precautions:  None No data recorded Referral Information:    Date of Evaluation: Req: 8, Auth: 8, Exp: 8/14/2025    07/10/25 POC Auth Visits:          Today's Date   7/24/2025    Subjective  R knee is good.   Mid back is really strained from carrying her grandson 3 days ago       Pain: 2/10     Objective  + R anterior knee pain with squats and step downs, + tightness at bilateral quadriceps, + L hamstring tendon insertional pain , decreased strength at bilateral quads  andhip abductors.              Assessment  Patient was limited in transfers and exercises by mid back pain . Treatment modified accordingly.    Goals (to be met in 8 visits)      Not Met Progress Toward Partially Met Met   Pt will improve eccentric hamstring control bilaterally to decrease L hamstring insertional pain  [] [] [] []   Pt will improve quad strength to 5/5 to ascend 1 flight of stairs reciprocally without UE assist with min to no pain  [] [] [] []   Pt will increase hip and knee strength to 5/5 to be able to get up and down from the floor safely. [] [] [] []   Pt will demonstrate increased hip ER/ABD strength to 5/5 to perform stepping and squatting activities without excessive femoral IR/ADD. [] [] [] []   Pt will be independent and compliant with comprehensive HEP to maintain progress achieved in PT. [] [] [] []                   Plan  Cont with R LE flexibility ex, strengthening , pain management. Cont 7/29 and 8/1    Treatment Last 4 Visits  Treatment Day: 3       7/10/2025 7/17/2025 7/24/2025   LE Treatment   Therapeutic Exercise Prone quad stretch with strap 30 sec x3  - SLR R /L x20  QS 10 sec x20   Bilateral heel raises x20    Nustep warm up   Prone quad stretch with strap 30 sec x3  - SLR R /L x20  QS 10 sec x20   Bilateral heel  raises x20  Shuttle L4  DLP  x20,    R/L L4 x20  BOSU: lunge R/L  for hip flexor stretches, standing hamstring stretches     ASU 4\" x15  - SLS R with forward reach   - standing partial squats x10      Nustep warm up 7 mins     -partial squats x10   Prone quad stretch with strap 30 sec x3  - SLR R /L x20  QS 10 sec x20   Bilateral heel raises x20    Seated knee extension   x20 , then 2  w/ 2#    Rocker board: A/P  - ASU 6\" pain (R)  - Partial squats x15 pain        Manual Therapy  Manual ham string, piriformis and hip flexor stretches 30 sec x3   - Med patellar glides    Manual hamstring , piriformis and hip flexor stretches 30 sec x3    Therapeutic Exercise Minutes 12  40   Evaluation Minutes 40     Total Time Of Timed Procedures 12 0 40   Total Time Of Service-Based Procedures 40 0 0   Total Treatment Time 52 0 40   HEP SLR x20 R/L  Quad stretch with strap 30 sec x3 R/L  DHR x20   QS x20    SLR x20 R/L  Quad stretch with strap 30 sec x3 R/L  DHR x20   QS x20           HEP  SLR x20 R/L  Quad stretch with strap 30 sec x3 R/L  DHR x20   QS x20       Charges     ex 3

## 2025-07-28 ENCOUNTER — TELEPHONE (OUTPATIENT)
Dept: FAMILY MEDICINE CLINIC | Facility: CLINIC | Age: 70
End: 2025-07-28

## 2025-07-28 ENCOUNTER — OFFICE VISIT (OUTPATIENT)
Dept: FAMILY MEDICINE CLINIC | Facility: CLINIC | Age: 70
End: 2025-07-28
Payer: COMMERCIAL

## 2025-07-28 VITALS
BODY MASS INDEX: 19.65 KG/M2 | OXYGEN SATURATION: 100 % | WEIGHT: 115.13 LBS | HEIGHT: 64 IN | SYSTOLIC BLOOD PRESSURE: 118 MMHG | HEART RATE: 65 BPM | DIASTOLIC BLOOD PRESSURE: 72 MMHG | RESPIRATION RATE: 16 BRPM

## 2025-07-28 DIAGNOSIS — M99.02 SOMATIC DYSFUNCTION OF SPINE, THORACIC: ICD-10-CM

## 2025-07-28 DIAGNOSIS — S39.012A BACK STRAIN, INITIAL ENCOUNTER: ICD-10-CM

## 2025-07-28 DIAGNOSIS — I10 PRIMARY HYPERTENSION: Primary | ICD-10-CM

## 2025-07-28 DIAGNOSIS — Z79.899 MEDICATION MANAGEMENT: ICD-10-CM

## 2025-07-28 DIAGNOSIS — T14.8XXA MUSCLE STRAIN: Primary | ICD-10-CM

## 2025-07-28 DIAGNOSIS — M99.08 SOMATIC DYSFUNCTION OF RIB: ICD-10-CM

## 2025-07-28 DIAGNOSIS — M99.03 SOMATIC DYSFUNCTION OF SPINE, LUMBAR: ICD-10-CM

## 2025-07-28 RX ORDER — PROPRANOLOL HYDROCHLORIDE 10 MG/1
10 TABLET ORAL DAILY
Qty: 90 TABLET | Refills: 1 | Status: SHIPPED | OUTPATIENT
Start: 2025-07-28

## 2025-07-28 RX ORDER — IBUPROFEN 600 MG/1
600 TABLET, FILM COATED ORAL EVERY 8 HOURS PRN
Qty: 30 TABLET | Refills: 0 | Status: SHIPPED | OUTPATIENT
Start: 2025-07-28

## 2025-07-28 RX ORDER — CYCLOBENZAPRINE HCL 10 MG
10 TABLET ORAL 3 TIMES DAILY
Qty: 10 TABLET | Refills: 0 | Status: SHIPPED | OUTPATIENT
Start: 2025-07-28 | End: 2025-08-17

## 2025-07-28 NOTE — TELEPHONE ENCOUNTER
C/o back pain for a week  Dressing toddler grandson, he is 35 lbs   Jumped towards pt, \"playing catch\"  Felt back muscle pulled   Been sore since, radiates to sides, some spasm tamir laying down     Booked for appt with Angélica on Wednesday  Will try Ibuprofen, voltaren cream and lidocaine patch if will help   Also advised can ice and alternate with warm compress  If worsening pain, to go to [facility]  With verbalized understanding   Amanda

## 2025-07-28 NOTE — TELEPHONE ENCOUNTER
Last office visit: 6/9/2025   Protocol: pass  Requested medication(s) are due for refill today: yes  Requested medication(s) are on the active medication list same strength, form, dose/ sig: yes  Requested medication(s) are managed by provider: yes  Patient has already received a courtsey refill: no    NOV: 7/28/2025  Last Labs: 5/28/2025  Asked to Return: 9/9/2025

## 2025-07-28 NOTE — PROGRESS NOTES
Subjective:   Mary Robledo is a 69 year old female who presents for Back Pain (Believes her trigger started last Monday was trying to dress her grandson after a shower and he just went limp in her arms (he is about 35 lbs) States it is in her entire back and into the ribs as well. Has had some spasms as well.//States laying down is worse, getting up and down and bending over to pick things up Is very difficult )         History/Other:   History of Present Illness  Mary Robledo is a 69 year old female who presents with severe back and rib pain following an acute injury.    She experiences severe pain in her back and ribs, which began after lifting her grandson on Monday evening. The pain is described as excruciating, especially when lying down, and is accompanied by muscle spasms in the rib area. The pain radiates from her back up through her ribs.    She has not taken any medication for the pain due to concerns about interactions with her current medication, propranolol. Although ibuprofen has been effective for her in the past for headaches, she has avoided it due to potential interactions. She is currently taking propranolol and atorvastatin.    Her , who is recovering from hip replacement surgery, requires assistance, which has added to her physical strain.    She has been using ice and heat on her back to manage the pain and has been performing stretches and exercises learned from physical therapy for her knee. She has a physical therapy appointment scheduled for the following day.       Chief Complaint Reviewed and Verified  Nursing Notes Reviewed and   Verified  Tobacco Reviewed  Allergies Reviewed  Medications Reviewed    Problem List Reviewed  Medical History Reviewed  Surgical History   Reviewed  OB Status Reviewed  Family History Reviewed         Tobacco:  She has never smoked tobacco.    Current Medications[1]      Review of Systems:  Review of Systems  A comprehensive 10  point review of systems was completed.  Pertinent positives and negatives noted in the the HPI.     Objective:   /72 (BP Location: Left arm, Patient Position: Sitting, Cuff Size: adult)   Pulse 65   Resp 16   Ht 5' 4\" (1.626 m)   Wt 115 lb 2 oz (52.2 kg)   SpO2 100%   BMI 19.76 kg/m²  Estimated body mass index is 19.76 kg/m² as calculated from the following:    Height as of this encounter: 5' 4\" (1.626 m).    Weight as of this encounter: 115 lb 2 oz (52.2 kg).  Physical Exam      GENERAL: well developed, well nourished,in no apparent distress  PSYCHE: normal mood and affect  SKIN: no rashes,no suspicious lesions  LUNGS: clear to auscultation  CARDIO: RRR without murmur  EXTREMITIES: no cyanosis, clubbing or edema  MUSCULOSKELETAL:    Thoracic: HVLA  Rib 7 right  Paraspinals: tight paraspinals right greater than left in thoracic or lumbar region - stretches    Results          Assessment & Plan:   1. Muscle strain (Primary)  -     Ibuprofen; Take 1 tablet (600 mg total) by mouth every 8 (eight) hours as needed for Pain.  Dispense: 30 tablet; Refill: 0  -     Cyclobenzaprine HCl; Take 1 tablet (10 mg total) by mouth 3 (three) times daily for 20 days.  Dispense: 10 tablet; Refill: 0  2. Back strain, initial encounter  -     Ibuprofen; Take 1 tablet (600 mg total) by mouth every 8 (eight) hours as needed for Pain.  Dispense: 30 tablet; Refill: 0  3. Medication management  4. Somatic dysfunction of spine, thoracic  5. Somatic dysfunction of spine, lumbar  6. Somatic dysfunction of rib    Assessment & Plan  Acute Musculoskeletal Strain  Acute strain in back and rib area likely from lifting. Severe pain, spasms, and tightness, worsened by movement and at night. Short-term ibuprofen safe despite propranolol use. Cyclobenzaprine recommended for night muscle relaxation.  - Prescribed ibuprofen 600 mg, one tablet TID with meals for 3-7 days. Monitor for stomach irritation.  - Prescribed cyclobenzaprine at night for  muscle relaxation. Cautioned about drowsiness.  - Advised ice application to reduce inflammation.  - Encouraged continuation of physical therapy exercises if not exacerbating symptoms.  - Performed manual stretching to alleviate muscle tightness.    Propranolol Use  Propranolol may interact with long-term NSAIDs. Short-term ibuprofen use deemed safe for acute injury.  - Monitor for adverse effects from propranolol and short-term ibuprofen use.    General Health Maintenance  Engaged in physical therapy for knee issues. Managing 's post-operative care may contribute to strain.  - Continue physical therapy for knee as tolerated.      Return if symptoms worsen or fail to improve.      Kathryn Logan DO, 7/28/2025, 3:02 PM               [1]   Current Outpatient Medications   Medication Sig Dispense Refill    propranolol 10 MG Oral Tab Take 1 tablet (10 mg total) by mouth daily. 90 tablet 1    ibuprofen 600 MG Oral Tab Take 1 tablet (600 mg total) by mouth every 8 (eight) hours as needed for Pain. 30 tablet 0    cyclobenzaprine 10 MG Oral Tab Take 1 tablet (10 mg total) by mouth 3 (three) times daily for 20 days. 10 tablet 0    ATORVASTATIN 10 MG Oral Tab TAKE ONE TABLET BY MOUTH EVERY NIGHT. 90 tablet 1    Omega-3 Fatty Acids (FISH OIL) 300 MG Oral Cap Take by mouth.      acidophilus-pectin Oral Cap Take 1 capsule by mouth daily. Florjen      Multiple Vitamins-Minerals (MULTI COMPLETE/IRON) Oral Tab Take by mouth.      Wheat Dextrin (BENEFIBER OR) Take by mouth. Once daily      Flaxseed, Linseed, (GROUND FLAX SEEDS OR) Take by mouth. Every other day.      Cholecalciferol (VITAMIN D3) 1000 units Oral Cap

## 2025-07-29 ENCOUNTER — OFFICE VISIT (OUTPATIENT)
Dept: PHYSICAL THERAPY | Age: 70
End: 2025-07-29
Attending: FAMILY MEDICINE
Payer: MEDICARE

## 2025-07-29 PROCEDURE — 97110 THERAPEUTIC EXERCISES: CPT

## 2025-07-29 PROCEDURE — 97140 MANUAL THERAPY 1/> REGIONS: CPT

## 2025-08-01 ENCOUNTER — OFFICE VISIT (OUTPATIENT)
Dept: PHYSICAL THERAPY | Age: 70
End: 2025-08-01
Attending: FAMILY MEDICINE

## 2025-08-01 PROCEDURE — 97110 THERAPEUTIC EXERCISES: CPT

## 2025-08-01 PROCEDURE — 97140 MANUAL THERAPY 1/> REGIONS: CPT

## 2025-08-07 ENCOUNTER — OFFICE VISIT (OUTPATIENT)
Dept: PHYSICAL THERAPY | Age: 70
End: 2025-08-07
Attending: FAMILY MEDICINE

## 2025-08-07 ENCOUNTER — PATIENT MESSAGE (OUTPATIENT)
Dept: FAMILY MEDICINE CLINIC | Facility: CLINIC | Age: 70
End: 2025-08-07

## 2025-08-07 DIAGNOSIS — S39.012A BACK STRAIN, INITIAL ENCOUNTER: Primary | ICD-10-CM

## 2025-08-07 DIAGNOSIS — T14.8XXA MUSCLE STRAIN: ICD-10-CM

## 2025-08-07 PROCEDURE — 97110 THERAPEUTIC EXERCISES: CPT

## 2025-08-13 ENCOUNTER — HOSPITAL ENCOUNTER (OUTPATIENT)
Dept: MAMMOGRAPHY | Age: 70
Discharge: HOME | End: 2025-08-13
Attending: FAMILY MEDICINE

## 2025-08-13 DIAGNOSIS — Z12.31 SCREENING MAMMOGRAM FOR BREAST CANCER: ICD-10-CM

## 2025-08-13 PROCEDURE — 77067 SCR MAMMO BI INCL CAD: CPT | Performed by: FAMILY MEDICINE

## 2025-08-13 PROCEDURE — 77063 BREAST TOMOSYNTHESIS BI: CPT | Performed by: FAMILY MEDICINE

## 2025-08-27 ENCOUNTER — OFFICE VISIT (OUTPATIENT)
Dept: PHYSICAL THERAPY | Age: 70
End: 2025-08-27
Attending: FAMILY MEDICINE

## 2025-08-27 DIAGNOSIS — S39.012A BACK STRAIN, INITIAL ENCOUNTER: Primary | ICD-10-CM

## 2025-08-27 DIAGNOSIS — T14.8XXA MUSCLE STRAIN: ICD-10-CM

## 2025-08-27 PROCEDURE — 97140 MANUAL THERAPY 1/> REGIONS: CPT

## 2025-08-27 PROCEDURE — 97161 PT EVAL LOW COMPLEX 20 MIN: CPT

## 2025-08-27 PROCEDURE — 97110 THERAPEUTIC EXERCISES: CPT

## (undated) DIAGNOSIS — Z79.899 ON STATIN THERAPY: ICD-10-CM

## (undated) DIAGNOSIS — E78.00 PURE HYPERCHOLESTEROLEMIA: ICD-10-CM

## (undated) NOTE — MR AVS SNAPSHOT
After Visit Summary   6/7/2024    Mary Robledo   MRN: [id number]           Visit Information     Date & Time  6/7/2024 11:30 AM Provider  Kathryn Logan DO [facility] Dept. Phone  [phone]      Your Vitals Were  Most recent update: 6/7/2024 11:35 AM    BP   112/68 (BP Location: Right arm, Patient Position: Sitting, Cuff Size: adult)          Pulse   65          Resp   16          Ht   64.25\"          Wt   112 lb 8 oz             SpO2   98%    BMI   19.16 kg/m²         Allergies as of 6/7/2024  Review status set to Review Complete on 6/7/2024       Noted Reaction Type Reactions    Sulfa Drugs Cross Reactors 04/11/2011        Many years ago, thinks was rash       Your Current Medications        Dosage    acidophilus-pectin Oral Cap Take 1 capsule by mouth daily. Florjen    ATORVASTATIN 10 MG Oral Tab TAKE 1 TABLET(10 MG) BY MOUTH EVERY NIGHT    Multiple Vitamins-Minerals (MULTI COMPLETE/IRON) Oral Tab Take by mouth.    Wheat Dextrin (BENEFIBER OR) Take by mouth. Once daily    Flaxseed, Linseed, (GROUND FLAX SEEDS OR) Take by mouth. Every other day.    clobetasol 0.05 % External Cream Apply 1 Application. topically 2 (two) times daily as needed.    Cholecalciferol (VITAMIN D3) 1000 units Oral Cap     Omega-3 Fatty Acids (FISH OIL) 1000 MG Oral Cap Take 1,000 mg by mouth daily.      Diagnoses for This Visit    Encounter for annual health examination   [801528]  -  Primary  On statin therapy   [5451500]    Vitamin D deficiency   [360875]    Interstitial lung disease   [641373]    Chest wall pain   [608744]    Multiple nodules of lung   [604841]    Gastroesophageal reflux disease, unspecified whether esophagitis present   [3762725]    Environmental allergies   [314299]    Osteopenia of spine   [8745277]    Lichen sclerosus of female genitalia   [528207]    Irritable bowel syndrome with constipation   [679303]    Spondylolisthesis of lumbar region    [352188]    Chondromalacia of right patella   [082160]    Bilateral hearing loss, unspecified hearing loss type   [3878653]    Hearing loss due to cerumen impaction, right   [9947098]    Screening, anemia, deficiency, iron   [973447]    Medication management   [011597]    Screening for malignant neoplasm of cervix   [268737]    Vaccine counseling   [4353968]    Screening mammogram for breast cancer   [0972607]    Screening for genitourinary condition   [036155]    Laboratory examination ordered as part of a routine general medical examination   [V72.62.ICD-9-CM]             Follow-up    Return in 6 months (on 12/7/2024) for med check.     We Ordered the Following     Normal Orders This Visit    CBC W Differential W Platelet [E] [2005009 CUSTOM]     CBC W/ DIFFERENTIAL [99324679 CUSTOM]     Comp Metabolic Panel (14) [E] [2322000 CUSTOM]     Detailed, Mod Complex (66591) [71502 CPT(R)]     Image-Guided Pap Smear ([facility]) [MUN9342 CPT(R)]     Lipid Panel [E] [3250120 CUSTOM]     ThinPrep PAP with HPV Reflex Request [EFS3099 CUSTOM]     TSH W Reflex To Free T4 [E] [1005888 CUSTOM]     Urinalysis, Routine [E] [1515777 CUSTOM]     Vitamin D [E] [88629 CUSTOM]     Vitamin D, 25-Hydroxy [4939800 CUSTOM]     Future Labs/Procedures Expected by Expires    CBC W Differential W Platelet [E] [2005009 CUSTOM]  6/7/2024 6/6/2025    Comp Metabolic Panel (14) [E] [2497163 CUSTOM]  6/7/2024 6/6/2025    Lipid Panel [E] [6336216 CUSTOM]  6/7/2024 6/6/2025    ThinPrep PAP with HPV Reflex Request [OOH0000 CUSTOM]  6/7/2024 6/7/2025    TSH W Reflex To Free T4 [E] [4908157 CUSTOM]  6/7/2024 6/6/2025    Urinalysis, Routine [E] [6017726 CUSTOM]  6/7/2024 6/6/2025    Vitamin D [E] [79014 CUSTOM]  6/7/2024 (Approximate) 6/6/2025    Vitamin D, 25-Hydroxy [6246856 CUSTOM]  6/7/2024 (Approximate) 6/7/2025    [facility] NELLI 2D+3D SCREENING BILAT (CPT=77067/52666) [COMBO CPT(R)]  8/9/2024 (Approximate) 6/6/2025    Comp Metabolic Panel (14) [4533401  CUSTOM]  12/1/2024 (Approximate) 6/7/2025    Lipid Panel [5196773 CUSTOM]  12/1/2024 (Approximate) 6/7/2025      Future Appointments        Provider Department    8/12/2024 10:40 AM [facility] Mammography - [address]      Follow-up Instructions    Return in 6 months (on 12/7/2024) for med check.     Imaging Scheduling Instructions     Around August 9, 2024   Imaging:   [facility] NELLI 2D+3D SCREENING BILAT (CPT=77067/47895)    Instructions: Your order will generate a \"Scheduling Ticket\" that will be available in [employer] to schedule on your own at a time most convenient to you.      If you do not have a [employer] Account, or if you prefer to speak with someone to schedule your appointment, please call [employer] Scheduling at [phone].        Instructions    Mary Robledo's SCREENING SCHEDULE   Tests on this list are recommended by your physician but may not be covered, or covered at this frequency, by your insurer.   Please check with your insurance carrier before scheduling to verify coverage.   PREVENTATIVE SERVICES FREQUENCY &  COVERAGE DETAILS LAST COMPLETION DATE   Diabetes Screening    Fasting Blood Sugar /  Glucose    One screening every 12 months if never tested or if previously tested but not diagnosed with pre-diabetes   One screening every 6 months if diagnosed with pre-diabetes Lab Results   Component Value Date    GLU 91 11/20/2023        Cardiovascular Disease Screening    Lipid Panel  Cholesterol  Lipoprotein (HDL)  Triglycerides Covered every 5 years for all Medicare beneficiaries without apparent signs or symptoms of cardiovascular disease Lab Results   Component Value Date    CHOLEST 167 11/20/2023    HDL 95 (H) 11/20/2023    LDL 60 11/20/2023    TRIG 59 11/20/2023         Electrocardiogram (EKG)   Covered if needed at Welcome to Medicare, and non-screening if indicated for medical reasons -      Ultrasound Screening for Abdominal Aortic Aneurysm (AAA) Covered once in  a lifetime for one of the following risk factors   • Men who are 65-75 years old and have ever smoked   • Anyone with a family history -     Colorectal Cancer Screening  Covered for ages 50-85; only need ONE of the following:    Colonoscopy   Covered every 10 years    Covered every 2 years if patient is at high risk or previous colonoscopy was abnormal 12/19/2023    Health Maintenance   Topic Date Due   • Colorectal Cancer Screening  12/19/2030       Flexible Sigmoidoscopy   Covered every 4 years -    Fecal Occult Blood Test Covered annually -   Bone Density Screening    Bone density screening    Covered every 2 years after age 65 if diagnosed with risk of osteoporosis or estrogen deficiency.    Covered yearly for long-term glucocorticoid medication use (Steroids) Last Dexa Scan:    XR DEXA BONE DENSITOMETRY (CPT=77080) 03/27/2023      No recommendations at this time   Pap and Pelvic    Pap   Covered every 2 years for women at normal risk; Annually if at high risk 05/23/2023  No recommendations at this time    Chlamydia Annually if high risk -  No recommendations at this time   Screening Mammogram    Mammogram     Recommend annually for all female patients aged 40 and older    One baseline mammogram covered for patients aged 35-39 08/09/2023    Health Maintenance   Topic Date Due   • Mammogram  08/09/2024       Immunizations    Influenza Covered once per flu season  Please get every year 10/06/2023  No recommendations at this time    Pneumococcal Each vaccine (Hdgmprd27 & Nrvqcjeqk51) covered once after 65 Prevnar 13: -    Fgzxqmrdq19: 01/14/2020     No recommendations at this time    Hepatitis B One screening covered for patients with certain risk factors   01/09/2019  No recommendations at this time    Tetanus Toxoid Not covered by Medicare Part B unless medically necessary (cut with metal); may be covered with your pharmacy prescription benefits -    Tetanus, Diptheria and Pertusis TD and TDaP Not covered by  Medicare Part B -  No recommendations at this time    Zoster Not covered by Medicare Part B; may be covered with your pharmacy  prescription benefits 06/19/2017  No recommendations at this time                        Recommended Websites for Advance Directives    https://www.isms.org/ISMS.org/media/ISMSMediaLibrary/Resources/AdvanceDirectives/A-Personal-Decision.pdf  An information packet, including necessary form from the [place] [employer] website.    https://dph.[place].gov/topics-services/health-care-regulation/nursing-homes/advance-directives.html  A link to the [facility] of Public Health. This site has a lot of good information including definitions of the different types of Advance Directives. It also has the State forms available on it's website for anyone to review and print using their home computer and printer (forms are also available in Greek).        Did you know that [facility] primary care physicians now offer Video Visits through [employer] for adult patients for a variety of conditions such as allergies, back pain and cold symptoms? Skip the drive and waiting room and online chat with a doctor face-to-face using your web-cam enabled computer or mobile device wherever you are. Video Visits cost $50 and can be paid hassle-free using a credit, debit, or health savings card.  Not active on [employer]? Ask us how to get signed up today!          If you receive a survey from [facility] [employer], please take a few minutes to complete it and provide feedback. We strive to deliver the best patient experience and are looking for ways to make improvements. Your feedback will help us do so. For more information on [employer], please visit [email or web address]           No text in SmartText           No text in SmartText

## (undated) NOTE — MR AVS SNAPSHOT
After Visit Summary   6/14/2021    Aldo Shah    MRN: [id number]           Visit Information     Date & Time  6/14/2021  9:00 AM Provider  Massie Councilman, [facility] [address], [facility] Dept.  Phone  [phone] unspecified hearing loss type   [7123175]    Osteoarthritis of cervical spine, unspecified spinal osteoarthritis complication status   [0081503]    Spinal stenosis of cervical region   [200561]    Plantar fasciitis   [712749]    Raynaud's disease without g this list are recommended by your physician but may not be covered, or covered at this frequency, by your insurer. Please check with your insurance carrier before scheduling to verify coverage.    PREVENTATIVE SERVICES FREQUENCY &  COVERAGE DETAILS LAST C and Pelvic    Pap   Covered every 2 years for women at normal risk;  Annually if at high risk -  No recommendations at this time    Chlamydia Annually if high risk -  No recommendations at this time   Screening Mammogram    Mammogram     Recommend annually offers Video Visits through [address] [facility] for adult and pediatric patients. Video Visits are available Monday - Friday for many common conditions such as allergies, colds, cough, fever, rash, sore throat, headache and pink eye.   The cost for a Video Visit is cu – Friday  4:00 pm – 10:00 pm   Saturday – Sunday  10:00 am – 4:00 pm  WALK-IN CARE  Emergency Medicine Providers  Conditions needing urgent attention, but are   non-life-threatening.     Also available by appointment Average cost  $120*     EMERGENCY ROOM L

## (undated) NOTE — MR AVS SNAPSHOT
[facility] [address] [facility]  [phone]               Thank you for choosing us for your health care visit with Danny Julien MD.  We are glad to serve you and happy to provide you with this summ Assoc Dx: Well adult exam [Z00.00], Screening for thyroid disorder [Z13.29]           Vitamin D, 25-Hydroxy [E]    Complete by:  Jun 19, 2017 (Approximate)    Assoc Dx:   Well adult exam [Z00.00], Encounter for vitamin deficiency screening [Z13.21] Prevention Guidelines, Women Ages 48 to 59  Screening tests and vaccines are an important part of managing your health. Health counseling is essential, too. Below are guidelines for these, for women ages 48 to 59.  Talk with your healthcare provider to make Lung cancer Adults age 54 to [de-identified] who have smoked Yearly screening in smokers with 30 pack-year history of smoking or who quit within 15 years   Obesity All women in this age group At routine exams   Osteoporosis Women who are postmenopausal Ask your healthc against 23 types of pneumococcal bacteria)   Tetanus/diphtheria/pertussis (Td/Tdap) booster All women in this age group Td every 10 years, or a one-time dose of Tdap instead of a Td booster after age 25, then Td every 10 years   Zoster All women ages 61 an Efinaconazole 10 % Soln   Apply 1 Application topically daily. Commonly known as:  JUBLIA           MULTI-VITAMIN DAILY OR   Take 1 Tab by mouth daily. omega-3 fatty acids 1000 MG Caps   Take 1 Cap by mouth daily.    Commonly known as:  FISH OI Visit [facility] online at  [email or web address]

## (undated) NOTE — LETTER
Date: 9/5/2024    Patient Name: Mary Manuel Meena Hernandez,    I was speaking with a PT named Thuy and I think she might be able to help with your chest wall pain.  I placed a referral for her in the system. She is at [place] but really good.  I would try her as well with accupuncture.  If you want to consider medications, please tell me.  Hope this helps!      Sincerely,      Kathryn Logan, DO